# Patient Record
Sex: FEMALE | Race: OTHER | HISPANIC OR LATINO | Employment: FULL TIME | URBAN - METROPOLITAN AREA
[De-identification: names, ages, dates, MRNs, and addresses within clinical notes are randomized per-mention and may not be internally consistent; named-entity substitution may affect disease eponyms.]

---

## 2020-06-05 ENCOUNTER — OFFICE VISIT (OUTPATIENT)
Dept: OBGYN CLINIC | Facility: CLINIC | Age: 35
End: 2020-06-05

## 2020-06-05 VITALS
HEIGHT: 65 IN | SYSTOLIC BLOOD PRESSURE: 119 MMHG | TEMPERATURE: 97.2 F | DIASTOLIC BLOOD PRESSURE: 81 MMHG | BODY MASS INDEX: 31.92 KG/M2 | WEIGHT: 191.6 LBS

## 2020-06-05 DIAGNOSIS — Z01.419 ENCOUNTER FOR ANNUAL ROUTINE GYNECOLOGICAL EXAMINATION: Primary | ICD-10-CM

## 2020-06-05 PROCEDURE — 87624 HPV HI-RISK TYP POOLED RSLT: CPT | Performed by: NURSE PRACTITIONER

## 2020-06-05 PROCEDURE — 88141 CYTOPATH C/V INTERPRET: CPT | Performed by: PATHOLOGY

## 2020-06-05 PROCEDURE — 99385 PREV VISIT NEW AGE 18-39: CPT | Performed by: NURSE PRACTITIONER

## 2020-06-05 PROCEDURE — G0145 SCR C/V CYTO,THINLAYER,RESCR: HCPCS | Performed by: PATHOLOGY

## 2020-06-05 RX ORDER — FERROUS SULFATE 325(65) MG
325 TABLET ORAL
COMMUNITY

## 2020-06-10 LAB
HPV HR 12 DNA CVX QL NAA+PROBE: POSITIVE
HPV16 DNA CVX QL NAA+PROBE: NEGATIVE
HPV18 DNA CVX QL NAA+PROBE: NEGATIVE

## 2020-06-22 ENCOUNTER — TELEPHONE (OUTPATIENT)
Dept: OBGYN CLINIC | Facility: CLINIC | Age: 35
End: 2020-06-22

## 2020-06-22 PROBLEM — R87.620 ATYPICAL SQUAMOUS CELL CHANGES OF UNDETERMINED SIGNIFICANCE (ASCUS) ON VAGINAL CYTOLOGY WITH POSITIVE HIGH RISK HUMAN PAPILLOMA VIRUS (HPV): Status: ACTIVE | Noted: 2020-06-22

## 2020-06-22 PROBLEM — R87.811 ATYPICAL SQUAMOUS CELL CHANGES OF UNDETERMINED SIGNIFICANCE (ASCUS) ON VAGINAL CYTOLOGY WITH POSITIVE HIGH RISK HUMAN PAPILLOMA VIRUS (HPV): Status: ACTIVE | Noted: 2020-06-22

## 2020-06-22 LAB
LAB AP GYN PRIMARY INTERPRETATION: ABNORMAL
Lab: ABNORMAL
PATH INTERP SPEC-IMP: ABNORMAL

## 2020-06-26 ENCOUNTER — TELEPHONE (OUTPATIENT)
Dept: OBGYN CLINIC | Facility: CLINIC | Age: 35
End: 2020-06-26

## 2020-07-21 ENCOUNTER — PROCEDURE VISIT (OUTPATIENT)
Dept: OBGYN CLINIC | Facility: CLINIC | Age: 35
End: 2020-07-21

## 2020-07-21 VITALS
SYSTOLIC BLOOD PRESSURE: 114 MMHG | BODY MASS INDEX: 32.22 KG/M2 | DIASTOLIC BLOOD PRESSURE: 75 MMHG | WEIGHT: 193.4 LBS | HEIGHT: 65 IN | HEART RATE: 69 BPM | TEMPERATURE: 98.2 F

## 2020-07-21 DIAGNOSIS — R87.611 PAP SMEAR OF CERVIX WITH ASCUS, CANNOT EXCLUDE HGSIL: Primary | ICD-10-CM

## 2020-07-21 PROCEDURE — 88305 TISSUE EXAM BY PATHOLOGIST: CPT | Performed by: PATHOLOGY

## 2020-07-21 PROCEDURE — 57454 BX/CURETT OF CERVIX W/SCOPE: CPT | Performed by: OBSTETRICS & GYNECOLOGY

## 2020-07-21 NOTE — PATIENT INSTRUCTIONS
Tubal Ligation   AMBULATORY CARE:   What you need to know about a tubal ligation:  A tubal ligation is surgery to close your fallopian tubes to prevent pregnancy  How to prepare for a tubal ligation:  Your healthcare provider will talk to you about how to prepare for surgery  He may tell you not to eat or drink anything within 8 hours before your surgery  He will tell you what medicines to take or not take on the day of your surgery  Arrange for someone to drive you home and stay with you after surgery  What will happen during a tubal ligation:   · You may be given general anesthesia to keep you asleep and free from pain during surgery  You may instead be given regional anesthesia or local anesthesia  Regional anesthesia numbs the lower part of your body  Local anesthesia numbs the surgery area  With local anesthesia, you may still feel pressure or pushing during surgery, but you should not feel any pain  You may have a minilaparotomy or laparoscopic tubal ligation  During a minilaparotomy, your surgeon will make a small incision in your lower abdomen  · During laparoscopic tubal ligation, your surgeon will make one or more small incisions in your abdomen  A laparoscope and other instruments will be put into your abdomen through the small incisions  The laparoscope is a long metal tube with a light and camera on the end  Your abdomen will be filled with a gas  This allows your surgeon to see inside your abdomen more clearly  Your fallopian tubes will be cut and closed with thread  Your healthcare provider may instead seal your tubes with heat, or with a clip or ring  After the surgery is done, your incisions are closed with stitches or staples  The incisions may be covered with a bandage  What will happen after a tubal ligation:  You will have abdominal pain and cramps for the first few days after surgery  You may feel dizzy, nauseated, bloated, or have gas   You may also have pain in your shoulders or near your ribs if gas was put in your abdomen  Risks of a tubal ligation:  You may bleed more than expected or get an infection  Blood vessels or organs such as your bowel or bladder could be injured during surgery  Although pregnancy is unlikely after a tubal ligation, there is still a small chance that you may get pregnant  If pregnancy does occur, there is an increased risk for an ectopic pregnancy (tubal pregnancy)  You may get a blood clot in your leg or arm  This may become life-threatening  Call 911 for any of the following:   · You cough up blood  · You feel lightheaded, short of breath, and have chest pain  Seek care immediately if:   · Your arm or leg feels warm, tender, and painful  It may look swollen and red  · You have severe abdominal pain  Contact your surgeon or gynecologist if:   · You have a fever  · You have heavy bleeding from your incisions  · Your stitches or staples come apart  · You have trouble urinating, burning when you urinate, or bloody urine  · Your incision is swollen, red, or has pus coming from it  · You have questions or concerns about your condition or care  Medicines: You may need any of the following:  · Prescription pain medicine  may be given  Ask how to take this medicine safely  · Acetaminophen  decreases pain  It is available without a doctor's order  Ask how much to take and how often to take it  Follow directions  Acetaminophen can cause liver damage if not taken correctly  · NSAIDs , such as ibuprofen, help decrease swelling, pain, and fever  NSAIDs can cause stomach bleeding or kidney problems in certain people  If you take blood thinner medicine, always ask your healthcare provider if NSAIDs are safe for you  Always read the medicine label and follow directions  · Take your medicine as directed  Contact your healthcare provider if you think your medicine is not helping or if you have side effects   Tell him or her if you are allergic to any medicine  Keep a list of the medicines, vitamins, and herbs you take  Include the amounts, and when and why you take them  Bring the list or the pill bottles to follow-up visits  Carry your medicine list with you in case of an emergency  Activity:   · Avoid strenuous activities  such as lifting heavy objects and intense exercise for 7 days after your surgery  Ask when it is safe for you to drive, return to work, and return to other regular activities  · Do not strain during bowel movements  High-fiber foods and extra liquids can help you prevent constipation  Examples of high-fiber foods are fruit and bran  Prune juice and water are good liquids to drink  · Do not have sex  for at least 1 week  If your tubal ligation surgery was done after you had a baby, you will need to wait longer  Ask your healthcare provider when you can have sex  · Do not go into a bath or hot tub  for 10 days or as directed  Take a shower instead of taking a bath  Wound care:  Care for your wound as directed  Carefully wash the wound with soap and water  Dry the area and put on new, clean bandages as directed  Change your bandages when they get wet or dirty  Follow up with your healthcare provider within 7 to 10 days or as directed:  Write down your questions so you remember to ask them during your visits  © 2017 [employer] Information is for End User's use only and may not be sold, redistributed or otherwise used for commercial purposes  All illustrations and images included in CareNotes® are the copyrighted property of A D A M [employer] Inc  or Naresh Patrick  The above information is an  only  It is not intended as medical advice for individual conditions or treatments  Talk to your doctor, nurse or pharmacist before following any medical regimen to see if it is safe and effective for you    Colposcopy   WHAT YOU NEED TO KNOW:   A colposcopy is a procedure to look for abnormal cells in your cervix and vagina  Your healthcare provider will use a colposcope, which is a small scope with a light on it  DISCHARGE INSTRUCTIONS:   Medicines:   · Pain medicine: You may be given medicine to take away or decrease pain  Do not wait until the pain is severe before you take your medicine  · Take your medicine as directed  Call your healthcare provider if you think your medicine is not helping or if you have side effects  Tell him if you are allergic to any medicine  Keep a list of the medicines, vitamins, and herbs you take  Include the amounts, and when and why you take them  Bring the list or the pill bottles to follow-up visits  Carry your medicine list with you in case of an emergency  Follow up with your healthcare provider or gynecologist as directed: You may need to return for more tests or to have abnormal cells removed  Write down your questions so you remember to ask them during your visits  Self-care:  Use a sanitary pad for any light bleeding  Ask when it is okay to use tampons, douche, or have sex  If you are pregnant, do not put anything in your vagina until your healthcare provider says it is okay  Avoid heavy lifting for 24 hours after your procedure, this will decrease your risk of bleeding  Contact your healthcare provider or gynecologist if:   · You have pain that does not go away, even after you take pain medicine  · You have a fever  · You have questions or concerns about your condition or care  Seek care immediately or call 911 if:   · You have bleeding from your vagina that is heavier than your monthly period  © 2016 [employer] is for End User's use only and may not be sold, redistributed or otherwise used for commercial purposes  All illustrations and images included in CareNotes® are the copyrighted property of [employer]  or Naresh Patrick  The above information is an  only   It is not intended as medical advice for individual conditions or treatments  Talk to your doctor, nurse or pharmacist before following any medical regimen to see if it is safe and effective for you

## 2020-07-21 NOTE — PROGRESS NOTES
Colposcopy  Date/Time: 7/21/2020 3:43 PM  Performed by: Cam Aguilar MD  Authorized by: Cam Aguilar MD     Consent:     Consent obtained:  Written and verbal    Consent given by:  Patient    Procedural risks discussed:  Bleeding and infection    Patient questions answered: yes      Patient agrees, verbalizes understanding, and wants to proceed: yes      Educational handouts given: yes    Pre-procedure:     Pre-procedure timeout performed: yes      Prepped with: acetic acid and Lugol    Indication:     Indication:  ASC-H  Procedure:     Procedure: Colposcopy w/ cervical biopsy and ECC      Under satisfactory analgesia the patient was prepped and draped in the dorsal lithotomy position: yes      Madison speculum was placed in the vagina: yes      Under colposcopic examination the transition zone was seen in entirety: yes      Endocervix was curetted using a Kevorkian curette: yes      Cervical biopsy performed with a cervical biopsy punch: yes      Monsel's solution was applied: yes      Biopsy(s): yes      Location:  6 o'clock    Specimen to pathology: yes    Post-procedure:     Findings: White epithelium      Impression: Low grade cervical dysplasia      Patient tolerance of procedure:   Tolerated well, no immediate complications

## 2020-07-31 ENCOUNTER — TELEPHONE (OUTPATIENT)
Dept: OBGYN CLINIC | Facility: CLINIC | Age: 35
End: 2020-07-31

## 2020-08-13 NOTE — PROGRESS NOTES
Assessment/Plan:     No problem-specific Assessment & Plan notes found for this encounter  Diagnoses and all orders for this visit:    Encounter to discuss test results    HGSIL on cytologic smear of cervix    RTO for surgical H&P  Pt consented for LEEP, bilateral laparoscopic salpingectomy, and IUD removal            Subjective:      Patient ID: Betzy Yun is a 37 y o  female presents for colposcopy results  The results are below  Final Diagnosis    A  Uterine Cervix, Endocervical Curetting:  - Minute fragments of benign endocervical mucosa      B  Uterine Cervix, Biopsy:  - High-grade squamous intraepithelial lesion (HSIL/MARIANO II)  LEEP of the cervix was recommended, pt agrees  Procedure was described to the patient and she verbalized understanding  Pt has a Paragard IUD in place and would like permanent sterilization  Reviewed with patient surgical sterilization is PERMANENT STERILIZATION and NOT REVERSIBLE  Discussed other temporary contraceptive options (including LARC's)  Reviewed risks & benefits of surgical sterilization  Patient verbally rejects other options and desires surgical sterilization  Sterilization consent (MA 31 form) signed   Details of the procedure were described and the patient verbalized understanding  She understands her IUD can be removed at that time  HPI    The following portions of the patient's history were reviewed and updated as appropriate: allergies, current medications, past family history, past medical history, past social history, past surgical history and problem list     Review of Systems      Objective:      /76   Pulse 83   Temp 98 4 °F (36 9 °C)   Ht 5' 5" (1 651 m)   Wt 86 9 kg (191 lb 9 6 oz)   LMP 07/21/2020 (Approximate)   BMI 31 88 kg/m²          Physical Exam  Vitals signs and nursing note reviewed  Constitutional:       Appearance: Normal appearance     Pulmonary:      Effort: Pulmonary effort is normal  Neurological:      General: No focal deficit present  Mental Status: She is oriented to person, place, and time     Psychiatric:         Mood and Affect: Mood normal          Behavior: Behavior normal

## 2020-08-14 ENCOUNTER — OFFICE VISIT (OUTPATIENT)
Dept: OBGYN CLINIC | Facility: CLINIC | Age: 35
End: 2020-08-14

## 2020-08-14 VITALS
HEART RATE: 83 BPM | HEIGHT: 65 IN | TEMPERATURE: 98.4 F | DIASTOLIC BLOOD PRESSURE: 76 MMHG | BODY MASS INDEX: 31.92 KG/M2 | SYSTOLIC BLOOD PRESSURE: 118 MMHG | WEIGHT: 191.6 LBS

## 2020-08-14 DIAGNOSIS — R87.613 HGSIL ON CYTOLOGIC SMEAR OF CERVIX: ICD-10-CM

## 2020-08-14 DIAGNOSIS — Z71.2 ENCOUNTER TO DISCUSS TEST RESULTS: Primary | ICD-10-CM

## 2020-08-14 PROCEDURE — 99214 OFFICE O/P EST MOD 30 MIN: CPT | Performed by: OBSTETRICS & GYNECOLOGY

## 2020-08-14 NOTE — PATIENT INSTRUCTIONS
Loop Electrosurgical Excision Procedure   AMBULATORY CARE:   A loop electrosurgical excision procedure (LEEP)  A LEEP is a procedure to remove abnormal tissue from your cervix or vagina  The tissue can be tested for cancer or infection  You may need a LEEP if other tests have found abnormal cells on your cervix or in your vagina  How to prepare for a LEEP:  Your healthcare provider will talk to you about how to prepare for your procedure  Do not douche, use tampons, or have sex for 24 hours before the procedure  Do not put medicines in your vagina for 24 hours before the procedure  Call your healthcare provider if you have your menstrual period on the day of the procedure  You may need to wait until your period ends to have the procedure  Arrange for someone to drive you home and stay with you after your procedure  What will happen during a LEEP:   · Your healthcare provider will insert a speculum in your vagina  A speculum is an instrument that holds the vagina open so your provider can see your cervix  You will be given local anesthesia to numb your cervix  With local anesthesia, you may feel pressure or pushing during your procedure, but you should not feel pain  · Your healthcare provider will insert a tube with a looped wire at the end into your vagina  He or she will use this instrument to remove a sample of tissue from your cervix  You may feel pain or cramping when the sample is taken  You may also feel dizzy  Tell your healthcare provider if the dizziness gets worse  Your healthcare provider may use a paste or tools to control bleeding  What will happen after a LEEP:  Your healthcare provider will monitor you for heavy bleeding  You may have cramping, bleeding, or dark brown discharge after your procedure  These symptoms may last up to 4 weeks  Risks of a LEEP:  You may bleed more than expected or get an infection  Your menstrual periods may feel more painful after the procedure   You may have problems getting pregnant or be at risk for a miscarriage or  birth  If you do get pregnant, your baby may be underweight  Seek care immediately if:   · You have severe pain in your lower abdomen  · You soak through 1 sanitary pad in 1 hour or less  · You feel weak, dizzy, or faint  Contact your healthcare provider if:   · You have a fever, chills, or foul-smelling discharge  · You have bleeding with clots  · Your bleeding is heavier than your menstrual period  · Your pain gets worse or does not get better after you take pain medicine  · You have questions or concerns about your condition or care  Medicines:   · NSAIDs , such as ibuprofen, help decrease swelling, pain, and fever  NSAIDs can cause stomach bleeding or kidney problems in certain people  If you take blood thinner medicine, always ask your healthcare provider if NSAIDs are safe for you  Always read the medicine label and follow directions  · Take your medicine as directed  Contact your healthcare provider if you think your medicine is not helping or if you have side effects  Tell him or her if you are allergic to any medicine  Keep a list of the medicines, vitamins, and herbs you take  Include the amounts, and when and why you take them  Bring the list or the pill bottles to follow-up visits  Carry your medicine list with you in case of an emergency  Activity:  Rest for 48 hours or as directed  Do not exercise, play sports, or lift anything heavier than 5 pounds  Do not go swimming or get in a hot tub  Ask your healthcare provider when you can return to your usual activities  Bathing:  Shower only after your procedure  Do not take a bath  Baths may increase your risk for an infection  Ask your healthcare provider how long to follow these instructions  Do not put anything in your vagina for 2 weeks:  Do not douche, use medicines in your vagina, or have sex  Do not use tampons   Instead, wear a sanitary pad for bleeding  Get pap smears as directed:  A pap smear can help diagnose cervical cancer early  Cervical cancer that is diagnosed early is easier to treat  Do not smoke:  Nicotine and other chemicals in cigarettes and cigars can increase your risk for cervical cancer  Ask your healthcare provider for more information if you currently smoke and need help to quit  E-cigarettes or smokeless tobacco still contain nicotine  Talk to your healthcare provider before you use these products  Practice safe sex:  Safe sex can help decrease your risk for sexually transmitted infections (STIs)  STIs can cause cervical cancer  Limit your number of sex partners  Use condoms and barrier methods for all types of sexual contact  Use a new condom or latex barrier each time you have sex  This includes oral, vaginal, and anal sex  Make sure that the condom fits and is put on correctly  Follow up with your healthcare provider as directed:  Write down your questions so you remember to ask them during your visits  © 2017 [address] [employer] Information is for End User's use only and may not be sold, redistributed or otherwise used for commercial purposes  All illustrations and images included in CareNotes® are the copyrighted property of [employer] , [employer]  or Naresh Patrick  The above information is an  only  It is not intended as medical advice for individual conditions or treatments  Talk to your doctor, nurse or pharmacist before following any medical regimen to see if it is safe and effective for you

## 2020-08-20 ENCOUNTER — OFFICE VISIT (OUTPATIENT)
Dept: OBGYN CLINIC | Facility: CLINIC | Age: 35
End: 2020-08-20

## 2020-08-20 VITALS
HEART RATE: 98 BPM | WEIGHT: 193 LBS | BODY MASS INDEX: 32.12 KG/M2 | DIASTOLIC BLOOD PRESSURE: 80 MMHG | TEMPERATURE: 97 F | SYSTOLIC BLOOD PRESSURE: 137 MMHG

## 2020-08-20 DIAGNOSIS — Z30.432 ENCOUNTER FOR IUD REMOVAL: Chronic | ICD-10-CM

## 2020-08-20 DIAGNOSIS — Z30.2 ENCOUNTER FOR STERILIZATION: Chronic | ICD-10-CM

## 2020-08-20 DIAGNOSIS — R87.613 HIGH GRADE SQUAMOUS INTRAEPITHELIAL LESION OF CERVIX: Primary | Chronic | ICD-10-CM

## 2020-08-20 PROCEDURE — 99213 OFFICE O/P EST LOW 20 MIN: CPT | Performed by: OBSTETRICS & GYNECOLOGY

## 2020-08-20 NOTE — PROGRESS NOTES
H&P Exam - Gynecology   Vicente Nelson 37 y o  female MRN: [id number]  Unit/Bed#:  Encounter: [id number]      History of Present Illness     HPI:  Vicente Nelson is a 37 y o  female who presents with high-grade squamous intraepithelial lesion plan for LEEP procedure and patient desire permanent sterilization  Patient Pap smear in resulted as ASCUS with HPV high risk negative and colposcopy exam compatible with high grade squamous intraepithelial lesion     We have been discussed other form of birth control and partner vasectomy which is at office procedure and she endorse she is firm her decision about permanent sterilization  Review of Systems   Constitutional: Negative  HENT: Negative  Respiratory: Negative  Cardiovascular: Negative  Gastrointestinal: Negative  Genitourinary: Negative  Neurological: Negative  Psychiatric/Behavioral: Negative  Historical Information   Past Medical History:   Diagnosis Date    Abnormal Pap smear of cervix     Anemia      Past Surgical History:   Procedure Laterality Date    APPENDECTOMY      BARIATRIC SURGERY       OB/GYN History:   Family History   Problem Relation Age of Onset    Hypertension Mother     Hypertension Father      Social History   Social History     Substance and Sexual Activity   Alcohol Use Yes    Comment: socially      Social History     Substance and Sexual Activity   Drug Use Never     Social History     Tobacco Use   Smoking Status Never Smoker   Smokeless Tobacco Never Used       Meds/Allergies   (Not in a hospital admission)    No Known Allergies    Objective   /80   Pulse 98   Temp (!) 97 °F (36 1 °C)   Wt 87 5 kg (193 lb)   LMP 2020 (Approximate)   BMI 32 12 kg/m²     [unfilled]    Physical Exam  Constitutional:       Appearance: Normal appearance  Cardiovascular:      Rate and Rhythm: Normal rate and regular rhythm  Pulses: Normal pulses  Heart sounds: Normal heart sounds  Pulmonary:      Effort: Pulmonary effort is normal  No respiratory distress  Breath sounds: Normal breath sounds  No wheezing  Abdominal:      Palpations: Abdomen is soft  Genitourinary:     General: Normal vulva  Vagina: No vaginal discharge  Rectum: Normal       Comments: Speculum exam cervix multipara in appearance, no mass and lesion appreciated  Uterus midposition normal in size  Bilateral adnexa nonpalpable  IUD strings observed  Musculoskeletal: Normal range of motion  Skin:     General: Skin is warm  Neurological:      General: No focal deficit present  Mental Status: She is alert and oriented to person, place, and time  Psychiatric:         Mood and Affect: Mood normal          Lab Results:   No visits with results within 1 Day(s) from this visit  Latest known visit with results is:   Procedure visit on 07/21/2020   Component Date Value    Case Report 07/21/2020                      Value:Surgical Pathology Report                         Case: [id number]                                   Authorizing Provider:  Marisa Carrillo MD     Collected:           07/21/2020 1543              Ordering Location:     [facility]       Received:            07/21/2020 [facility]                                                                 Pathologist:           Katarina Mcgrath DO                                                     Specimens:   A) - Endocervical                                                                                   B) - Cervix                                                                                Final Diagnosis 07/21/2020                      Value: This result contains rich text formatting which cannot be displayed here   Additional Information 07/21/2020                      Value: This result contains rich text formatting which cannot be displayed here     Alyssa Rueda Description 07/21/2020                      Value: This result contains rich text formatting which cannot be displayed here  Imaging: I have personally reviewed pertinent reports  EKG, Pathology, and Other Studies: I have personally reviewed pertinent reports  Assessment/Plan     A/P: Laparoscopic salpingectomy, LEEP and IUD removal    Encounter for sterilization  - Patient understands that BTL is intended for permanent sterilization and is not intended to be a reversible form of birth control  Patient understands that though this procedure is intended to provide permanent sterilization, there is still a chance for failure of the procedure and that she may become pregnant in the future despite BTL  She understands that with BTL there exists an increased risk of ectopic pregnancy were she to become pregnant postprocedure, and that this is considered an abnormal pregnancy and would likely result in miscarriage or termination, and that ectopic pregnancy poses a risk to her maternal health and well-being including a risk of death  Patient understands that there are alternative forms of birth control including abstinence, condoms or other barrier methods, OCPs, depo provera injection, ring, patch, Nexplanon, IUD - all of which are reversible and would allow for attempt of future pregnancy  She has declined these methods of birth control  Patient also declines vasectomy of partner as a method of birth control       - Discussed risks of surgical procedures, including risk of bleeding, blood clots, infection, injury to soft tissue or surrounding organs, permanent disability, or even death  Discussed procedure in detail  Discussed the various methods of surgical sterilization including tubal ligation and salpingectomy, and that route of procedure will be dependent on the physician covering the procedure  Pt demonstrated understanding and expressed a desire for salpingectomy as the preferred method   Pt had opportunity to ask questions and all questions were answered to patient's satisfaction  Hibiclens preop soap given to patient  Consent form signed  - 30 minutes spent with patient, more than 50% visit spent counseling on contraception methods  Also counseled on abstinence, safe sex practices, STI screening needs, condom use and high risk behaviours       High grade squamous intraepithelial lesion of cervix  - ASCUS PAP smear with HPV other positive  - Colposcopy high grade intraepithelial neoplasia  - Plan LEEP procedure     Encounter for IUD removal  - IUD will remove during surgery      Code Status: Full Code    Roselyn Farah MD  6/39/5562  26:62 PM

## 2020-08-20 NOTE — ASSESSMENT & PLAN NOTE
- Patient understands that BTL is intended for permanent sterilization and is not intended to be a reversible form of birth control  Patient understands that though this procedure is intended to provide permanent sterilization, there is still a chance for failure of the procedure and that she may become pregnant in the future despite BTL  She understands that with BTL there exists an increased risk of ectopic pregnancy were she to become pregnant postprocedure, and that this is considered an abnormal pregnancy and would likely result in miscarriage or termination, and that ectopic pregnancy poses a risk to her maternal health and well-being including a risk of death  Patient understands that there are alternative forms of birth control including abstinence, condoms or other barrier methods, OCPs, depo provera injection, ring, patch, Nexplanon, IUD - all of which are reversible and would allow for attempt of future pregnancy  She has declined these methods of birth control  Patient also declines vasectomy of partner as a method of birth control       - Discussed risks of surgical procedures, including risk of bleeding, blood clots, infection, injury to soft tissue or surrounding organs, permanent disability, or even death  Discussed procedure in detail  Discussed the various methods of surgical sterilization including tubal ligation and salpingectomy, and that route of procedure will be dependent on the physician covering the procedure  Pt demonstrated understanding and expressed a desire for salpingectomy as the preferred method  Pt had opportunity to ask questions and all questions were answered to patient's satisfaction  Hibiclens preop soap given to patient  Consent form signed  - 30 minutes spent with patient, more than 50% visit spent counseling on contraception methods  Also counseled on abstinence, safe sex practices, STI screening needs, condom use and high risk behaviours

## 2020-08-21 NOTE — ASSESSMENT & PLAN NOTE
- ASCUS PAP smear with HPV other positive  - Colposcopy high grade intraepithelial neoplasia  - Plan LEEP procedure

## 2020-08-21 NOTE — ASSESSMENT & PLAN NOTE
- IUD will remove during surgery Patient called and given th result of renal ultrasound and labs. Marina Vitale LPN Staff Nurse

## 2020-08-25 ENCOUNTER — TELEPHONE (OUTPATIENT)
Dept: OBGYN CLINIC | Facility: CLINIC | Age: 35
End: 2020-08-25

## 2020-08-27 ENCOUNTER — TELEPHONE (OUTPATIENT)
Dept: OBGYN CLINIC | Facility: CLINIC | Age: 35
End: 2020-08-27

## 2020-08-27 ENCOUNTER — PREP FOR PROCEDURE (OUTPATIENT)
Dept: OBGYN CLINIC | Facility: CLINIC | Age: 35
End: 2020-08-27

## 2020-08-27 DIAGNOSIS — Z01.818 PRE-OP TESTING: ICD-10-CM

## 2020-08-27 DIAGNOSIS — Z30.432 ENCOUNTER FOR REMOVAL OF INTRAUTERINE CONTRACEPTIVE DEVICE: ICD-10-CM

## 2020-08-27 DIAGNOSIS — Z64.1 MULTIPARITY: ICD-10-CM

## 2020-08-27 DIAGNOSIS — Z01.411 ENCOUNTER FOR WELL WOMAN EXAM WITH ABNORMAL FINDINGS: Primary | ICD-10-CM

## 2020-08-27 NOTE — TELEPHONE ENCOUNTER
Patient was informed of her surgery date on 9/11/20 at [facility]  H&P completed  Will mail lab slip  Patient will  Surgery booklet and soap at the [facility]

## 2020-08-28 ENCOUNTER — TELEPHONE (OUTPATIENT)
Dept: OBGYN CLINIC | Facility: CLINIC | Age: 35
End: 2020-08-28

## 2020-09-02 ENCOUNTER — APPOINTMENT (OUTPATIENT)
Dept: LAB | Facility: HOSPITAL | Age: 35
End: 2020-09-02
Payer: COMMERCIAL

## 2020-09-02 DIAGNOSIS — Z30.432 ENCOUNTER FOR REMOVAL OF INTRAUTERINE CONTRACEPTIVE DEVICE: ICD-10-CM

## 2020-09-02 DIAGNOSIS — Z01.818 PRE-OP TESTING: ICD-10-CM

## 2020-09-02 DIAGNOSIS — Z01.411 ENCOUNTER FOR WELL WOMAN EXAM WITH ABNORMAL FINDINGS: ICD-10-CM

## 2020-09-02 DIAGNOSIS — Z64.1 MULTIPARITY: ICD-10-CM

## 2020-09-02 LAB
ANISOCYTOSIS BLD QL SMEAR: PRESENT
BASOPHILS # BLD AUTO: 0 THOUSANDS/ΜL (ref 0–0.1)
BASOPHILS NFR BLD AUTO: 1 % (ref 0–1)
EOSINOPHIL # BLD AUTO: 0 THOUSAND/ΜL (ref 0–0.4)
EOSINOPHIL NFR BLD AUTO: 1 % (ref 0–6)
ERYTHROCYTE [DISTWIDTH] IN BLOOD BY AUTOMATED COUNT: 17.2 %
HCT VFR BLD AUTO: 31.2 % (ref 36–46)
HGB BLD-MCNC: 10 G/DL (ref 12–16)
HYPERCHROMIA BLD QL SMEAR: PRESENT
LYMPHOCYTES # BLD AUTO: 1.4 THOUSANDS/ΜL (ref 0.5–4)
LYMPHOCYTES NFR BLD AUTO: 17 % (ref 25–45)
MCH RBC QN AUTO: 23.7 PG (ref 26–34)
MCHC RBC AUTO-ENTMCNC: 32 G/DL (ref 31–36)
MCV RBC AUTO: 74 FL (ref 80–100)
MICROCYTES BLD QL AUTO: PRESENT
MONOCYTES # BLD AUTO: 0.4 THOUSAND/ΜL (ref 0.2–0.9)
MONOCYTES NFR BLD AUTO: 5 % (ref 1–10)
NEUTROPHILS # BLD AUTO: 6.6 THOUSANDS/ΜL (ref 1.8–7.8)
NEUTS SEG NFR BLD AUTO: 77 % (ref 45–65)
PLATELET # BLD AUTO: 249 THOUSANDS/UL (ref 150–450)
PLATELET BLD QL SMEAR: ADEQUATE
PMV BLD AUTO: 9.6 FL (ref 8.9–12.7)
RBC # BLD AUTO: 4.2 MILLION/UL (ref 4–5.2)
RBC MORPH BLD: NORMAL
WBC # BLD AUTO: 8.5 THOUSAND/UL (ref 4.5–11)

## 2020-09-02 PROCEDURE — 36415 COLL VENOUS BLD VENIPUNCTURE: CPT

## 2020-09-02 PROCEDURE — 85025 COMPLETE CBC W/AUTO DIFF WBC: CPT

## 2020-09-02 RX ORDER — MULTIVITAMIN
1 CAPSULE ORAL DAILY
COMMUNITY

## 2020-09-14 ENCOUNTER — ANESTHESIA EVENT (OUTPATIENT)
Dept: PERIOP | Facility: HOSPITAL | Age: 35
End: 2020-09-14
Payer: COMMERCIAL

## 2020-09-14 NOTE — ANESTHESIA PREPROCEDURE EVALUATION
Procedure:  SALPINGECTOMY, LAPAROSCOPIC (Bilateral Abdomen)  BIOPSY LEEP CERVIX (N/A Cervix)  REMOVAL OF INTRAUTERINE DEVICE (IUD) (N/A Uterus)    Denies the following: CP/SOB with exertion, COPD, asthma, BONILLA, GERD, stroke/TIA, seizure    Relevant Problems   ANESTHESIA   (+) PONV (postoperative nausea and vomiting)      Other   (+) Atypical squamous cell changes of undetermined significance (ASCUS) on vaginal cytology with positive high risk human papilloma virus (HPV)   (+) Encounter for IUD removal   (+) Encounter for sterilization        Physical Exam    Airway    Mallampati score: III  TM Distance: >3 FB  Neck ROM: full     Dental   No notable dental hx     Cardiovascular  Rhythm: regular, Rate: normal,     Pulmonary  Breath sounds clear to auscultation,     Other Findings        Anesthesia Plan  ASA Score- 2     Anesthesia Type- general with ASA Monitors  Additional Monitors:   Airway Plan: ETT  Plan Factors-Exercise tolerance (METS): >4 METS  Chart reviewed  EKG reviewed  Existing labs reviewed  Patient summary reviewed  Patient is not a current smoker  Obstructive sleep apnea risk education given perioperatively  Induction- intravenous  Postoperative Plan- Plan for postoperative opioid use  Informed Consent- Anesthetic plan and risks discussed with patient  I personally reviewed this patient with the CRNA  Discussed and agreed on the Anesthesia Plan with the CRNA  Ines Arcos

## 2020-09-15 ENCOUNTER — ANESTHESIA (OUTPATIENT)
Dept: PERIOP | Facility: HOSPITAL | Age: 35
End: 2020-09-15
Payer: COMMERCIAL

## 2020-09-15 ENCOUNTER — HOSPITAL ENCOUNTER (OUTPATIENT)
Facility: HOSPITAL | Age: 35
Setting detail: OUTPATIENT SURGERY
Discharge: HOME | End: 2020-09-15
Attending: OBSTETRICS & GYNECOLOGY | Admitting: OBSTETRICS & GYNECOLOGY
Payer: COMMERCIAL

## 2020-09-15 VITALS
HEART RATE: 54 BPM | TEMPERATURE: 98.8 F | SYSTOLIC BLOOD PRESSURE: 106 MMHG | WEIGHT: 193 LBS | HEIGHT: 65 IN | OXYGEN SATURATION: 99 % | RESPIRATION RATE: 16 BRPM | BODY MASS INDEX: 32.15 KG/M2 | DIASTOLIC BLOOD PRESSURE: 70 MMHG

## 2020-09-15 VITALS — HEART RATE: 93 BPM

## 2020-09-15 DIAGNOSIS — Z64.1 MULTIPARITY: ICD-10-CM

## 2020-09-15 DIAGNOSIS — Z30.432 ENCOUNTER FOR REMOVAL OF INTRAUTERINE CONTRACEPTIVE DEVICE: ICD-10-CM

## 2020-09-15 DIAGNOSIS — Z98.890 S/P LAPAROSCOPY: Primary | ICD-10-CM

## 2020-09-15 DIAGNOSIS — Z01.411 ENCOUNTER FOR WELL WOMAN EXAM WITH ABNORMAL FINDINGS: ICD-10-CM

## 2020-09-15 PROBLEM — R11.2 PONV (POSTOPERATIVE NAUSEA AND VOMITING): Status: ACTIVE | Noted: 2020-09-15

## 2020-09-15 LAB
EXT PREGNANCY TEST URINE: NEGATIVE
EXT. CONTROL: NORMAL

## 2020-09-15 PROCEDURE — 88302 TISSUE EXAM BY PATHOLOGIST: CPT | Performed by: PATHOLOGY

## 2020-09-15 PROCEDURE — 58661 LAPAROSCOPY REMOVE ADNEXA: CPT | Performed by: OBSTETRICS & GYNECOLOGY

## 2020-09-15 PROCEDURE — 88344 IMHCHEM/IMCYTCHM EA MLT ANTB: CPT | Performed by: PATHOLOGY

## 2020-09-15 PROCEDURE — 57522 CONIZATION OF CERVIX: CPT | Performed by: OBSTETRICS & GYNECOLOGY

## 2020-09-15 PROCEDURE — 81025 URINE PREGNANCY TEST: CPT | Performed by: OBSTETRICS & GYNECOLOGY

## 2020-09-15 PROCEDURE — 88307 TISSUE EXAM BY PATHOLOGIST: CPT | Performed by: PATHOLOGY

## 2020-09-15 PROCEDURE — 58301 REMOVE INTRAUTERINE DEVICE: CPT | Performed by: OBSTETRICS & GYNECOLOGY

## 2020-09-15 RX ORDER — PROPOFOL 10 MG/ML
INJECTION, EMULSION INTRAVENOUS CONTINUOUS PRN
Status: DISCONTINUED | OUTPATIENT
Start: 2020-09-15 | End: 2020-09-15

## 2020-09-15 RX ORDER — IBUPROFEN 600 MG/1
600 TABLET ORAL EVERY 6 HOURS PRN
Status: DISCONTINUED | OUTPATIENT
Start: 2020-09-15 | End: 2020-09-15 | Stop reason: HOSPADM

## 2020-09-15 RX ORDER — HYDROMORPHONE HCL/PF 1 MG/ML
0.5 SYRINGE (ML) INJECTION
Status: DISCONTINUED | OUTPATIENT
Start: 2020-09-15 | End: 2020-09-15 | Stop reason: HOSPADM

## 2020-09-15 RX ORDER — NEOSTIGMINE METHYLSULFATE 1 MG/ML
INJECTION INTRAVENOUS AS NEEDED
Status: DISCONTINUED | OUTPATIENT
Start: 2020-09-15 | End: 2020-09-15

## 2020-09-15 RX ORDER — EPHEDRINE SULFATE 50 MG/ML
INJECTION INTRAVENOUS AS NEEDED
Status: DISCONTINUED | OUTPATIENT
Start: 2020-09-15 | End: 2020-09-15

## 2020-09-15 RX ORDER — DEXAMETHASONE SODIUM PHOSPHATE 10 MG/ML
INJECTION, SOLUTION INTRAMUSCULAR; INTRAVENOUS AS NEEDED
Status: DISCONTINUED | OUTPATIENT
Start: 2020-09-15 | End: 2020-09-15

## 2020-09-15 RX ORDER — PROPOFOL 10 MG/ML
INJECTION, EMULSION INTRAVENOUS AS NEEDED
Status: DISCONTINUED | OUTPATIENT
Start: 2020-09-15 | End: 2020-09-15

## 2020-09-15 RX ORDER — FENTANYL CITRATE 50 UG/ML
INJECTION, SOLUTION INTRAMUSCULAR; INTRAVENOUS AS NEEDED
Status: DISCONTINUED | OUTPATIENT
Start: 2020-09-15 | End: 2020-09-15

## 2020-09-15 RX ORDER — ACETIC ACID 5 %
LIQUID (ML) MISCELLANEOUS AS NEEDED
Status: DISCONTINUED | OUTPATIENT
Start: 2020-09-15 | End: 2020-09-15 | Stop reason: HOSPADM

## 2020-09-15 RX ORDER — MIDAZOLAM HYDROCHLORIDE 2 MG/2ML
INJECTION, SOLUTION INTRAMUSCULAR; INTRAVENOUS AS NEEDED
Status: DISCONTINUED | OUTPATIENT
Start: 2020-09-15 | End: 2020-09-15

## 2020-09-15 RX ORDER — KETOROLAC TROMETHAMINE 30 MG/ML
INJECTION, SOLUTION INTRAMUSCULAR; INTRAVENOUS AS NEEDED
Status: DISCONTINUED | OUTPATIENT
Start: 2020-09-15 | End: 2020-09-15

## 2020-09-15 RX ORDER — ONDANSETRON 2 MG/ML
INJECTION INTRAMUSCULAR; INTRAVENOUS AS NEEDED
Status: DISCONTINUED | OUTPATIENT
Start: 2020-09-15 | End: 2020-09-15

## 2020-09-15 RX ORDER — SODIUM CHLORIDE, SODIUM LACTATE, POTASSIUM CHLORIDE, CALCIUM CHLORIDE 600; 310; 30; 20 MG/100ML; MG/100ML; MG/100ML; MG/100ML
125 INJECTION, SOLUTION INTRAVENOUS CONTINUOUS
Status: DISCONTINUED | OUTPATIENT
Start: 2020-09-15 | End: 2020-09-15

## 2020-09-15 RX ORDER — SCOLOPAMINE TRANSDERMAL SYSTEM 1 MG/1
1 PATCH, EXTENDED RELEASE TRANSDERMAL
Status: DISCONTINUED | OUTPATIENT
Start: 2020-09-15 | End: 2020-09-15

## 2020-09-15 RX ORDER — ROCURONIUM BROMIDE 10 MG/ML
INJECTION, SOLUTION INTRAVENOUS AS NEEDED
Status: DISCONTINUED | OUTPATIENT
Start: 2020-09-15 | End: 2020-09-15

## 2020-09-15 RX ORDER — BUPIVACAINE HYDROCHLORIDE 2.5 MG/ML
INJECTION, SOLUTION EPIDURAL; INFILTRATION; INTRACAUDAL AS NEEDED
Status: DISCONTINUED | OUTPATIENT
Start: 2020-09-15 | End: 2020-09-15 | Stop reason: HOSPADM

## 2020-09-15 RX ORDER — LIDOCAINE HYDROCHLORIDE 10 MG/ML
0.5 INJECTION, SOLUTION EPIDURAL; INFILTRATION; INTRACAUDAL; PERINEURAL ONCE AS NEEDED
Status: COMPLETED | OUTPATIENT
Start: 2020-09-15 | End: 2020-09-15

## 2020-09-15 RX ORDER — ACETAMINOPHEN 325 MG/1
650 TABLET ORAL EVERY 6 HOURS PRN
Status: DISCONTINUED | OUTPATIENT
Start: 2020-09-15 | End: 2020-09-15 | Stop reason: HOSPADM

## 2020-09-15 RX ORDER — LIDOCAINE HYDROCHLORIDE 10 MG/ML
INJECTION, SOLUTION EPIDURAL; INFILTRATION; INTRACAUDAL; PERINEURAL AS NEEDED
Status: DISCONTINUED | OUTPATIENT
Start: 2020-09-15 | End: 2020-09-15

## 2020-09-15 RX ORDER — GLYCOPYRROLATE 0.2 MG/ML
INJECTION INTRAMUSCULAR; INTRAVENOUS AS NEEDED
Status: DISCONTINUED | OUTPATIENT
Start: 2020-09-15 | End: 2020-09-15

## 2020-09-15 RX ORDER — OXYCODONE HYDROCHLORIDE AND ACETAMINOPHEN 5; 325 MG/1; MG/1
1 TABLET ORAL EVERY 4 HOURS PRN
Qty: 6 TABLET | Refills: 0 | Status: SHIPPED | OUTPATIENT
Start: 2020-09-15 | End: 2020-09-25

## 2020-09-15 RX ORDER — ONDANSETRON 2 MG/ML
4 INJECTION INTRAMUSCULAR; INTRAVENOUS ONCE AS NEEDED
Status: DISCONTINUED | OUTPATIENT
Start: 2020-09-15 | End: 2020-09-15 | Stop reason: HOSPADM

## 2020-09-15 RX ORDER — ONDANSETRON 2 MG/ML
4 INJECTION INTRAMUSCULAR; INTRAVENOUS EVERY 6 HOURS PRN
Status: DISCONTINUED | OUTPATIENT
Start: 2020-09-15 | End: 2020-09-15 | Stop reason: HOSPADM

## 2020-09-15 RX ORDER — OXYCODONE HYDROCHLORIDE 5 MG/1
5 TABLET ORAL EVERY 4 HOURS PRN
Status: DISCONTINUED | OUTPATIENT
Start: 2020-09-15 | End: 2020-09-15 | Stop reason: HOSPADM

## 2020-09-15 RX ADMIN — NEOSTIGMINE METHYLSULFATE 3 MG: 1 INJECTION, SOLUTION INTRAVENOUS at 15:58

## 2020-09-15 RX ADMIN — ONDANSETRON 4 MG: 2 INJECTION INTRAMUSCULAR; INTRAVENOUS at 15:40

## 2020-09-15 RX ADMIN — LIDOCAINE HYDROCHLORIDE 0.2 ML: 10 INJECTION, SOLUTION EPIDURAL; INFILTRATION; INTRACAUDAL; PERINEURAL at 10:17

## 2020-09-15 RX ADMIN — SODIUM CHLORIDE, SODIUM LACTATE, POTASSIUM CHLORIDE, AND CALCIUM CHLORIDE 125 ML/HR: .6; .31; .03; .02 INJECTION, SOLUTION INTRAVENOUS at 10:17

## 2020-09-15 RX ADMIN — ROCURONIUM BROMIDE 50 MG: 10 INJECTION, SOLUTION INTRAVENOUS at 14:58

## 2020-09-15 RX ADMIN — SODIUM CHLORIDE, SODIUM LACTATE, POTASSIUM CHLORIDE, AND CALCIUM CHLORIDE 125 ML/HR: .6; .31; .03; .02 INJECTION, SOLUTION INTRAVENOUS at 16:30

## 2020-09-15 RX ADMIN — PROPOFOL 40 MG: 10 INJECTION, EMULSION INTRAVENOUS at 16:04

## 2020-09-15 RX ADMIN — LIDOCAINE HYDROCHLORIDE 50 MG: 10 INJECTION, SOLUTION EPIDURAL; INFILTRATION; INTRACAUDAL; PERINEURAL at 14:58

## 2020-09-15 RX ADMIN — FENTANYL CITRATE 100 MCG: 50 INJECTION, SOLUTION INTRAMUSCULAR; INTRAVENOUS at 14:58

## 2020-09-15 RX ADMIN — PROPOFOL 20 MCG/KG/MIN: 10 INJECTION, EMULSION INTRAVENOUS at 14:58

## 2020-09-15 RX ADMIN — SCOPALAMINE 1 PATCH: 1 PATCH, EXTENDED RELEASE TRANSDERMAL at 09:57

## 2020-09-15 RX ADMIN — EPHEDRINE SULFATE 10 MG: 50 INJECTION, SOLUTION INTRAVENOUS at 15:23

## 2020-09-15 RX ADMIN — DEXAMETHASONE SODIUM PHOSPHATE 8 MG: 10 INJECTION, SOLUTION INTRAMUSCULAR; INTRAVENOUS at 14:58

## 2020-09-15 RX ADMIN — ACETAMINOPHEN 650 MG: 325 TABLET, FILM COATED ORAL at 18:02

## 2020-09-15 RX ADMIN — MIDAZOLAM 2 MG: 1 INJECTION INTRAMUSCULAR; INTRAVENOUS at 14:46

## 2020-09-15 RX ADMIN — KETOROLAC TROMETHAMINE 15 MG: 30 INJECTION, SOLUTION INTRAMUSCULAR at 15:40

## 2020-09-15 RX ADMIN — GLYCOPYRROLATE 0.4 MG: 0.2 INJECTION, SOLUTION INTRAMUSCULAR; INTRAVENOUS at 15:58

## 2020-09-15 RX ADMIN — PROPOFOL 200 MG: 10 INJECTION, EMULSION INTRAVENOUS at 14:58

## 2020-09-15 NOTE — ANESTHESIA POSTPROCEDURE EVALUATION
Post-Op Assessment Note    CV Status:  Stable  Pain Score: 0    Pain management: adequate     Mental Status:  Alert and awake   Hydration Status:  Euvolemic   PONV Controlled:  Controlled   Airway Patency:  Patent  Airway: intubated   Two or more mitigation strategies used for obstructive sleep apnea   Post Op Vitals Reviewed: Yes            No complications documented      /70 (09/15/20 1613)    Temp 98 1 °F (36 7 °C) (09/15/20 1613)    Pulse 96 (09/15/20 1613)   Resp 15 (09/15/20 1613)    SpO2 100 % (09/15/20 1613)

## 2020-09-29 ENCOUNTER — OFFICE VISIT (OUTPATIENT)
Dept: OBGYN CLINIC | Facility: CLINIC | Age: 35
End: 2020-09-29

## 2020-09-29 VITALS
WEIGHT: 191.4 LBS | HEART RATE: 77 BPM | HEIGHT: 65 IN | DIASTOLIC BLOOD PRESSURE: 84 MMHG | TEMPERATURE: 98 F | BODY MASS INDEX: 31.89 KG/M2 | SYSTOLIC BLOOD PRESSURE: 124 MMHG

## 2020-09-29 DIAGNOSIS — Z98.890 POSTOPERATIVE STATE: Primary | ICD-10-CM

## 2020-09-29 DIAGNOSIS — R87.613 HIGH GRADE SQUAMOUS INTRAEPITHELIAL LESION OF CERVIX: Chronic | ICD-10-CM

## 2020-09-29 PROCEDURE — 99213 OFFICE O/P EST LOW 20 MIN: CPT | Performed by: OBSTETRICS & GYNECOLOGY

## 2020-09-29 NOTE — PROGRESS NOTES
Assessment/Plan:     No problem-specific Assessment & Plan notes found for this encounter  Diagnoses and all orders for this visit:    Postoperative state    High grade squamous intraepithelial lesion of cervix    RTO in 6 months for repap and ECC  Subjective:      Patient ID: Kaila Farooq is a 37 y o  female who presents s/p :  Procedure(s) (LRB):  SALPINGECTOMY, LAPAROSCOPIC (Bilateral)  BIOPSY LEEP CERVIX (N/A)  REMOVAL OF INTRAUTERINE DEVICE (IUD) (N/A)  On 09-15-20  Her pathology is as follow:    Final Diagnosis    A  Bilateral fallopian tubes (salpingectomy):     - Benign fallopian tubes with patchy mild salpingitis      B  Endocervix (LEEP):     - Cervical transformation zone tissue with marked thermal artifact; negative for high-grade dysplasia  - Cervicitis and mucosal erosion noted      C  6:00, cervix (LEEP):     - High-grade squamous intraepithelial lesion (MARIANO 3) with endocervical gland extension      - Cauterized HSIL present at mucosal resection surface       - Cervicitis and mucosal erosion noted      D  12:00, cervix (LEEP):      - Cervical transformation zone tissue with reactive-type change; negative for high-grade dysplasia  - Cervicitis and mucosal erosion noted  HPI    The following portions of the patient's history were reviewed and updated as appropriate: allergies, current medications, past family history, past medical history, past social history, past surgical history and problem list     Review of Systems      Objective:      /84   Pulse 77   Temp 98 °F (36 7 °C)   Ht 5' 5" (1 651 m)   Wt 86 8 kg (191 lb 6 4 oz)   LMP 09/04/2020 (Exact Date)   BMI 31 85 kg/m²          Physical Exam  Vitals signs and nursing note reviewed  Constitutional:       Appearance: Normal appearance  Pulmonary:      Effort: Pulmonary effort is normal    Abdominal:      General: Bowel sounds are normal       Palpations: Abdomen is soft        Comments: Incisions c/d/i   Neurological:      General: No focal deficit present  Mental Status: She is alert and oriented to person, place, and time     Psychiatric:         Mood and Affect: Mood normal          Behavior: Behavior normal

## 2021-03-26 ENCOUNTER — TELEPHONE (OUTPATIENT)
Dept: OBGYN CLINIC | Facility: CLINIC | Age: 36
End: 2021-03-26

## 2021-05-24 ENCOUNTER — IMMUNIZATIONS (OUTPATIENT)
Dept: FAMILY MEDICINE CLINIC | Facility: HOSPITAL | Age: 36
End: 2021-05-24

## 2021-05-24 DIAGNOSIS — Z23 ENCOUNTER FOR IMMUNIZATION: Primary | ICD-10-CM

## 2021-05-24 PROCEDURE — 91300 SARS-COV-2 / COVID-19 MRNA VACCINE (PFIZER-BIONTECH) 30 MCG: CPT

## 2021-05-24 PROCEDURE — 0001A SARS-COV-2 / COVID-19 MRNA VACCINE (PFIZER-BIONTECH) 30 MCG: CPT

## 2021-05-25 ENCOUNTER — TELEPHONE (OUTPATIENT)
Dept: BARIATRICS | Facility: CLINIC | Age: 36
End: 2021-05-25

## 2021-05-25 NOTE — TELEPHONE ENCOUNTER
TRANSFER PT  SLEEVE/Marcel/DR FERRELL/ [facility]/ NEED OP NOTE AND PCP RECORDS TO BE FAXED OVER TO OUR OFFICE  GAVE PT FAX NUMBER [phone]

## 2021-06-11 ENCOUNTER — ANNUAL EXAM (OUTPATIENT)
Dept: OBGYN CLINIC | Facility: CLINIC | Age: 36
End: 2021-06-11

## 2021-06-11 VITALS
DIASTOLIC BLOOD PRESSURE: 81 MMHG | SYSTOLIC BLOOD PRESSURE: 113 MMHG | HEART RATE: 83 BPM | WEIGHT: 208.2 LBS | BODY MASS INDEX: 34.65 KG/M2

## 2021-06-11 DIAGNOSIS — Z01.419 ENCOUNTER FOR ANNUAL ROUTINE GYNECOLOGICAL EXAMINATION: Primary | ICD-10-CM

## 2021-06-11 PROCEDURE — G0145 SCR C/V CYTO,THINLAYER,RESCR: HCPCS | Performed by: NURSE PRACTITIONER

## 2021-06-11 PROCEDURE — S0612 ANNUAL GYNECOLOGICAL EXAMINA: HCPCS | Performed by: NURSE PRACTITIONER

## 2021-06-11 PROCEDURE — 87624 HPV HI-RISK TYP POOLED RSLT: CPT | Performed by: NURSE PRACTITIONER

## 2021-06-11 NOTE — PROGRESS NOTES
Annual Exam    Assessment   1  Encounter for annual routine gynecological examination  Liquid-based pap, screening     well woman       Plan       All questions answered  Breast self exam technique reviewed and patient encouraged to perform self-exam monthly  Contraception: tubal ligation  Discussed healthy lifestyle modifications  Follow up in 1 year  Thin prep Pap smear  Patient Instructions   PAP results can take up to 2 weeks  Call with needs or concerns  Return in 1 year  Pt verbalized understanding of all discussed  Sander Boucher is a 31 y o   female who presents for annual well woman exam  Periods are regular every 28-30 days, lasting 7 days  No intermenstrual bleeding, spotting, or discharge  1 partner x 12 years, denies domestic violence    Current contraception: tubal ligation  History of abnormal Pap smear: yes - ASCUS HGSIL, MARIANO II on colpo, LEEP 2020  Family history of uterine or ovarian cancer: no  Regular self breast exam: yes  History of abnormal mammogram: N/A  Family history of breast cancer: no  History of abnormal lipids: unknown  Menstrual History:  OB History        3    Para   3    Term   3            AB        Living   3       SAB        TAB        Ectopic        Multiple        Live Births   1                Menarche age: 8  Patient's last menstrual period was 2021 (exact date)  The following portions of the patient's history were reviewed and updated as appropriate: allergies, current medications, past family history, past medical history, past social history, past surgical history and problem list     Review of Systems  Pertinent items are noted in HPI        Objective      /81   Pulse 83   Wt 94 4 kg (208 lb 3 2 oz)   LMP 2021 (Exact Date)   BMI 34 65 kg/m²     General: alert and oriented, in no acute distress, alert, appears stated age and cooperative   Heart: regular rate and rhythm, S1, S2 normal, no murmur, click, rub or gallop   Lungs: clear to auscultation bilaterally, WNL respiratory effort, negative cough or SOB   Thyroid: Negative masses   Abdomen: soft, non-tender, without masses or organomegaly   Vulva: normal   Vagina: normal mucosa   Cervix: no bleeding following Pap, no cervical motion tenderness and no lesions   Uterus: normal size, non-tender, normal shape and consistency   Adnexa: normal adnexa   Urethra: normal   Breasts: NT,negative masses, discharge, or dimpling

## 2021-06-11 NOTE — LETTER
Jaelyn 15, 2021    [address] [place] [id number]      6/15/2021    To Krysten Hinds  : 1985      This letter is to advise you that your recent PAP SMEAR results were reviewed by me and are NORMAL    We will see you in 1 year for your annual exam     Wilmer Winkler

## 2021-06-11 NOTE — PATIENT INSTRUCTIONS
PAP results can take up to 2 weeks  Call with needs or concerns  Return in 1 year]    COVID-19 Instructions    If you are having any of the following:  Cough   Shortness of breath   Fever  If traveled within past 2 weeks internationally or to high risk [place] states  Or been in contact with someone that has     Please call either:   Your PCP office  [phone], option [phone]    They will screen you over the phone and direct you to the nearest appropriate testing location    DO NOT go to your PCP or OB office without calling first

## 2021-06-15 ENCOUNTER — IMMUNIZATIONS (OUTPATIENT)
Dept: FAMILY MEDICINE CLINIC | Facility: HOSPITAL | Age: 36
End: 2021-06-15

## 2021-06-15 DIAGNOSIS — Z23 ENCOUNTER FOR IMMUNIZATION: Primary | ICD-10-CM

## 2021-06-15 LAB
HPV HR 12 DNA CVX QL NAA+PROBE: NEGATIVE
HPV16 DNA CVX QL NAA+PROBE: NEGATIVE
HPV18 DNA CVX QL NAA+PROBE: NEGATIVE
LAB AP GYN PRIMARY INTERPRETATION: NORMAL
Lab: NORMAL

## 2021-06-15 PROCEDURE — 91300 SARS-COV-2 / COVID-19 MRNA VACCINE (PFIZER-BIONTECH) 30 MCG: CPT

## 2021-06-15 PROCEDURE — 0002A SARS-COV-2 / COVID-19 MRNA VACCINE (PFIZER-BIONTECH) 30 MCG: CPT

## 2022-01-07 ENCOUNTER — OFFICE VISIT (OUTPATIENT)
Dept: URGENT CARE | Age: 37
End: 2022-01-07
Payer: COMMERCIAL

## 2022-01-07 VITALS
RESPIRATION RATE: 20 BRPM | HEART RATE: 74 BPM | BODY MASS INDEX: 35.51 KG/M2 | OXYGEN SATURATION: 100 % | HEIGHT: 64 IN | WEIGHT: 208 LBS | TEMPERATURE: 98.6 F

## 2022-01-07 DIAGNOSIS — B34.9 VIRAL INFECTION: Primary | ICD-10-CM

## 2022-01-07 PROCEDURE — U0003 INFECTIOUS AGENT DETECTION BY NUCLEIC ACID (DNA OR RNA); SEVERE ACUTE RESPIRATORY SYNDROME CORONAVIRUS 2 (SARS-COV-2) (CORONAVIRUS DISEASE [COVID-19]), AMPLIFIED PROBE TECHNIQUE, MAKING USE OF HIGH THROUGHPUT TECHNOLOGIES AS DESCRIBED BY CMS-2020-01-R: HCPCS | Performed by: PHYSICIAN ASSISTANT

## 2022-01-07 PROCEDURE — 99213 OFFICE O/P EST LOW 20 MIN: CPT | Performed by: PHYSICIAN ASSISTANT

## 2022-01-07 RX ORDER — FLUTICASONE PROPIONATE 50 MCG
2 SPRAY, SUSPENSION (ML) NASAL DAILY
Qty: 16 G | Refills: 0 | Status: SHIPPED | OUTPATIENT
Start: 2022-01-07

## 2022-01-07 RX ORDER — CETIRIZINE HYDROCHLORIDE 10 MG/1
10 TABLET ORAL DAILY
Qty: 30 TABLET | Refills: 0 | Status: SHIPPED | OUTPATIENT
Start: 2022-01-07

## 2022-01-07 RX ORDER — BROMPHENIRAMINE MALEATE, PSEUDOEPHEDRINE HYDROCHLORIDE, AND DEXTROMETHORPHAN HYDROBROMIDE 2; 30; 10 MG/5ML; MG/5ML; MG/5ML
10 SYRUP ORAL 4 TIMES DAILY PRN
Qty: 120 ML | Refills: 0 | Status: SHIPPED | OUTPATIENT
Start: 2022-01-07

## 2022-01-07 NOTE — PROGRESS NOTES
[facility]        NAME: Jessica Spring is a 31 y o  female  : 1985    MRN: [id number]  DATE: 2022  TIME: 11:08 AM    Assessment and Plan   Viral infection [B34 9]  1  Viral infection  COVID Only -Office Collect    brompheniramine-pseudoephedrine-DM 30-2-10 MG/5ML syrup    fluticasone (FLONASE) 50 mcg/act nasal spray    cetirizine (ZyrTEC) 10 mg tablet         Patient Instructions     Use medications as directed for symptomatic relief  Motrin and/or Tylenol as needed for fevers body aches and pain  Follow up with PCP in 3-5 days  Proceed to  ER if symptoms worsen  Chief Complaint     Chief Complaint   Patient presents with    COVID-19     symptoms started on wednesday night  fever, congestion  History of Present Illness       62-year-old female presents with 2 day history of runny nose congestion cough fatigue body aches and pains headaches  Symptoms started yesterday  Reports that she does not having nausea vomiting or diarrhea  No chest pain or shortness of breath  URI   This is a new problem  The current episode started yesterday  The problem has been waxing and waning  Maximum temperature: Subjective  The fever has been present for less than 1 day  Associated symptoms include congestion, coughing, headaches, rhinorrhea and a sore throat  Pertinent negatives include no abdominal pain, chest pain, diarrhea, nausea, neck pain or vomiting  She has tried nothing for the symptoms  The treatment provided no relief  Review of Systems   Review of Systems   Constitutional: Positive for fever  HENT: Positive for congestion, rhinorrhea and sore throat  Respiratory: Positive for cough  Cardiovascular: Negative for chest pain  Gastrointestinal: Negative for abdominal pain, diarrhea, nausea and vomiting  Musculoskeletal: Negative for neck pain  Neurological: Positive for headaches           Current Medications       Current Outpatient Medications:    ferrous sulfate 325 (65 Fe) mg tablet, Take 325 mg by mouth daily with breakfast, Disp: , Rfl:     Multiple Vitamin (multivitamin) capsule, Take 1 capsule by mouth daily, Disp: , Rfl:     VITAMIN D PO, Take by mouth, Disp: , Rfl:     brompheniramine-pseudoephedrine-DM 30-2-10 MG/5ML syrup, Take 10 mL by mouth 4 (four) times a day as needed for congestion or cough, Disp: 120 mL, Rfl: 0    cetirizine (ZyrTEC) 10 mg tablet, Take 1 tablet (10 mg total) by mouth daily, Disp: 30 tablet, Rfl: 0    fluticasone (FLONASE) 50 mcg/act nasal spray, 2 sprays into each nostril daily, Disp: 16 g, Rfl: 0    PARAGARD INTRAUTERINE COPPER IU, by Intrauterine route (Patient not taking: Reported on 1/7/2022 ), Disp: , Rfl:     Current Allergies     Allergies as of 01/07/2022    (No Known Allergies)            The following portions of the patient's history were reviewed and updated as appropriate: allergies, current medications, past family history, past medical history, past social history, past surgical history and problem list      Past Medical History:   Diagnosis Date    Abnormal Pap smear of cervix     Anemia     History of sleep apnea        Past Surgical History:   Procedure Laterality Date    APPENDECTOMY      BARIATRIC SURGERY      MD COLPOSCOPY,CERVIX W/ADJ VAG,W/LOOP BX N/A 9/15/2020    Procedure: BIOPSY LEEP CERVIX;  Surgeon: Zackary Chanel MD;  [facility]: [facility];  Service: Gynecology    MD LAP,RMV  ADNEXAL STRUCTURE Bilateral 9/15/2020    Procedure: SALPINGECTOMY, LAPAROSCOPIC;  Surgeon: Zackary Chanel MD;  [facility];  Service: Gynecology    MD REMOVE INTRAUTERINE DEVICE N/A 9/15/2020    Procedure: REMOVAL OF INTRAUTERINE DEVICE (IUD); Surgeon: Zackary Chanel MD;  [facility];  Service: Gynecology       Family History   Problem Relation Age of Onset    Hypertension Mother     Hypertension Father          Medications have been verified          Objective   Pulse 74   Temp 98 6 °F (37 °C) Resp 20   Ht 5' 4" (1 626 m)   Wt 94 3 kg (208 lb)   SpO2 100%   BMI 35 70 kg/m²   No LMP recorded  Physical Exam     Physical Exam  Vitals and nursing note reviewed  Constitutional:       General: She is not in acute distress  Appearance: Normal appearance  She is well-developed  HENT:      Head: Normocephalic and atraumatic  Right Ear: Hearing, tympanic membrane, ear canal and external ear normal  There is no impacted cerumen  Left Ear: Hearing, tympanic membrane, ear canal and external ear normal  There is no impacted cerumen  Nose: Congestion and rhinorrhea present  Mouth/Throat:      Pharynx: Uvula midline  No oropharyngeal exudate  Eyes:      General:         Right eye: No discharge  Left eye: No discharge  Conjunctiva/sclera: Conjunctivae normal    Cardiovascular:      Rate and Rhythm: Normal rate and regular rhythm  Heart sounds: Normal heart sounds  No murmur heard  Pulmonary:      Effort: Pulmonary effort is normal  No respiratory distress  Breath sounds: Normal breath sounds  No wheezing or rales  Abdominal:      General: Bowel sounds are normal       Palpations: Abdomen is soft  Tenderness: There is no abdominal tenderness  Musculoskeletal:         General: Normal range of motion  Cervical back: Normal range of motion and neck supple  Lymphadenopathy:      Cervical: No cervical adenopathy  Skin:     General: Skin is warm and dry  Neurological:      Mental Status: She is alert and oriented to person, place, and time     Psychiatric:         Mood and Affect: Mood normal

## 2022-01-07 NOTE — LETTER
[facility] [address]  [address] [place] [address]  Dept: [phone]    January 7, 2022    Patient: Grace Andrew  YOB: 1985    Grace Andrew was seen and evaluated at our [facility]  Please note if Covid and Flu tests are negative, they may return to work when fever free for 24 hours without the use of a fever reducing agent  If Covid or Flu test is positive, they may return to work on 01/11/2022, as this is 5 days from the onset of symptoms  Upon return, they must then adhere to strict masking for an additional 5 days  Sincerely,    Awais Angel PA-C

## 2022-01-07 NOTE — PATIENT INSTRUCTIONS
Use medications as directed for symptomatic relief  Motrin and/or Tylenol as needed for fevers body aches and pain  Follow up with PCP in 3-5 days  Proceed to  ER if symptoms worsen  COVID-19 (Coronavirus Disease 2019)   AMBULATORY CARE:   Coronavirus disease 2019 (COVID-19)  is the disease caused by a coronavirus first discovered in December 2019  Coronaviruses generally cause upper respiratory (nose, throat, and lung) infections, such as a cold  The new virus spreads quickly and easily  The virus can be spread starting 2 days before symptoms even begin  The virus has also changed into several new forms (called variants) since it was discovered  The variants may be more contagious (easily spread) than the original form  Some may also cause more severe illness than others  It is important to follow local, national, and worldwide measures to protect yourself and others from infection  Signs and symptoms of COVID-19  may not develop  Signs and symptoms that develop usually start about 5 days after infection but can take 2 to 14 days  You may feel like you have the flu or a bad cold  Some signs and symptoms go away in a few days  Others can last weeks, months, or possibly years  Information on COVID-19 is still being learned   Tell your healthcare provider if you think you were infected but develop signs or symptoms not listed below:  · A cough    · Shortness of breath or trouble breathing that may become severe    · A fever of at least 100 4°F, or 38°C (may be lower in adults 65 or older)    · Chills that might include shaking    · Muscle pain, body aches, or a headache    · A sore throat    · Suddenly not being able to taste or smell anything    · Feeling mentally and physically tired (fatigue)    · Congestion (stuffy head and nose), or a runny nose    · Diarrhea, nausea, or vomiting    If you think you or someone you know may be infected:  Do the following to protect others:  · If emergency care is needed,  tell the  about the possible infection, or call ahead and tell the emergency department  · Call a healthcare provider  for instructions if symptoms are mild  Anyone who may be infected should not  arrive without calling first  The provider will need to protect staff members and other patients  · The person who may be infected needs to wear a face covering  while getting medical care  This will help lower the risk of infecting others  Coverings are not used for anyone who is younger than 2 years, has breathing problems, or cannot remove it  The provider can give you instructions for anyone who cannot wear a covering  Call your local emergency number (911 in the [address] [place] [address],[address]) or an emergency department if:   · You have trouble breathing or shortness of breath at rest     · You have chest pain or pressure that lasts longer than 5 minutes  · You become confused or hard to wake  · Your lips or face are blue  · You have a fever of 104°F (40°C) or higher  Call your doctor if:   · You do not  have symptoms of COVID-19 but had close physical contact within 14 days with someone who tested positive  · You have questions or concerns about your condition or care  How COVID-19 is diagnosed: If you think you have COVID-19, call your healthcare provider  He or she will tell you what to do based on your symptoms and testing guidelines in your area  In general, the following may be used:  · A viral test  shows if you have a current infection  Samples are taken from your nose and throat, usually with swabs  You may need to wait several days to get the test results  Your healthcare provider will tell you how to get your results  You will need to quarantine (stay physically away from others) until you get your results  If results show you have COVID-19, you will need to continue until you are well  Your provider or other health official may give you more directions   You will also need to prevent another infection until it is known if you can get COVID-19 again  · An antibody test  shows if you had a past infection  Blood samples are used for this test  Antibodies are made by your immune system to attack the virus that causes COVID-19  Antibodies will form 1 to 3 weeks after you are infected  It is not known if antibodies prevent a second infection, or for how long a person might be protected  If you have antibodies, you will still need to be careful around others until more is known  · CT scans or x-rays  may be used to check for signs of pneumonia  The 2019 coronavirus causes a specific kind of pneumonia, usually in both lungs  The pictures may also be used to check for health problems in other parts of your body  Treatment  such as monoclonal antibodies and convalescent plasma have emergency use authorization (EUA)  This means they may be given only to patients who are hospitalized with severe signs and symptoms  The following may be used to manage your symptoms:  · Mild symptoms  may get better on their own  If you do not need to be treated in a hospital, you will be given instructions to use at home  Your condition will be closely monitored  You will need to watch for worsening symptoms and seek immediate care if needed  Talk to your healthcare provider about the following:    ? Decongestants  help reduce nasal congestion and help you breathe more easily  If you take decongestant pills, they may make you feel restless or cause problems with your sleep  Do not use decongestant sprays for more than a few days  ? Cough suppressants  help reduce coughing  Ask your healthcare provider which type of cough medicine is best for you  ? To soothe a sore throat,  gargle with warm salt water, or use throat lozenges or a throat spray  Drink more liquids to thin and loosen mucus and to prevent dehydration  ? NSAIDs or acetaminophen  can help lower a fever and relieve body aches or a headache  Follow directions   If not taken correctly, NSAIDs can cause kidney damage and acetaminophen can cause liver damage  · Severe or life-threatening symptoms  are treated in the hospital  You may need a combination of the following:    ? Medicines  may be given to lower or prevent inflammation or to fight the virus  You may also need blood thinners to prevent or treat blood clots  If you have a deep vein thrombosis (DVT) or pulmonary embolism (PE), you may need to keep using blood thinners for 3 months  ? Extra oxygen  may be given if you have respiratory failure  This means your lungs cannot get enough oxygen into your blood and out to your organs  Extra oxygen can help prevent organ failure  ? A ventilator  may be used to help you breathe  What you need to know about health problems the virus may cause:  Serious health problems may improve or continue for weeks, months, and possibly years  Health problems that continue may be called long COVID  Anyone can develop serious problems from this virus, but your risk is higher if you are 72 or older  A weak immune system, diabetes, or a heart or lung condition can also increase your risk  Your risk is also higher if you are a current or former cigarette smoker  COVID-19 can lead to any of the following:  · Multisymptom inflammatory syndrome in adults (MIS-A) or in children (MIS-C), causing inflammation in the heart, digestive system, skin, or brain    · Serious lower respiratory conditions, such as pneumonia or acute respiratory distress syndrome (ARDS)    · Blood vessel damage, leading to blood clots    · Organ damage from a lack of oxygen or from blood clots    · Sleep problems    · Problems thinking clearly, remembering information, or concentrating    · Mood changes, depression, or anxiety    What you need to know about COVID-19 vaccines:  Get a vaccine even if you already had COVID-19  · COVID-19 vaccines are given as a shot in 1 or 2 doses    Some vaccines have emergency use authorization (EUA)  An EUA means the vaccine is not approved but is given because the benefits outweigh the risks  A 2-dose vaccine is fully approved for use in those 16 years or older  This vaccine also has an EUA for adolescents 12 to 15 years  Your healthcare provider can help you understand the benefits and risks  · A third dose is recommended for adults with a weakened immune system who get a 2-dose vaccine  The third dose is given at least 28 days after the second  · Even after you get the vaccine, continue social distancing and other measures  Experts are still learning how well the vaccines work to prevent infection, transmission, and severe illness  Although rare, you can become infected after you get the vaccine  You may also be able to pass the virus to others without knowing you are infected  · After you get the vaccine, check local, national, and international travel rules  Check to see if you need to be tested before you travel  You may also need to quarantine after you return  Some countries require proof of a negative test before you leave  You should also be tested 3 to 5 days after you return from another country  How the 2019 coronavirus spreads: The following are ways the virus is thought to spread, but more information may be coming:  · Droplets are the main way all coronaviruses spread  The virus travels in droplets that form when a person talks, coughs, or sneezes  The droplets can also float in the air for minutes or hours  Infection happens when you breathe in the droplets or get them in your eyes or nose  Close personal contact with an infected person increases your risk for infection  This means being within 6 feet (2 meters) of the person for at least 15 minutes over 24 hours  · Person-to-person contact can spread the virus  For example, a person with the virus on his or her hands can spread it by shaking hands with someone      · The virus can stay on objects and surfaces for a short time  You may become infected by touching the object or surface and then touching your eyes or mouth  · An infected animal may be able to infect a person who touches it  This may happen at live markets or on a farm  Help lower the risk for COVID-19:  The best way to prevent infection is to avoid anyone who is infected, but this can be hard to do  An infected person can spread the virus before signs or symptoms begin, or even if signs or symptoms never develop  The following can help lower the risk for infection:      · Wash your hands often throughout the day  Use soap and water  Rub your soapy hands together, lacing your fingers, for at least 20 seconds  Rinse with warm, running water  Dry your hands with a clean towel or paper towel  Use hand  that contains alcohol if soap and water are not available  Teach children how to wash their hands and use hand   · Cover sneezes and coughs  Turn your face away and cover your mouth and nose with a tissue  Throw the tissue away  Use the bend of your arm if a tissue is not available  Then wash your hands well with soap and water or use hand   Teach children how to cover a cough or sneeze  · Wear a face covering (mask) around anyone who does not live in your home  Use a cloth covering with at least 2 layers  You can also create layers by putting a cloth covering over a disposable non-medical mask  Cover your mouth and your nose  The covering should fit snugly against the bridge of your nose  Securely fasten it under your chin and on the sides of your face  Do not  wear a plastic face shield instead of a covering  Continue social distancing and washing your hands often  A face covering is not a substitute for social distancing safety measures  · Follow worldwide, national, and local social distancing guidelines  Keep at least 6 feet (2 meters) between you and others   Also keep this distance from anyone who comes to your home, such as someone making a delivery  Wear a face covering while you are around others  You will need to wear a covering in restaurants, stores, and other public buildings  You will also need a covering on mass transit, such as a bus, subway, or airplane  Remember to use a covering made from thick material or wear 2 coverings together  · Make a habit of not touching your face  If you get the virus on your hands, you can transfer it to your eyes, nose, or mouth and become infected  You can also transfer it to objects, surfaces, or people  Do not touch your eyes, nose, or mouth without washing your hands first     · Clean and disinfect high-touch surfaces and objects often  Use disinfecting wipes, or make a solution of 4 teaspoons of bleach in 1 quart (4 cups) of water  Clean and disinfect even if you think no one living in or coming to your home is infected with the virus  · Ask about other vaccines you may need  Get the influenza (flu) vaccine as soon as recommended each year, usually starting in September or October  Get the pneumonia vaccine if recommended  Your healthcare provider can tell you if you should also get other vaccines, and when to get them  Follow social distancing guidelines:  National and local social distancing rules vary  Rules may change over time as restrictions are lifted  Restrictions may return if an outbreak happens where you live  It is important to know and follow all current social distancing rules in your area  The following are general guidelines:  · Stay home if you are sick or think you may have COVID-19  It is important to stay home if you are waiting for a testing appointment or for test results  Even if you do not have symptoms, you can pass the virus to others  · Limit trips out of your home  Have food, medicines, and other supplies delivered and left at your door or other area, if possible   Plan trips out of your home so you make the fewest stops possible to limit close personal contact  Keep track of places you go  This will help contact tracers notify others if you become infected  · Avoid close physical contact with anyone who does not live in your home  Do not shake hands with, hug, or kiss a person as a greeting  If you must use public transportation (such as a bus or subway), try to sit or stand away from others  Only allow necessary people into your home  Wear your face covering, and remind others to wear a face covering  Remind them to wash their hands when they arrive and before they leave  Do not  let someone into your home or go to someone's home just to visit  Even if you both do not feel sick, the virus can pass from one of you to the other  · Avoid in-person gatherings and crowds  Gatherings or crowds of 10 or more individuals can cause the virus to spread  Avoid places such as taveras, beaches, sporting events, and tourist attractions  For events such as parties, holiday meals, Gnosticist services, and conferences, attend virtually (on a computer), if possible  · Ask your healthcare provider for other ways to have appointments  Some providers offer phone, video, or other types of appointments  You may also be able to get prescriptions for a few months of your medicines at a time  · Stay safe if you must go out to work  Keep physical distance between you and other workers as much as possible  Follow your employer's rules so everyone stays safe  If you have COVID-19 and are recovering at home,  healthcare providers will give you specific instructions to follow  The following are general guidelines to remind you how to keep others safe until you are well:  · Wash your hands often  Use soap and water as much as possible  Use hand  that contains alcohol if soap and water are not available  Dry your hands with a clean towel or paper towel  Do not share towels with anyone   If you use paper towels, throw them away in a lined trash can kept in your room or area  Use a covered trash can, if possible  · Do not go out of your home unless it is necessary  Ask someone who is not infected to go out for groceries or supplies, or have them delivered  Do not go to your healthcare provider's office without an appointment  · Only have close physical contact with a person giving direct care, or a baby or child you must care for  Family members and friends should not visit you  If possible, stay in a separate area or room of your home if you live with others  No one should go into the area or room except to give you care  You can visit with others by phone, video chat, e-mail, or similar systems  · Wear a face covering while others are near you  This can help prevent droplets from spreading the virus when you talk, sneeze, or cough  Put the covering on before anyone comes into your room or area  Remind the person to cover his or her nose and mouth before coming in to provide care for you  · Do not share items  Do not share dishes, towels, or other items with anyone  Items need to be washed after you use them  · Protect your baby  Some newborns have tested positive for the virus  It is not known if they became infected before or after birth  The highest risk is when a  has close contact with an infected person  If you are pregnant or breastfeeding, talk to your healthcare provider or obstetrician about any concerns you have  He or she will tell you when to bring your baby in for check-ups and vaccines  He or she will also tell you what to do if you think your baby was infected with the coronavirus  Wash your hands and put on a clean face covering before you breastfeed or care for your baby  · Do not handle live animals unless it is necessary  Some animals, including pets, have been infected with the new coronavirus  Ask someone who is not infected to take care of your pet until you are well   If you must care for a pet, wear a face covering  Wash your hands before and after you give care  Talk to your healthcare provider about how to keep a service animal safe, if needed  · Follow directions from your healthcare provider for being around others after you recover  It is not known if or for how long a recovered person can pass the virus to others  Your provider may give you instructions, such as continuing social distancing and wearing a face covering  He or she will tell you when it is okay to be around others again  This may be 10 to 20 days after symptoms started or you had a positive test  Most symptoms will also need to be gone  Your provider will give you more information  Follow up with your doctor as directed:  Write down your questions so you remember to ask them during your visits  For more information:   · Centers for Disease Control and Prevention  [address] , [address]  Phone: 2- 307 - [phone]  Web Address: [employer]    © [address] [facility] 2021 Information is for End User's use only and may not be sold, redistributed or otherwise used for commercial purposes  All illustrations and images included in CareNotes® are the copyrighted property of [employer]  or [address] [place], [place] [address]carolann   The above information is an  only  It is not intended as medical advice for individual conditions or treatments  Talk to your doctor, nurse or pharmacist before following any medical regimen to see if it is safe and effective for you

## 2022-01-10 LAB — SARS-COV-2 RNA RESP QL NAA+PROBE: POSITIVE

## 2022-06-22 ENCOUNTER — HOSPITAL ENCOUNTER (OUTPATIENT)
Dept: RADIOLOGY | Facility: IMAGING CENTER | Age: 37
Discharge: HOME | End: 2022-06-22
Payer: COMMERCIAL

## 2022-06-22 DIAGNOSIS — M54.16 LUMBAR RADICULOPATHY: ICD-10-CM

## 2022-06-22 PROCEDURE — 72148 MRI LUMBAR SPINE W/O DYE: CPT

## 2023-01-25 ENCOUNTER — OFFICE VISIT (OUTPATIENT)
Dept: OBGYN CLINIC | Facility: CLINIC | Age: 38
End: 2023-01-25

## 2023-01-25 VITALS
HEART RATE: 85 BPM | SYSTOLIC BLOOD PRESSURE: 119 MMHG | WEIGHT: 219.4 LBS | BODY MASS INDEX: 36.55 KG/M2 | DIASTOLIC BLOOD PRESSURE: 84 MMHG | HEIGHT: 65 IN

## 2023-01-25 DIAGNOSIS — Z01.419 ENCOUNTER FOR GYNECOLOGICAL EXAMINATION (GENERAL) (ROUTINE) WITHOUT ABNORMAL FINDINGS: Primary | ICD-10-CM

## 2023-01-25 DIAGNOSIS — Z20.2 POSSIBLE EXPOSURE TO STD: ICD-10-CM

## 2023-01-25 DIAGNOSIS — Z12.4 CERVICAL CANCER SCREENING: ICD-10-CM

## 2023-01-25 PROBLEM — Z30.432 ENCOUNTER FOR IUD REMOVAL: Chronic | Status: RESOLVED | Noted: 2020-08-20 | Resolved: 2023-01-25

## 2023-01-25 NOTE — PROGRESS NOTES
Assessment/Plan:     No problem-specific Assessment & Plan notes found for this encounter  Diagnoses and all orders for this visit:    Encounter for gynecological examination (general) (routine) without abnormal findings    Cervical cancer screening  -     Liquid-based pap, screening    Possible exposure to STD  -     Chlamydia/GC amplified DNA by PCR      Depression Screening Follow-up Plan: Patient's depression screening was positive with a PHQ-2 score of 0  Their PHQ-9 score was 0  Clinically patient does not have depression  No treatment is required  BMI Counseling: Body mass index is 36 51 kg/m²  The BMI is above normal  Patient referred to PCP due to patient being obese  Subjective:      Patient ID: Rupert Segovia is a 30 y o  female who presents for annual exam   Her last pap was 06/11/21 and was negative with negative HPV  She had a LEEP and salpingectomy done in 2020  She agrees to STD testing  HPI    The following portions of the patient's history were reviewed and updated as appropriate: allergies, current medications, past family history, past medical history, past social history, past surgical history and problem list     Review of Systems      Objective:      /84   Pulse 85   Ht 5' 5" (1 651 m)   Wt 99 5 kg (219 lb 6 4 oz)   LMP 01/07/2023 (Exact Date)   BMI 36 51 kg/m²          Physical Exam  Vitals and nursing note reviewed  Exam conducted with a chaperone present  Constitutional:       General: She is not in acute distress  Appearance: She is well-developed  HENT:      Head: Normocephalic  Neck:      Thyroid: No thyromegaly  Cardiovascular:      Rate and Rhythm: Normal rate and regular rhythm  Heart sounds: Normal heart sounds  No murmur heard  Pulmonary:      Effort: Pulmonary effort is normal  No respiratory distress  Breath sounds: Normal breath sounds  No wheezing or rales  Chest:      Chest wall: No tenderness     Breasts: Right: No swelling, bleeding, inverted nipple, mass, nipple discharge, skin change or tenderness  Left: No swelling, bleeding, inverted nipple, mass, nipple discharge, skin change or tenderness  Abdominal:      General: Bowel sounds are normal  There is no distension  Palpations: Abdomen is soft  There is no mass  Tenderness: There is no abdominal tenderness  There is no guarding or rebound  Genitourinary:     Labia:         Right: No rash, tenderness, lesion or injury  Left: No rash, tenderness, lesion or injury  Vagina: Normal       Cervix: Normal       Uterus: Normal        Adnexa:         Right: No mass, tenderness or fullness  Left: No mass, tenderness or fullness  Rectum: No external hemorrhoid  Skin:     General: Skin is warm and dry  Neurological:      Mental Status: She is alert and oriented to person, place, and time  Psychiatric:         Behavior: Behavior normal          Thought Content:  Thought content normal          Judgment: Judgment normal

## 2023-01-25 NOTE — PROGRESS NOTES
Mayur Bailey 1985 female MRN: [id number]    Annual GYN/PAP Visit      ASSESSMENT & PLAN: Mayur Bailey is a 30 y o   with {NORMAL/ABNORMAL FNPF:45968} gynecologic exam     1   Routine well woman exam done today  2  Pap and HPV:  The patient's last pap was ***  Pap and cotesting {DONE/NOT DONE:2765142845::"was not"} done today  STI testing {DONE/NOT DONE:3865628488::"was not"} done today  Current ASCCP Guidelines reviewed  ***  3   The following were reviewed in today's visit: {Gyn counselin}  4  ***    CC:  Annual Gynecologic Examination    HPI: Mayur Bailey is a 30 y o  [id number] who presents for annual gynecologic examination  She has the following concerns:  ***    Past Medical/Surgical History:     Past Medical History:   Diagnosis Date   • Abnormal Pap smear of cervix    • Anemia    • History of sleep apnea        Past Surgical History:   Procedure Laterality Date   • APPENDECTOMY     • BARIATRIC SURGERY     • NE COLPOSCOPY CERVIX VAG LOOP ELTRD BX CERVIX N/A 9/15/2020    Procedure: BIOPSY LEEP CERVIX;  Surgeon: Melissa Bruno MD;  [facility];  Service: Gynecology   • NE LAPAROSCOPY W/RMVL ADNEXAL STRUCTURES Bilateral 9/15/2020    Procedure: SALPINGECTOMY, LAPAROSCOPIC;  Surgeon: Melissa Bruno MD;  [facility];  Service: Gynecology   • NE REMOVAL INTRAUTERINE DEVICE IUD N/A 9/15/2020    Procedure: REMOVAL OF INTRAUTERINE DEVICE (IUD); Surgeon: Melissa Bruno MD;  [facility];  Service: Gynecology       Past OB/Gyn History:  OB History        3    Para   3    Term   3            AB        Living   3       SAB        IAB        Ectopic        Multiple        Live Births   3                  Patient's last menstrual period was 2023 (exact date)  Pt {HAS/DOES NOT VDES:97202} menstrual issues  ***   History of sexually transmitted infection: {YES/NO:58596}  History of abnormal pap smears: {YES/NO:69845} ***      Patient {IS/ IS CUN:52826} currently sexually active  {Sexual social history md:33648}  The current method of family planning is {contraception:749738}  Family History   Problem Relation Age of Onset   • Hypertension Mother    • Hypertension Father        Social History:  Social History     Socioeconomic History   • Marital status: /Civil Union     Spouse name: Not on file   • Number of children: 3   • Years of education: Not on file   • Highest education level: Some college, no degree   Occupational History   • Occupation: [employer]    Tobacco Use   • Smoking status: Never   • Smokeless tobacco: Never   Substance and Sexual Activity   • Alcohol use: Not Currently   • Drug use: Never   • Sexual activity: Yes     Partners: Male     Birth control/protection: Female Sterilization   Other Topics Concern   • Not on file   Social History Narrative   • Not on file     Social Determinants of Health     Financial Resource Strain: High Risk   • Difficulty of Paying Living Expenses: Very hard   Food Insecurity: Food Insecurity Present   • Worried About Running Out of Food in the Last Year: Sometimes true   • Ran Out of Food in the Last Year: Often true   Transportation Needs: Unmet Transportation Needs   • Lack of Transportation (Medical): Yes   • Lack of Transportation (Non-Medical): Patient refused   Physical Activity: Not on file   Stress: Not on file   Social Connections: Not on file   Intimate Partner Violence: Not on file   Housing Stability: Not on file       Presently lives with ***  Patient is {Desc; marital status:62}  Patient is currently {[facility] Employment:21625} ***  DV Screening: The patient {DOES/DOES KFF:92130} feel safe at home       No Known Allergies      Current Outpatient Medications:   •  brompheniramine-pseudoephedrine-DM 30-2-10 MG/5ML syrup, Take 10 mL by mouth 4 (four) times a day as needed for congestion or cough (Patient not taking: Reported on 1/25/2023), Disp: 120 mL, Rfl: 0  •  cetirizine (ZyrTEC) 10 mg tablet, Take 1 tablet (10 mg total) by mouth daily (Patient not taking: Reported on 1/25/2023), Disp: 30 tablet, Rfl: 0  •  ferrous sulfate 325 (65 Fe) mg tablet, Take 325 mg by mouth daily with breakfast (Patient not taking: Reported on 1/25/2023), Disp: , Rfl:   •  fluticasone (FLONASE) 50 mcg/act nasal spray, 2 sprays into each nostril daily (Patient not taking: Reported on 1/25/2023), Disp: 16 g, Rfl: 0  •  Multiple Vitamin (multivitamin) capsule, Take 1 capsule by mouth daily (Patient not taking: Reported on 1/25/2023), Disp: , Rfl:   •  PARAGARD INTRAUTERINE COPPER IU, by Intrauterine route (Patient not taking: Reported on 1/7/2022), Disp: , Rfl:   •  VITAMIN D PO, Take by mouth (Patient not taking: Reported on 1/25/2023), Disp: , Rfl:     Review of Systems  Constitutional: feels well, no tiredness, no recent unintended weight gain or loss  ENT: no nosebleeds or hoarseness  Cardiovascular: no chest pain, no palpitations, no lower extremity edema  Respiratory: no complaints of shortness of shortness of breath  Breasts:no complaints of breast pain, breast lump, or nipple discharge  Gastrointestinal: no complaints of abdominal pain, constipation, nausea, vomiting, or diarrhea or bloody stools  Genitourinary : no complaints of dysuria, incontinence, pelvic pain, no dysmenorrhea, vaginal discharge or abnormal vaginal bleeding and as noted in HPI  Musculoskeletal: no complaints of arthralgia, no myalgia, no joint swelling or stiffness, no limb pain or swelling    Integumentary: no complaints of skin rash or lesion, itching or dry skin  Neurological: no complaints of headache, no dizziness or fainting  ***    Objective          /84   Pulse 85   Ht 5' 5" (1 651 m)   Wt 99 5 kg (219 lb 6 4 oz)   LMP 01/07/2023 (Exact Date)   BMI 36 51 kg/m²     General:   appears stated age, cooperative, alert   Neck: normal, supple, no masses   Heart: regular rate and rhythm, no murmur   Lungs: clear to auscultation bilaterally Breasts: {EXAM; BREAST:39913}   Abdomen: soft, non-tender, without masses or organomegaly   Vulva: {EXAM; GYN MROHP:72814}   Vagina: {exam; vagina:89097}   Urethra: {Urethra:65207}   Cervix: {PE Cervix:99999::"Normal, no discharge "}   Uterus: {exam; uterus:68407}   Adnexa: {exam; adnexa:31949}   Lymphatic palpation of lymph nodes in neck, axilla, groin and/or other locations: no lymphadenopathy or masses noted   Skin normal skin turgor and no rashes     Psychiatric orientation to person, place, and time: normal  mood and affect: normal       Loy Dave MD  PGY-3, Family Medicine  01/25/23

## 2023-01-27 LAB
C TRACH DNA SPEC QL NAA+PROBE: NEGATIVE
HPV HR 12 DNA CVX QL NAA+PROBE: NEGATIVE
HPV16 DNA CVX QL NAA+PROBE: NEGATIVE
HPV18 DNA CVX QL NAA+PROBE: NEGATIVE
N GONORRHOEA DNA SPEC QL NAA+PROBE: NEGATIVE

## 2023-01-31 LAB
LAB AP GYN PRIMARY INTERPRETATION: NORMAL
Lab: NORMAL

## 2023-03-17 ENCOUNTER — OFFICE VISIT (OUTPATIENT)
Dept: BARIATRICS | Facility: CLINIC | Age: 38
End: 2023-03-17

## 2023-03-17 VITALS
TEMPERATURE: 98.2 F | WEIGHT: 228 LBS | BODY MASS INDEX: 36.64 KG/M2 | SYSTOLIC BLOOD PRESSURE: 120 MMHG | DIASTOLIC BLOOD PRESSURE: 80 MMHG | HEART RATE: 91 BPM | HEIGHT: 66 IN

## 2023-03-17 DIAGNOSIS — K91.2 POSTSURGICAL MALABSORPTION: ICD-10-CM

## 2023-03-17 DIAGNOSIS — Z48.815 ENCOUNTER FOR SURGICAL AFTERCARE FOLLOWING SURGERY OF DIGESTIVE SYSTEM: Primary | ICD-10-CM

## 2023-03-17 DIAGNOSIS — E66.9 OBESITY, CLASS II, BMI 35-39.9: ICD-10-CM

## 2023-03-17 DIAGNOSIS — Z98.84 BARIATRIC SURGERY STATUS: ICD-10-CM

## 2023-03-17 NOTE — PROGRESS NOTES
Assessment/Plan:     Patient ID: Mary Mcduffie is a 30 y o  female  Bariatric Surgery Status    -s/p Vertical Sleeve Gastrectomy with Dr Yuly Pedroza I 2017 at [facility]) in [place]  Presents to the office today for annual as transfer patient    Her highest weight prior to surgery was around 235 lbs and lowest was 187 lbs about 1 5 years postop  Denies any postop complications  She reports started regaining weight gradually around 2018 despite exercise  In 2020 she started gaining more significant amount of weight  Overall states she eats a healthy diet  She would be interested in revision is appropriate  She states initially the surgeon had recommended bypass but patient ultimately chose the sleeve  Will get UGI and patient to follow up with surgeon to discuss possible revision vs MWM    · Continued/Maintain healthy weight loss with good nutrition intakes  · Adequate hydration with at least 64oz  fluid intake  · Follow diet as discussed  · Follow vitamin and mineral recommendations as reviewed with you  · Exercise as tolerated  · Colonoscopy referral made: not in age range    · Follow-up as scheduled after UGI  We kindly ask that your arrive 15 minutes before your scheduled appointment time with your provider to allow our staff to room you, get your vital signs and update your chart  · Get lab work done  Please call the office if you need a script  It is recommended to check with your insurance BEFORE getting labs done to make sure they are covered by your policy  · Call our office if you have any problems with abdominal pain especially associated with fever, chills, nausea, vomiting or any other concerns  · All  Post-bariatric surgery patients should be aware that very small quantities of any alcohol can cause impairment and it is very possible not to feel the effect  The effect can be in the system for several hours  It is also a stomach irritant       · It is advised to AVOID alcohol, Nonsteroidal antiinflammatory drugs (NSAIDS) and nicotine of all forms   Any of these can cause stomach irritation/pain  · Discussed the effects of alcohol on a bariatric patient and the increased impairment risk  · Keep up the good work! Postsurgical Malabsorption   -At risk for malabsorption of vitamins/minerals secondary to malabsorption and restriction of intake from bariatric surgery  -Currently taking adequate postop bariatric surgery vitamin supplementation  -Next set of bariatric labs ordered for approximately 2 weeks  -Patient received education about the importance of adhering to a lifelong supplementation regimen to avoid vitamin/mineral deficiencies      Diagnoses and all orders for this visit:    Encounter for surgical aftercare following surgery of digestive system  -     Zinc; Future  -     Vitamin D 25 hydroxy; Future  -     Vitamin B12; Future  -     Vitamin B1, whole blood; Future  -     Vitamin A; Future  -     PTH, intact; Future  -     CBC and Platelet; Future  -     Comprehensive metabolic panel; Future  -     Ferritin; Future  -     Folate; Future  -     Iron Saturation %; Future  -     FL UPPER GI UGI; Future    Bariatric surgery status  -     Zinc; Future  -     Vitamin D 25 hydroxy; Future  -     Vitamin B12; Future  -     Vitamin B1, whole blood; Future  -     Vitamin A; Future  -     PTH, intact; Future  -     CBC and Platelet; Future  -     Comprehensive metabolic panel; Future  -     Ferritin; Future  -     Folate; Future  -     Iron Saturation %; Future  -     FL UPPER GI UGI; Future    Postsurgical malabsorption  -     Zinc; Future  -     Vitamin D 25 hydroxy; Future  -     Vitamin B12; Future  -     Vitamin B1, whole blood; Future  -     Vitamin A; Future  -     PTH, intact; Future  -     CBC and Platelet; Future  -     Comprehensive metabolic panel; Future  -     Ferritin; Future  -     Folate; Future  -     Iron Saturation %;  Future  -     FL UPPER GI UGI; Future    Obesity, Class II, BMI 35-39 9  -     Zinc; Future  -     Vitamin D 25 hydroxy; Future  -     Vitamin B12; Future  -     Vitamin B1, whole blood; Future  -     Vitamin A; Future  -     PTH, intact; Future  -     CBC and Platelet; Future  -     Comprehensive metabolic panel; Future  -     Ferritin; Future  -     Folate; Future  -     Iron Saturation %; Future  -     FL UPPER GI UGI; Future    Other orders  -     cyanocobalamin (VITAMIN B-12) 50 MCG tablet; Take 50 mcg by mouth         Subjective:      Patient ID: Tammie Yuan is a 30 y o  female  -s/p Vertical Sleeve Gastrectomy with Dr Susan Bailey I 2017 at [facility]  Presents to the office today for annual as transfer patient Tolerating diet without issues; denies N/V, dysphagia, reflux  Initial:235  Current:228  EWL: (Weight loss is ahead of schedule at this post surgical period )  Austyn: 187  Current BMI is Body mass index is 37 36 kg/m²  · Tolerating a regular diet-yes  · Eating at least 60 grams of protein per day-yes  · Following 30/60 minute rule with liquids-yes  · Drinking at least 64 ounces of fluid per day-yes  · Drinking carbonated beverages-occasional   · Sufficient exercise- mostly walking currently   · Using NSAIDs regularly-no  · Using nicotine-no  · Using alcohol-occasional   · Supplements: gummy mvi + vit d    The following portions of the patient's history were reviewed and updated as appropriate: allergies, current medications, past family history, past medical history, past social history, past surgical history and problem list     Review of Systems   Constitutional: Negative  Respiratory: Negative  Cardiovascular: Negative  Gastrointestinal: Negative  Musculoskeletal: Negative  Skin: Negative  Neurological: Negative  Psychiatric/Behavioral: Negative            Objective:    /80   Pulse 91   Temp 98 2 °F (36 8 °C) (Tympanic)   Ht 5' 5 5" (1 664 m)   Wt 103 kg (228 lb)   BMI 37 36 kg/m²      Physical Exam  Vitals and nursing note reviewed  Constitutional:       Appearance: Normal appearance  She is obese  HENT:      Head: Normocephalic and atraumatic  Eyes:      Extraocular Movements: Extraocular movements intact  Pupils: Pupils are equal, round, and reactive to light  Cardiovascular:      Rate and Rhythm: Normal rate and regular rhythm  Pulmonary:      Effort: Pulmonary effort is normal       Breath sounds: Normal breath sounds  Abdominal:      General: Bowel sounds are normal       Tenderness: There is no abdominal tenderness  Musculoskeletal:         General: Normal range of motion  Cervical back: Normal range of motion  Skin:     General: Skin is warm and dry  Neurological:      General: No focal deficit present  Mental Status: She is alert and oriented to person, place, and time     Psychiatric:         Mood and Affect: Mood normal

## 2023-03-17 NOTE — PATIENT INSTRUCTIONS
Follow-up as scheduled after UGI  We kindly ask that your arrive 15 minutes before your scheduled appointment time with your provider to allow our staff to room you, get your vital signs and update your chart  Get lab work done  Please call the office if you need a script  It is recommended to check with your insurance BEFORE getting labs done to make sure they are covered by your policy  Call our office if you have any problems with abdominal pain especially associated with fever, chills, nausea, vomiting or any other concerns  All  Post-bariatric surgery patients should be aware that very small quantities of any alcohol can cause impairment and it is very possible not to feel the effect  The effect can be in the system for several hours  It is also a stomach irritant  It is advised to AVOID alcohol, Nonsteroidal antiinflammatory drugs (NSAIDS) and nicotine of all forms   Any of these can cause stomach irritation/pain  Discussed the effects of alcohol on a bariatric patient and the increased impairment risk  Keep up the good work!

## 2023-03-27 ENCOUNTER — TELEPHONE (OUTPATIENT)
Dept: BARIATRICS | Facility: CLINIC | Age: 38
End: 2023-03-27

## 2023-03-27 ENCOUNTER — HOSPITAL ENCOUNTER (OUTPATIENT)
Dept: RADIOLOGY | Facility: HOSPITAL | Age: 38
Discharge: HOME | End: 2023-03-27

## 2023-03-27 DIAGNOSIS — Z98.84 BARIATRIC SURGERY STATUS: ICD-10-CM

## 2023-03-27 DIAGNOSIS — K91.2 POSTSURGICAL MALABSORPTION: ICD-10-CM

## 2023-03-27 DIAGNOSIS — E66.9 OBESITY, CLASS II, BMI 35-39.9: ICD-10-CM

## 2023-03-27 DIAGNOSIS — Z48.815 ENCOUNTER FOR SURGICAL AFTERCARE FOLLOWING SURGERY OF DIGESTIVE SYSTEM: ICD-10-CM

## 2023-03-27 NOTE — TELEPHONE ENCOUNTER
Called pt to review her GI result pt understood what was communicated and advised  Pt was transferred to [facility] an appt         ----- Message from Elaine Pederson PA-C sent at 3/27/2023  9:55 AM EDT -----  Please let patient know her upper gi shows possible mild growth of her sleeve  Follow up as scheduled with Dr Christopher Ornelas

## 2023-04-07 ENCOUNTER — OFFICE VISIT (OUTPATIENT)
Dept: BARIATRICS | Facility: CLINIC | Age: 38
End: 2023-04-07

## 2023-04-07 VITALS
HEIGHT: 65 IN | HEART RATE: 75 BPM | TEMPERATURE: 98.7 F | DIASTOLIC BLOOD PRESSURE: 74 MMHG | WEIGHT: 229 LBS | SYSTOLIC BLOOD PRESSURE: 120 MMHG | BODY MASS INDEX: 38.15 KG/M2

## 2023-04-07 DIAGNOSIS — R63.5 ABNORMAL WEIGHT GAIN: ICD-10-CM

## 2023-04-07 DIAGNOSIS — K91.2 POSTSURGICAL MALABSORPTION: ICD-10-CM

## 2023-04-07 DIAGNOSIS — E66.9 OBESITY, CLASS II, BMI 35-39.9: Primary | ICD-10-CM

## 2023-04-07 DIAGNOSIS — Z98.84 STATUS POST LAPAROSCOPIC SLEEVE GASTRECTOMY: ICD-10-CM

## 2023-04-07 PROBLEM — E66.812 OBESITY, CLASS II, BMI 35-39.9: Status: ACTIVE | Noted: 2023-04-07

## 2023-04-07 NOTE — PROGRESS NOTES
OFFICE VISIT - BARIATRIC SURGERY  Pedro Kayser 41 y o  female MRN: [id number]  Unit/Bed#:  Encounter: [id number]      HPI:  Pedro Kayser is a 41 y o  female status post lap Sleeve Gastrectomy with Dr Danny Brito in 2017 at [facility]  Presents to the office today to follow up recent UGI study  Subjective     Her highest weight prior to surgery was around 235 lbs and lowest was 187 lbs about 1 5 years postop  Denies any postop complications    She reports started regaining weight gradually around 2018 despite exercise  In 2020 she started gaining more significant amount of weight  Overall states she eats a healthy diet  Biggest concern is weight regain and now she is snoring more as well  She would be interested in revision is appropriate  She states initially the surgeon had recommended bypass but patient ultimately chose the sleeve  Tolerating diet: yes  Main symptoms: weight regain, and sleep apnea  Worse with food: ***  Nausea: ***  Vomiting: ***  Diarrhea: ***  Duration of symptoms: ***  Smoking: ***  Alcohol use: ***  NSAID use: ***  Caffeine use: ***  Current treatment: ***  Past medical history significant for: ***  Previous cholecystectomy or pertinent abdominal surgeries: ***  Previous EGD: ***  Previous Upper GI: ***   Previous Manometry: ***  Ambulation is not impaired       Review of Systems  Denies fevers, chills, n/v/d, chest pain, sob, headaches, blurred vision, dizziness***    Historical Information   Past Medical History:   Diagnosis Date   • Abnormal Pap smear of cervix    • Anemia    • History of sleep apnea      Past Surgical History:   Procedure Laterality Date   • APPENDECTOMY     • BARIATRIC SURGERY     • AL COLPOSCOPY CERVIX VAG LOOP ELTRD BX CERVIX N/A 9/15/2020    Procedure: BIOPSY LEEP CERVIX;  Surgeon: Trish Burr MD;  [facility];  Service: Gynecology   • AL LAPAROSCOPY W/RMVL ADNEXAL STRUCTURES Bilateral 9/15/2020    Procedure: SALPINGECTOMY, "LAPAROSCOPIC;  Surgeon: Corinne Ortiz MD;  [facility];  Service: Gynecology   • DC REMOVAL INTRAUTERINE DEVICE IUD N/A 9/15/2020    Procedure: REMOVAL OF INTRAUTERINE DEVICE (IUD); Surgeon: Corinne Ortiz MD;  [facility];  Service: Gynecology     Social History   Social History     Substance and Sexual Activity   Alcohol Use Not Currently     Social History     Substance and Sexual Activity   Drug Use Never     Social History     Tobacco Use   Smoking Status Never   Smokeless Tobacco Never       Objective       Current Vitals:   Blood Pressure: 120/74 (04/07/23 0847)  Pulse: 75 (04/07/23 0847)  Temperature: 98 7 °F (37 1 °C) (04/07/23 0847)  Temp Source: Tympanic (04/07/23 0847)  Height: 5' 5\" (165 1 cm) (04/07/23 0847)  Weight - Scale: 104 kg (229 lb) (04/07/23 0847)    Invasive Devices     None                 Physical Exam:  Awake, alert, oriented, no acute distress  Normal work of breathing  Regular rate  Abd soft, obese, non tender, non distended, ***scars well healed  Moves all extremities  Normal affect  No gross neuro deficits      Pathology, and Other Studies: {Results Review Statement:29253}      Assessment:    Pricila Rangel is a 41 y o  female status post *** on *** with Dr Kristian Grigsby Workup thus far reviewed and discussed with patient: *** for abnormal findings  Workup includes:     UGI    FINDINGS:     The esophagus is normal in caliber  Esophageal motility is normal and emptying of contrast from the esophagus is prompt  No mucosal abnormalities with single contrast technique      Narrowed overall gastric caliber compatible with gastric sleeve procedure  Possible mild reexpansion of gastric volume but this is uncertain without postoperative comparison imaging  Otherwise unremarkable stomach      Contrast empties promptly into the duodenum  The duodenum is normal in caliber    The ligament of Treitz/duodenojejunal junction lies in a normal position      Gastroesophageal reflux was " not observed        There is no hiatal hernia         IMPRESSION:     1   Status post gastric sleeve procedure  Possible mild reexpansion of gastric volume but this is uncertain without any postoperative comparison imaging  EGD    n/a    Pathology from EGD   n/a    PLAN:  --------------------------------------------------------------------    We will refer for medical clearance preoperatively  Follow up in ***                Salazar Duenas MD  Bariatric Surgery Fellow  4/7/2023  9:03 AM

## 2023-04-07 NOTE — PROGRESS NOTES
"INITIAL VISIT - BARIATRIC SURGERY  Krunal Gray 41 y o  female MRN: [id number]  Unit/Bed#:  Encounter: [id number]      HPI:  Krunal Gray is a 41 y o  female who presents to the office status post laparoscopic sleeve gastrectomy at an outside hospital   Here to review the results of her UGI      Review of Systems   All other systems reviewed and are negative  Historical Information   Past Medical History:   Diagnosis Date   • Abnormal Pap smear of cervix    • Anemia    • History of sleep apnea      Past Surgical History:   Procedure Laterality Date   • APPENDECTOMY     • BARIATRIC SURGERY     • CA COLPOSCOPY CERVIX VAG LOOP ELTRD BX CERVIX N/A 9/15/2020    Procedure: BIOPSY LEEP CERVIX;  Surgeon: Yakov Lr MD;  [facility];  Service: Gynecology   • CA LAPAROSCOPY W/RMVL ADNEXAL STRUCTURES Bilateral 9/15/2020    Procedure: SALPINGECTOMY, LAPAROSCOPIC;  Surgeon: Yakov Lr MD;  [facility];  Service: Gynecology   • CA REMOVAL INTRAUTERINE DEVICE IUD N/A 9/15/2020    Procedure: REMOVAL OF INTRAUTERINE DEVICE (IUD); Surgeon: Yakov Lr MD;  [facility];  Service: Gynecology     Social History   Social History     Substance and Sexual Activity   Alcohol Use Not Currently     Social History     Substance and Sexual Activity   Drug Use Never     Social History     Tobacco Use   Smoking Status Never   Smokeless Tobacco Never     Family History: non-contributory    Meds/Allergies   all medications and allergies reviewed  No Known Allergies    Objective       Current Vitals:   Blood Pressure: 120/74 (04/07/23 0847)  Pulse: 75 (04/07/23 0847)  Temperature: 98 7 °F (37 1 °C) (04/07/23 0847)  Temp Source: Tympanic (04/07/23 0847)  Height: 5' 5\" (165 1 cm) (04/07/23 0847)  Weight - Scale: 104 kg (229 lb) (04/07/23 0847)        Invasive Devices     None                 Physical Exam  Vitals reviewed  Constitutional:       General: She is not in acute distress       Appearance: She " is well-developed  She is not diaphoretic  HENT:      Head: Normocephalic and atraumatic  Right Ear: External ear normal       Left Ear: External ear normal       Nose: Nose normal    Eyes:      General: No scleral icterus  Right eye: No discharge  Left eye: No discharge  Conjunctiva/sclera: Conjunctivae normal    Neurological:      Mental Status: She is alert and oriented to person, place, and time  Psychiatric:         Behavior: Behavior normal          Thought Content: Thought content normal          Judgment: Judgment normal          Lab Results: I have personally reviewed pertinent lab results  Imaging: I have personally reviewed pertinent reports and reviewed the images myself  Upper GI done last month revealed  FINDINGS:     The esophagus is normal in caliber  Esophageal motility is normal and emptying of contrast from the esophagus is prompt  No mucosal abnormalities with single contrast technique      Narrowed overall gastric caliber compatible with gastric sleeve procedure  Possible mild reexpansion of gastric volume but this is uncertain without postoperative comparison imaging  Otherwise unremarkable stomach      Contrast empties promptly into the duodenum  The duodenum is normal in caliber  The ligament of Treitz/duodenojejunal junction lies in a normal position      Gastroesophageal reflux was not observed        There is no hiatal hernia  Assessment/PLAN:    41 y o  female status -s/p Vertical Sleeve Gastrectomy with Dr Ga Butt I 2017 at [facility]) in [place]  Presents to the office today for annual as transfer patient     Her highest weight prior to surgery was around 235 lbs and lowest was 187 lbs about 1 5 years postop  Denies any postop complications  She reports started regaining weight gradually around 2018 despite exercise  In 2020 she started gaining more significant amount of weight   Overall states she eats a healthy diet       She would be interested in revision is appropriate  She states initially the surgeon had recommended bypass but patient ultimately chose the sleeve  I have reviewed myself the upper GI images that were obtained and the study revealed Status post gastric sleeve procedure  Possible mild reexpansion of gastric volume but this is uncertain without any postoperative comparison imaging  I am scheduling her to have an endoscopy done to further evaluate the anatomy of her bariatric operation  She will follow up with us as scheduled after the study is done      Елена Dowling MD  4/7/2023  9:05 AM

## 2023-10-18 ENCOUNTER — PREP FOR PROCEDURE (OUTPATIENT)
Dept: BARIATRICS | Facility: CLINIC | Age: 38
End: 2023-10-18

## 2023-10-18 DIAGNOSIS — Z98.84 BARIATRIC SURGERY STATUS: Primary | ICD-10-CM

## 2023-10-24 RX ORDER — SODIUM CHLORIDE 9 MG/ML
125 INJECTION, SOLUTION INTRAVENOUS CONTINUOUS
Status: CANCELLED | OUTPATIENT
Start: 2023-10-24

## 2023-10-25 ENCOUNTER — ANESTHESIA (OUTPATIENT)
Dept: GASTROENTEROLOGY | Facility: HOSPITAL | Age: 38
End: 2023-10-25

## 2023-10-25 ENCOUNTER — HOSPITAL ENCOUNTER (OUTPATIENT)
Dept: GASTROENTEROLOGY | Facility: HOSPITAL | Age: 38
Setting detail: OUTPATIENT SURGERY
Discharge: HOME | End: 2023-10-25
Attending: SURGERY
Payer: COMMERCIAL

## 2023-10-25 ENCOUNTER — ANESTHESIA EVENT (OUTPATIENT)
Dept: GASTROENTEROLOGY | Facility: HOSPITAL | Age: 38
End: 2023-10-25

## 2023-10-25 VITALS
SYSTOLIC BLOOD PRESSURE: 110 MMHG | DIASTOLIC BLOOD PRESSURE: 64 MMHG | HEART RATE: 81 BPM | HEIGHT: 65 IN | BODY MASS INDEX: 37.49 KG/M2 | TEMPERATURE: 97.7 F | RESPIRATION RATE: 16 BRPM | OXYGEN SATURATION: 96 % | WEIGHT: 225 LBS

## 2023-10-25 DIAGNOSIS — Z98.84 BARIATRIC SURGERY STATUS: ICD-10-CM

## 2023-10-25 LAB
EXT PREGNANCY TEST URINE: NEGATIVE
EXT. CONTROL: NORMAL

## 2023-10-25 PROCEDURE — 43239 EGD BIOPSY SINGLE/MULTIPLE: CPT | Performed by: SURGERY

## 2023-10-25 PROCEDURE — 88305 TISSUE EXAM BY PATHOLOGIST: CPT | Performed by: PATHOLOGY

## 2023-10-25 PROCEDURE — 81025 URINE PREGNANCY TEST: CPT | Performed by: ANESTHESIOLOGY

## 2023-10-25 RX ORDER — SODIUM CHLORIDE 9 MG/ML
125 INJECTION, SOLUTION INTRAVENOUS CONTINUOUS
Status: DISCONTINUED | OUTPATIENT
Start: 2023-10-25 | End: 2023-10-29 | Stop reason: HOSPADM

## 2023-10-25 RX ORDER — LIDOCAINE HYDROCHLORIDE 20 MG/ML
INJECTION, SOLUTION EPIDURAL; INFILTRATION; INTRACAUDAL; PERINEURAL AS NEEDED
Status: DISCONTINUED | OUTPATIENT
Start: 2023-10-25 | End: 2023-10-25

## 2023-10-25 RX ORDER — PROPOFOL 10 MG/ML
INJECTION, EMULSION INTRAVENOUS AS NEEDED
Status: DISCONTINUED | OUTPATIENT
Start: 2023-10-25 | End: 2023-10-25

## 2023-10-25 RX ADMIN — DEXMEDETOMIDINE 8 MCG: 100 INJECTION, SOLUTION, CONCENTRATE INTRAVENOUS at 09:55

## 2023-10-25 RX ADMIN — PROPOFOL 50 MG: 10 INJECTION, EMULSION INTRAVENOUS at 10:00

## 2023-10-25 RX ADMIN — LIDOCAINE HYDROCHLORIDE 80 MG: 20 INJECTION, SOLUTION EPIDURAL; INFILTRATION; INTRACAUDAL at 09:55

## 2023-10-25 RX ADMIN — DEXMEDETOMIDINE 12 MCG: 100 INJECTION, SOLUTION, CONCENTRATE INTRAVENOUS at 09:57

## 2023-10-25 RX ADMIN — PROPOFOL 50 MG: 10 INJECTION, EMULSION INTRAVENOUS at 09:59

## 2023-10-25 RX ADMIN — SODIUM CHLORIDE 125 ML/HR: 0.9 INJECTION, SOLUTION INTRAVENOUS at 09:21

## 2023-10-25 RX ADMIN — PROPOFOL 100 MG: 10 INJECTION, EMULSION INTRAVENOUS at 09:57

## 2023-10-25 RX ADMIN — PROPOFOL 150 MG: 10 INJECTION, EMULSION INTRAVENOUS at 09:55

## 2023-10-25 NOTE — H&P
This is a 41 y.o. female s/p Vertical Sleeve Gastrectomy with Dr Lucila Gomez I 2017 at [facility]) in [facility]. Presents to the office today for annual as transfer patient     Her highest weight prior to surgery was around 235 lbs and lowest was 187 lbs about 1.5 years postop. Denies any postop complications. She reports started regaining weight gradually around 2018 despite exercise. In 2020 she started gaining more significant amount of weight. Overall states she eats a healthy diet. She would be interested in revision is appropriate. She states initially the surgeon had recommended bypass but patient ultimately chose the sleeve. I have reviewed myself the upper GI images that were obtained and the study revealed Status post gastric sleeve procedure. Possible mild reexpansion of gastric volume but this is uncertain without any postoperative comparison imaging. I am scheduling her to have an endoscopy done to further evaluate the anatomy of her bariatric operation    Physical Exam    /87   Pulse 59   Temp 97.7 °F (36.5 °C) (Oral)   Resp 16   Ht 5' 5" (1.651 m)   Wt 102 kg (225 lb)   LMP 10/16/2023   SpO2 99%   BMI 37.44 kg/m²    AAOx3  RRR  CTA B  Abdomen obese. Benign. A/P:    This is a 41 y.o. female with a history of morbid obesity and Body mass index is 37.44 kg/m². Skip Pickering     She had sleeve gastrectomy in 2017 and she is interested in potential revision for weight regain      Luz Guardado MD  10/25/23  9:48 AM

## 2023-10-25 NOTE — ANESTHESIA POSTPROCEDURE EVALUATION
Post-Op Assessment Note    CV Status:  Stable    Pain management: adequate     Mental Status:  Awake   Hydration Status:  Stable   PONV Controlled:  Controlled   Airway Patency:  Patent      Post Op Vitals Reviewed: Yes      Staff: Anesthesiologist         No notable events documented.     BP      Temp      Pulse     Resp      SpO2      /64   Pulse 81   Temp 97.7 °F (36.5 °C) (Oral)   Resp 16   Ht 5' 5" (1.651 m)   Wt 102 kg (225 lb)   LMP 10/16/2023   SpO2 96%   BMI 37.44 kg/m²

## 2023-10-25 NOTE — ANESTHESIA PREPROCEDURE EVALUATION
Procedure:  EGD    Relevant Problems   ANESTHESIA   (+) PONV (postoperative nausea and vomiting)        Physical Exam    Airway    Mallampati score: II  TM Distance: >3 FB  Neck ROM: full     Dental   No notable dental hx     Cardiovascular  Rhythm: regular, Rate: normal, Cardiovascular exam normal    Pulmonary  Pulmonary exam normal Breath sounds clear to auscultation    Other Findings      Anesthesia Plan  ASA Score- 2     Anesthesia Type- IV sedation with anesthesia with ASA Monitors. Additional Monitors:     Airway Plan:            Plan Factors-    Chart reviewed. Existing labs reviewed. Patient summary reviewed. Patient is not a current smoker. Patient not instructed to abstain from smoking on day of procedure. Patient did not smoke on day of surgery. Induction- intravenous. Postoperative Plan-     Informed Consent- Anesthetic plan and risks discussed with patient and spouse [facility]). I personally reviewed this patient with the CRNA. Discussed and agreed on the Anesthesia Plan with the CRNA. Juju Zelaya

## 2023-10-27 PROCEDURE — 88305 TISSUE EXAM BY PATHOLOGIST: CPT | Performed by: PATHOLOGY

## 2023-11-08 ENCOUNTER — OFFICE VISIT (OUTPATIENT)
Dept: BARIATRICS | Facility: CLINIC | Age: 38
End: 2023-11-08
Payer: COMMERCIAL

## 2023-11-08 VITALS
SYSTOLIC BLOOD PRESSURE: 122 MMHG | TEMPERATURE: 97.3 F | HEART RATE: 68 BPM | WEIGHT: 226 LBS | DIASTOLIC BLOOD PRESSURE: 84 MMHG | BODY MASS INDEX: 37.65 KG/M2 | HEIGHT: 65 IN

## 2023-11-08 DIAGNOSIS — R63.5 ABNORMAL WEIGHT GAIN: ICD-10-CM

## 2023-11-08 DIAGNOSIS — Z98.84 BARIATRIC SURGERY STATUS: Primary | ICD-10-CM

## 2023-11-08 PROCEDURE — 99213 OFFICE O/P EST LOW 20 MIN: CPT | Performed by: SURGERY

## 2023-11-08 NOTE — PROGRESS NOTES
OFFICE VISIT - BARIATRIC SURGERY  Alma Ding 41 y.o. female MRN: [id number]  Unit/Bed#:  Encounter: [id number]      HPI:  Alma Ding is a 41 y.o. female status post laparoscopic sleeve gastrectomy by Dr. Isabela Arenas in 2017. Comes to the office today to establish care as she has had additional weight gain and is interested in options to lose weight. She underwent an EGD and is here for review    Subjective   Patient was initially planning to undergo a gastric bypass in [facility] but eventually had a sleeve gastrectomy. She lost about 50lbs following her surgery but has regained most of her weight back. She is now interested in additional options for weight loss. She denies any heartburn though very infrequently takes TUMs. Denies smoking or NSAID use  Is a coffee drinker    Review of Systems   All other systems reviewed and are negative. Historical Information   Past Medical History:   Diagnosis Date    Abnormal Pap smear of cervix     Anemia     History of sleep apnea      Past Surgical History:   Procedure Laterality Date    APPENDECTOMY      BARIATRIC SURGERY      GA COLPOSCOPY CERVIX VAG LOOP ELTRD BX CERVIX N/A 9/15/2020    Procedure: BIOPSY LEEP CERVIX;  Surgeon: Gita Yeboah MD;  [facility];  Service: Gynecology    GA LAPAROSCOPY W/RMVL ADNEXAL STRUCTURES Bilateral 9/15/2020    Procedure: SALPINGECTOMY, LAPAROSCOPIC;  Surgeon: Gita Yeboah MD;  [facility];  Service: Gynecology    GA REMOVAL INTRAUTERINE DEVICE IUD N/A 9/15/2020    Procedure: REMOVAL OF INTRAUTERINE DEVICE (IUD);   Surgeon: Gita Yeboah MD;  [facility];  Service: Gynecology     Social History   Social History     Substance and Sexual Activity   Alcohol Use Not Currently     Social History     Substance and Sexual Activity   Drug Use Never     Social History     Tobacco Use   Smoking Status Never   Smokeless Tobacco Never       Objective       Current Vitals:   Blood Pressure: 122/84 (11/08/23 1053)  Pulse: 68 (11/08/23 1053)  Temperature: (!) 97.3 °F (36.3 °C) (11/08/23 1053)  Temp Source: Tympanic (11/08/23 1053)  Height: 5' 5" (165.1 cm) (11/08/23 1053)  Weight - Scale: 103 kg (226 lb) (11/08/23 1053)    Invasive Devices       None                   Physical Exam  Constitutional:       Appearance: Normal appearance. HENT:      Head: Normocephalic and atraumatic. Cardiovascular:      Rate and Rhythm: Normal rate. Pulses: Normal pulses. Pulmonary:      Effort: Pulmonary effort is normal.   Abdominal:      Palpations: Abdomen is soft. Skin:     General: Skin is warm. Neurological:      General: No focal deficit present. Mental Status: She is alert and oriented to person, place, and time. Pathology, and Other Studies: I have personally reviewed pertinent reports. Assessment/PLAN:    Letty Kim is a 41 y.o. female status post laparoscopic sleeve gastrectomy by Dr. Elmo Zavala in 2017. Patient interested in additional options for weight loss. UGI  UPPER GI SERIES  SINGLE CONTRAST     INDICATION:  Z48.815: Encounter for surgical aftercare following surgery on the digestive system  Z98.84: Bariatric surgery status  K91.2: Postsurgical malabsorption, not elsewhere classified  E66.9: Obesity, unspecified. COMPARISON:  None     IMAGES:  43     FLUOROSCOPY TIME:  1.7 minutes     TECHNIQUE:  The patient was given barium by mouth and images of the esophagus, stomach, and small bowel were obtained. FINDINGS:     The esophagus is normal in caliber. Esophageal motility is normal and emptying of contrast from the esophagus is prompt. No mucosal abnormalities with single contrast technique. Narrowed overall gastric caliber compatible with gastric sleeve procedure. Possible mild reexpansion of gastric volume but this is uncertain without postoperative comparison imaging. Otherwise unremarkable stomach. Contrast empties promptly into the duodenum.   The duodenum is normal in caliber. The ligament of Treitz/duodenojejunal junction lies in a normal position. Gastroesophageal reflux was not observed. There is no hiatal hernia. IMPRESSION:     1. Status post gastric sleeve procedure. Possible mild reexpansion of gastric volume but this is uncertain without any postoperative comparison imaging. Workstation performed: [id number]      EGD  DATE OF SERVICE:  10/25/23     PHYSICIAN(S):  Attending:   Bennie John MD      Fellow:   No Staff Documented         INDICATION:  Bariatric surgery status     POST-OP DIAGNOSIS:  See the impression below. PREPROCEDURE:  Informed consent was obtained for the procedure, including sedation. Risks of perforation, hemorrhage, adverse drug reaction and aspiration were discussed. The patient was placed in the left lateral decubitus position. Patient was explained about the risks and benefits of the procedure. Risks including but not limited to bleeding, infection, and perforation were explained in detail. Also explained about less than 100% sensitivity with the exam and other alternatives. PROCEDURE: EGD     DETAILS OF PROCEDURE:   Patient was taken to the procedure room where a time out was performed to confirm correct patient and correct procedure. The patient underwent monitored anesthesia care, which was administered by an anesthesia professional. The patient's blood pressure, heart rate, level of consciousness, respirations and oxygen were monitored throughout the procedure. The scope was advanced to the second part of the duodenum. Prior to the procedure, the patient's H. Pylori status was unknown. The patient experienced no blood loss. The procedure was not difficult. The patient tolerated the procedure well. There were no apparent adverse events. This is a 41 y.o. female s/p Vertical Sleeve Gastrectomy with Dr Shiraz Snider I 2017 at [facility].  Presents to the office today for annual as transfer patient     Her highest weight prior to surgery was around 235 lbs and lowest was 187 lbs about 1.5 years postop. Denies any postop complications. She reports started regaining weight gradually around 2018 despite exercise. In 2020 she started gaining more significant amount of weight. Overall states she eats a healthy diet. She would be interested in revision is appropriate. She states initially the surgeon had recommended bypass but patient ultimately chose the sleeve. I have reviewed myself the upper GI images that were obtained and the study revealed Status post gastric sleeve procedure. Possible mild reexpansion of gastric volume but this is uncertain without any postoperative comparison imaging. I am scheduling her to have an endoscopy done to further evaluate the anatomy of her bariatric operation. ANESTHESIA INFORMATION:  ASA: II  Anesthesia Type: IV Sedation with Anesthesia     MEDICATIONS:  sodium chloride 0.9 % infusion 300 mL*               *From user-documented volume   (Totals for administrations occurring from 0953 to 1001 on 10/25/23)         FINDINGS:  Mild, localized erythematous mucosa in the GE junction. Several erosions limited to the mucosal folds and no larger than 5 mm in extent. Regular Z-line 35 cm from the incisors  Mild, generalized erythematous mucosa in the stomach; performed 6 cold forceps biopsies to rule out H. pylori        SPECIMENS:  ID Type Source Tests Collected by Time Destination   1 : biopsy retrieved by cold biopsy forceps, R/O H. pylori Tissue Stomach TISSUE EXAM Fadumo Watkins MD 10/25/2023 7820              IMPRESSION:  Esophagitis LA grade A  Sleeve pouch gastritis with mild erythematous mucosa. Slightly prominent upper portion of the sleeve. RECOMMENDATION:  Continue with the bariatric program process and follow up in the office. Biopsy results pending. Pathology from EGD   A.  Stomach, biopsy:  -Gastric antral mucosa with reactive/chemical gastropathy.  -Gastric oxyntic mucosa with no significant histopathologic change.  -Negative for Helicobacter pylori organisms on H&E stain.       MANOMETRY/pH Study        GASTRIC EMPTYING STUDY      --------------------------------------------------------------------    Discussed with patient regarding 6 months referral to medical weight loss and reassessment to see if she would qualify for a revision  Level 2 revisional pathway          Julio Zimmerman MD  Bariatric Surgery  11/8/2023  11:05 AM

## 2023-11-15 ENCOUNTER — OFFICE VISIT (OUTPATIENT)
Dept: BARIATRICS | Facility: CLINIC | Age: 38
End: 2023-11-15

## 2023-11-15 VITALS — BODY MASS INDEX: 37.94 KG/M2 | WEIGHT: 228 LBS

## 2023-11-15 DIAGNOSIS — E66.9 OBESITY, CLASS II, BMI 35-39.9: ICD-10-CM

## 2023-11-15 DIAGNOSIS — E66.9 OBESITY, CLASS II, BMI 35-39.9: Primary | ICD-10-CM

## 2023-11-15 DIAGNOSIS — Z98.84 STATUS POST LAPAROSCOPIC SLEEVE GASTRECTOMY: ICD-10-CM

## 2023-11-15 DIAGNOSIS — R63.5 ABNORMAL WEIGHT GAIN: ICD-10-CM

## 2023-11-15 DIAGNOSIS — K91.2 POSTSURGICAL MALABSORPTION: Primary | ICD-10-CM

## 2023-11-15 PROCEDURE — RECHECK: Performed by: DIETITIAN, REGISTERED

## 2023-11-15 NOTE — PROGRESS NOTES
Bariatric Nutrition Assessment Note  "Patient was initially planning to undergo a gastric bypass in [facility] but eventually had a sleeve gastrectomy. She lost about 50lbs following her surgery but has regained most of her weight back. She is now interested in additional options for weight loss. Patient was initially planning to undergo a gastric bypass in [facility] but eventually had a sleeve gastrectomy. She lost about 50lbs following her surgery but has regained most of her weight back. She is now interested in additional options for weight loss.   Discussed with patient regarding 6 months referral to medical weight loss and reassessment to see if she would qualify for a revision  Level 2 revisional pathway"    Type of surgery    Vertical sleeve gastrectomy with Dr Anil Figueroa at [facility] in [facility]  Surgery Date: 7/31/2017  6 years post-op  Surgeon: Dr. Susie Cárdenas  41 y.o.  female   Wt 103 kg (228 lb)   LMP 10/16/2023   BMI 37.94 kg/m²     Crockettrosa Estrella Equation:    UIP=4823grkm/day  Weight Maintenance= 2072kcal/day  Estimated calories for weight loss 1072-1572kcal/day (1-2# per wk wt loss - sedentary)  Estimated protein needs 68.3-102.4g/day (1.0-1.5 gms/kg IBW)  Estimated fluid needs 2049-2391ml/day (30-35 ml/kg IBW)    Weight on Day of Weight Loss Surgery: 235lbs  Weight in (lb) to have BMI = 25: 150.25lbs  Pre-Op Excess Wt: 84.75lbs  Post-op thien= 181lbs  47lb weight regain  Post-Op Wt Loss: 7#/ 8.3% EBWL in 4 year(s)    Weight History   Onset of Obesity: Childhood  Family history of obesity: Yes  Wt Loss Attempts: Counseling with  MD  Exercise  FAD Diets (Cabbage soup, Grapefruit, Cleanse, etc.)  Meal Replacements (Medifast, Slim Fast, etc.)  Nutrition Counseling with RD  OTC meds/supplements  Self Created Diets (Portion Control, Healthy Food Choices, etc.)  Patient has tried the above for 6 months or more with insufficient weight loss or weight regain, which is why patient has requested to be evaluated for weight loss surgery today  Maximum Wt Lost:  highest weight prior to surgery was around 235 lbs and lowest was 181 lbs about 1.5 years postop. She reports started regaining weight gradually around 2018 despite exercise. In 2020 she started gaining more significant amount of weight. Review of History and Medications   Past Medical History:   Diagnosis Date    Abnormal Pap smear of cervix     Anemia     History of sleep apnea      Past Surgical History:   Procedure Laterality Date    APPENDECTOMY      BARIATRIC SURGERY      VT COLPOSCOPY CERVIX VAG LOOP ELTRD BX CERVIX N/A 9/15/2020    Procedure: BIOPSY LEEP CERVIX;  Surgeon: Eduard Olguin MD;  [facility];  Service: Gynecology    VT LAPAROSCOPY W/RMVL ADNEXAL STRUCTURES Bilateral 9/15/2020    Procedure: SALPINGECTOMY, LAPAROSCOPIC;  Surgeon: Eduard Olguin MD;  [facility];  Service: Gynecology    VT REMOVAL INTRAUTERINE DEVICE IUD N/A 9/15/2020    Procedure: REMOVAL OF INTRAUTERINE DEVICE (IUD);   Surgeon: Eduard Olguin MD;  [facility];  Service: Gynecology     Social History     Socioeconomic History    Marital status: /Civil Union     Spouse name: Not on file    Number of children: 3    Years of education: Not on file    Highest education level: Some college, no degree   Occupational History    Occupation: [employer]    Tobacco Use    Smoking status: Never    Smokeless tobacco: Never   Vaping Use    Vaping Use: Never used   Substance and Sexual Activity    Alcohol use: Not Currently    Drug use: Never    Sexual activity: Yes     Partners: Male     Birth control/protection: Female Sterilization   Other Topics Concern    Not on file   Social History Narrative    Not on file     Social Determinants of Health     Financial Resource Strain: High Risk (1/25/2023)    Overall Financial Resource Strain (CARDIA)     Difficulty of Paying Living Expenses: Very hard   Food Insecurity: Food Insecurity Present (1/25/2023)    Hunger Vital Sign     Worried About Running Out of Food in the Last Year: Sometimes true     Ran Out of Food in the Last Year: Often true   Transportation Needs: Unmet Transportation Needs (1/25/2023)    PRAPARE - Transportation     Lack of Transportation (Medical): Yes     Lack of Transportation (Non-Medical): Patient refused   Physical Activity: Sufficiently Active (6/11/2021)    Exercise Vital Sign     Days of Exercise per Week: 4 days     Minutes of Exercise per Session: 60 min   Stress: No Stress Concern Present (6/11/2021)    [facility]     Feeling of Stress : Not at all   Social Connections:  Moderately Isolated (6/11/2021)    Social Connection and Isolation Panel [NHANES]     Frequency of Communication with Friends and Family: More than three times a week     Frequency of Social Gatherings with Friends and Family: Once a week     Attends Taoist Services: Never     Active Member of Clubs or Organizations: No     Attends Club or Organization Meetings: Never     Marital Status:    Intimate Partner Violence: Not At Risk (6/11/2021)    Humiliation, Afraid, Rape, and Kick questionnaire     Fear of Current or Ex-Partner: No     Emotionally Abused: No     Physically Abused: No     Sexually Abused: No   Housing Stability: Not on file       Current Outpatient Medications:     cyanocobalamin (VITAMIN B-12) 50 MCG tablet, Take 50 mcg by mouth, Disp: , Rfl:     ferrous sulfate 325 (65 Fe) mg tablet, Take 325 mg by mouth daily with breakfast, Disp: , Rfl:     Multiple Vitamin (multivitamin) capsule, Take 1 capsule by mouth daily, Disp: , Rfl:     VITAMIN D PO, Take by mouth, Disp: , Rfl:   Food Intake and Lifestyle Assessment   Food Intake Assessment completed via usual diet recall  Wakes 6:15am: 4oz coffee with 2 spoons sugar  Breakfast: 8:30am: sandwich: ham, cheese, saenz or cereal: honey bunches oats with 1% milk  Snack: usually not. Drinks water or juice. Lunch: 3pm: packs lunch: rice, meat: mostly chicken, sometimes beef or pork or shrimp, salad, plantains  Snack: 5pm: 4oz coffee with 2 spoons sugar, couple crackers  Dinner: mostly skips  Snack: none  Beverage intake: water, apple juice, and coffee  Protein supplement: none currently- doesn't like them  Estimated protein intake per day: 30-40g  Estimated fluid intake per day: three 16-oz connell, 8 oz coffee, 16oz juice  Meals eaten away from home: weekends: two per week: ash chicken sandwich, fries, soda or dominos  Typical meal pattern: 2 meals per day and 1 snacks per day  Eating Behaviors: Consumption of high calorie beverages and skips dinner  Food allergies or intolerances: No Known Allergies NKFA  Cultural or Jew considerations:     Physical Assessment  Physical Activity  Types of exercise: Some walking. Sedentary at home. Current physical limitations: back injury May 2022 back surgery December 2022. Did 3 months of PT    Psychosocial Assessment   Support systems: lives with spouse and children: 16yo, 11yo, 6yo  Socioeconomic factors: works PT in [place]. Spouse works for [employer]. Brother had WLS after she did   Friend had WLS last year    Nutrition Diagnosis  Diagnosis: Overweight / Obesity (NC-3.3), Excessive energy intake (NI-1.5), Inadequate protein intake (NI-5.7.3), and Altered GI function (NC-1.4)  Related to: Physical inactivity, Excessive energy intake, and Altered GI function  As Evidenced by: BMI >25, Expected anthropometric outcomes are not achieved, Excessive energy intake, and Unintentional weight gain     Nutrition Prescription: Recommend the following diet  Low fat, Low sugar, High protein, and Regular    Interventions and Teaching   Discussed pre-op and post-op nutrition guidelines. Patient educated and handouts provided.   Surgical changes to stomach / GI  Capacity of post-surgery stomach  Diet progression  Adequate hydration  Sugar and fat restriction to decrease "dumping syndrome"  Expected weight loss  Weight loss plateaus/ possibility of weight regain  Exercise  Nutrition considerations after surgery  Protein supplements  Meal planning and preparation  Appropriate carbohydrate, protein, and fat intake, and food/fluid choices to maximize safe weight loss, nutrient intake, and tolerance   Dietary and lifestyle changes  Possible problems with poor eating habits  Techniques for self monitoring and keeping daily food journal  Potential for food intolerance after surgery, and ways to deal with them including: lactose intolerance, nausea, reflux, vomiting, diarrhea, food intolerance, appetite changes, gas  Vitamin / Mineral supplementation of Multivitamin with minerals and Calcium  Currently taking:  Vitamin D 1000mg  Iron 65mg  Multivitamin- OTC gummy once daily  Stopped taking Vit B12    Patient is not currently pregnant and doesn't desire to become pregnant a minimum of one year post-op    Education provided to: patient    Barriers to learning: No barriers identified    Readiness to change: preparation    Prior research on procedure: discussed with provider, friends or family, and previous wt. loss surgery    Comprehension: verbalizes understanding     Expected Compliance: good    Recommendations  Pt is an appropriate candidate for surgery.  Yes    Evaluation / Monitoring  Dietitian to Monitor: Eating pattern as discussed Tolerance of nutrition prescription Body weight Lab values Physical activity Bowel pattern    Goals  Eliminate sugar sweetened beverages, Food journal, Exercise 30 minutes 5 times per week, Complete lession plans 1-6, and Eat 3 meals per day    Time Spent:   1 Hour

## 2023-11-15 NOTE — PROGRESS NOTES
Bariatric Behavioral Health Evaluation    Level 2 revision pathway. Presenting Problem:   .Roddy Ngo  is a 41 y.o.   female    :  1985   Gained weight after multiple child births. Was following with a dietitian and then was referred to bariatric program for her weight loss. Entered bariatric program for about six months. Vertical sleeve gastrectomy with Dr Richard Higgins at [facility] in [facility]    Surgery Date:     highest weight prior to surgery was around 235 lbs and lowest was 187 lbs about 1.5 years postop. She reports started regaining weight gradually around 2018 despite exercise. Surgeon: Dr. Vickie Noel     Is the patient seeking Bariatric Surgery? YES  Will follow [facility] for 6 months and revisit the concept of a revision      Realizes Post- Op Requirements? Yes, and will learn more meeting with the dietitians and social workers through the process     Pre-morbid level of function and history of present illness: 2022 back surgery, (slipped disc)  bed bound, moved from [facility] (walking) to [place] (driving)    Stressors and Supports:   is supportive of her goals. Stressor:  medical/pain     Living situation:  and three children in the home. ( 16, 13, 11). Home cooked meals in the home    moved to [place] from [facility]     Work: PT:  [employer]. Standing position, just started in September. 4 or 5 hrs shifts   Difficultly standing long periods of time    Physical Activity: history of going to the gym. Chronic pain will interrupt her routine. (History of Back surgery)    Family History (medical, traditions, culture, rules/routines around food):     Brother is post bariatric surgery. ([place])  No other weight concerns with immediate family. Mental Health, Trauma and Substance use Assessment    Psychiatric/Psychological Treatment Diagnosis:   nothing in chart. Stated she had positive emotions post bariatric surgery.   Coping skills for stress: sleep   laying down is more comfortable for her. History of Eating Disorder:  denies emotional eating. Does has history of eating fast.  Feels she can eat 1 1/2 cups of food at one time. May skip dinner. Fast food on weekends   Apple juice and reg soda for liquid calories. Soda on weekends. Apple juice daily. Outpatient Counselor No      Psychiatrist No     Have you had any Mental Health Higher level of care (ED, PHP or Inpatient ) Treatment? No    Drug and/or Alcohol use and treatment history: none noted    Have you had any Substance Use Treatment? No     Tobacco/Vaping History: denied any nicotine use in any form. Patient agrees to remain nicotine free post surgery. Risk Assessment    Identified support system intact. Risk of harm to self or others:  none noted during evaluation  No HI/SI      Presence of Audio/Visual Hallucinations: Not reported during evaluation     Access to weapons: Not reported during evaluation     Observation: this interview only (BEENA and RD will follow Alma Ding through the bariatric program)     Based on the previous information, the client presents the following risk of harm to self or others:  Low risk        Physical/Mental Health Status:              Appearance: appropriate           Sociability: average           Affect: appropriate           Mood: calm           Thought Process: coherent           Speech: normal           Content: no impairment           Orientation: person  Yes , place  Yes , time  Yes , normal attention span  Yes , normal memory  Yes   and normal judgement  Yes            Insight: emotional  good       BARIATRIC SURGERY EDUCATION CHECKLIST    Patient has received the following education related to the bariatric surgery process and understands:    Patients may be required to complete a psychiatric evaluation and receive clearance for surgery from mental health provider.     Patients who undergo weight loss surgery are at higher risk of increased mental health concerns and suicide attempts. Patients may be required to complete a full substance abuse evaluation and then complete all treatment recommendations prior to surgery. If diagnosis of abuse/dependence results, patient may be required to remain sober for one (1) year before having bariatric surgery. Patients on psychiatric medications should check with their provider to discuss psychiatric medications and the changes in absorption. Patient should discuss all time release medications with provider and take all medications as prescribed. The recommendation is that there is no use of any tobacco products, Hookah or vapes for the bariatric post-operation patient. Bariatric surgery patients should not consume alcohol as a post-operative patient as it may increase risk of numerous health conditions including but not limited to alcohol abuse and ulcers. There is a possibility of weight regain if patient does not follow all program guidelines and recommendations. Bariatric surgery patients should exercise thirty (30) to sixty (60) minutes per day to maintain post-surgical weight loss. Research indicates that bariatric patients are more successful when they see a therapist for up to two (2) years post-op. Patients will follow all medical and dietary recommendations provided. Patient will keep all scheduled appointments and follow up with their physician for a minimum of five (5) years. Patient will take all vitamins as recommended. Post-operative vitamins are life-long. There is a goal month set. All requirements should be met by this time. Don't wait to get started! There is a deadline month set. All requirements must be finished by this time and if not, the patient will be halted in the surgery process.  The patient can be referred to the medical weight management program or can come back to the surgical program once the unfinished tasks from the previous program are completed. Female patients of childbearing years are informed that pregnancy is not recommended until 12 - 18 months post-op. Recommendations: Recommended for surgery  yes  6 months referral to medical weight loss and reassessment to see if she would qualify for a revision  Level 2 revisional pathway      Social Service Note:  Patient presented for behavioral health evaluation for the bariatric program.   Negative for Mental Health. No reported history of therapy. No reported history of Psychiatry. No reported  Drug and/or Alcohol abuse or treatment. No reported tobacco use. Patient educated regarding the impact of nicotine and alcohol on the post surgery bariatric patient. Patient has a negative family history of tobacco and alcohol addiction. Patient has not reported contraindications for bariatric surgery. Patient will begin making changes with relationship with food through behavior modifications and mindful techniques. Tracking food intake for one week every three months can assist with weight maintenance and self accountability for post surgery success.  and Dietitian follow up visits are available for pre and post surgery support. Bariatric support group is available monthly. Patient verbalized the ability to start making changes and create a healthy relationship around nutrition. Patient meets criteria for surgery at this time and is referred to the bariatric surgeon.         Bladimir Hays LCSW

## 2023-12-12 NOTE — PROGRESS NOTES
1 / 6 level 2 revision pathway.    Vertical sleeve gastrectomy with Dr Alvarez at [facility] in [place]    Surgery Date: 2017     highest weight prior to surgery was around 235 lbs and lowest was 187 lbs about 1.5 years postop. She reports started regaining weight gradually around 2018 despite exercise.        Surgeon: Dr. Neel Clark  Today's weight:  229.3 #     Using the AP to track food.  Staying hydrated with water.  Still working 12 hrs a week at [employer].  Standing position.  Hurts her back if she takes longer shifts.  Following past PT routine to help with her back.    Some family gathering for the Dec Holidays.  Stated there will be food for the holidays.  Restriction comes and goes depending on the texture of the food.    Feels she is getting enough protein.  Does practice plating.  Walking the neighborhood and stretching (daily) for her movement.   Stopped going to the gym in July.

## 2023-12-18 ENCOUNTER — OFFICE VISIT (OUTPATIENT)
Dept: BARIATRICS | Facility: CLINIC | Age: 38
End: 2023-12-18

## 2023-12-18 VITALS — WEIGHT: 229.3 LBS | BODY MASS INDEX: 38.16 KG/M2

## 2023-12-18 DIAGNOSIS — Z98.84 STATUS POST LAPAROSCOPIC SLEEVE GASTRECTOMY: Primary | ICD-10-CM

## 2023-12-18 PROCEDURE — RECHECK

## 2024-05-22 ENCOUNTER — TELEPHONE (OUTPATIENT)
Age: 39
End: 2024-05-22

## 2024-05-22 NOTE — TELEPHONE ENCOUNTER
Patient calling and asking to get surgical process started again as she got her insurance back     Called office but was busy told patient she would receive a call back

## 2024-05-24 ENCOUNTER — OFFICE VISIT (OUTPATIENT)
Dept: BARIATRICS | Facility: CLINIC | Age: 39
End: 2024-05-24
Payer: COMMERCIAL

## 2024-05-24 VITALS
BODY MASS INDEX: 38.57 KG/M2 | DIASTOLIC BLOOD PRESSURE: 70 MMHG | SYSTOLIC BLOOD PRESSURE: 112 MMHG | OXYGEN SATURATION: 99 % | HEIGHT: 65 IN | HEART RATE: 77 BPM | WEIGHT: 231.5 LBS | TEMPERATURE: 96.6 F

## 2024-05-24 DIAGNOSIS — E66.9 OBESITY, CLASS II, BMI 35-39.9: Primary | ICD-10-CM

## 2024-05-24 DIAGNOSIS — R63.5 ABNORMAL WEIGHT GAIN: ICD-10-CM

## 2024-05-24 DIAGNOSIS — Z98.84 STATUS POST LAPAROSCOPIC SLEEVE GASTRECTOMY: ICD-10-CM

## 2024-05-24 DIAGNOSIS — K91.2 POSTSURGICAL MALABSORPTION: ICD-10-CM

## 2024-05-24 PROCEDURE — 99213 OFFICE O/P EST LOW 20 MIN: CPT | Performed by: SURGERY

## 2024-05-24 NOTE — PROGRESS NOTES
"  FOLLOW UP VISIT - BARIATRIC SURGERY  Steph Ceron 39 y.o. female MRN: [id number]  Unit/Bed#:  Encounter: [id number]      HPI:  Steph Ceron is a 39 y.o. female who presents status post sleeve gastrectomy years ago.      Review of Systems   All other systems reviewed and are negative.      Historical Information   Past Medical History:   Diagnosis Date    Abnormal Pap smear of cervix     Anemia     History of sleep apnea      Past Surgical History:   Procedure Laterality Date    APPENDECTOMY      BARIATRIC SURGERY      TN COLPOSCOPY CERVIX VAG LOOP ELTRD BX CERVIX N/A 09/15/2020    Procedure: BIOPSY LEEP CERVIX;  Surgeon: Abhishek Roy MD;  [facility]: [facility];  Service: Gynecology    TN LAPAROSCOPY W/RMVL ADNEXAL STRUCTURES Bilateral 09/15/2020    Procedure: SALPINGECTOMY, LAPAROSCOPIC;  Surgeon: Abhishek Roy MD;  Location: [facility];  Service: Gynecology    TN REMOVAL INTRAUTERINE DEVICE IUD N/A 09/15/2020    Procedure: REMOVAL OF INTRAUTERINE DEVICE (IUD);  Surgeon: Abhishek Roy MD;  [facility]: [facility];  Service: Gynecology    SPINE SURGERY  12/20/2022     Social History   Social History     Substance and Sexual Activity   Alcohol Use Not Currently     Social History     Substance and Sexual Activity   Drug Use Never     Social History     Tobacco Use   Smoking Status Never   Smokeless Tobacco Never     Family History: non-contributory    Meds/Allergies   all medications and allergies reviewed  No Known Allergies    Objective       Current Vitals:   Blood Pressure: 112/70 (05/24/24 1047)  Pulse: 77 (05/24/24 1047)  Temperature: (!) 96.6 °F (35.9 °C) (05/24/24 1047)  Temp Source: Tympanic (05/24/24 1047)  Height: 5' 5\" (165.1 cm) (05/24/24 1047)  Weight - Scale: 105 kg (231 lb 8 oz) (05/24/24 1047)  SpO2: 99 % (05/24/24 1047)        Invasive Devices       None                   Physical Exam  Vitals reviewed.   Constitutional:       General: She is not in acute distress.     Appearance: She is " well-developed. She is not diaphoretic.   HENT:      Head: Normocephalic and atraumatic.      Right Ear: External ear normal.      Left Ear: External ear normal.      Nose: Nose normal.   Eyes:      General: No scleral icterus.        Right eye: No discharge.         Left eye: No discharge.      Conjunctiva/sclera: Conjunctivae normal.   Abdominal:      Comments: Well-healed laparoscopic incisions from previous surgery   Neurological:      Mental Status: She is alert and oriented to person, place, and time.   Psychiatric:         Mood and Affect: Mood and affect normal.         Behavior: Behavior normal.         Thought Content: Thought content normal.         Judgment: Judgment normal.         Lab Results: I have personally reviewed pertinent lab results.    Imaging: I have personally reviewed pertinent reports.    EKG, Pathology, and Other Studies: I have personally reviewed pertinent reports.    Endoscopy revealed esophagitis LA grade a as well as sleeve pouch gastritis.  The upper portion of the sleeve was noticed to be slightly prominent.  Final Diagnosis  A. Stomach, biopsy:  -Gastric antral mucosa with reactive/chemical gastropathy.  -Gastric oxyntic mucosa with no significant histopathologic change.  -Negative for Helicobacter pylori organisms on H&E stain.        Assessment/PLAN:    39 y.o. female s/p Vertical Sleeve Gastrectomy with Dr Alvarez in 2017 at [facility] in [place]. Presents to the office last year with weight regain.     Her highest weight prior to surgery was around 235 lbs and lowest was 187 lbs about 1.5 years postop. Denies any postop complications. She reports started regaining weight gradually around 2018 despite exercise. In 2020 she started gaining more significant amount of weight. Overall states she eats a healthy diet.      She was interested in revision if appropriate. She states initially the surgeon had recommended bypass but patient ultimately chose the sleeve.      I have  reviewed myself the upper GI images that were obtained and the study revealed Status post gastric sleeve procedure. Possible mild reexpansion of gastric volume but this is uncertain without any postoperative comparison imaging.     She then underwent a EGD that revealed esophagitis and slight prominent upper portion of the sleeve.    She has been working within our program and she was considering having the conversion to a gastric bypass but unfortunately lost her insurance and now she is insured again and wants to resume the process.    I had a detailed discussion with her stressing the fact that long-term success is largely dependent on compliance and abidance to the recommendations of the program as well as participation within the support groups.    She is committed to continue to observe her diet and to increase her physical activity.    She will follow up with us as scheduled.    Neel Clark MD  5/24/2024  10:53 AM      .

## 2024-05-28 ENCOUNTER — OFFICE VISIT (OUTPATIENT)
Dept: OBGYN CLINIC | Facility: CLINIC | Age: 39
End: 2024-05-28

## 2024-05-28 VITALS
SYSTOLIC BLOOD PRESSURE: 132 MMHG | DIASTOLIC BLOOD PRESSURE: 93 MMHG | BODY MASS INDEX: 38.79 KG/M2 | HEIGHT: 65 IN | WEIGHT: 232.8 LBS | HEART RATE: 71 BPM

## 2024-05-28 DIAGNOSIS — Z71.85 HPV VACCINE COUNSELING: ICD-10-CM

## 2024-05-28 DIAGNOSIS — Z01.419 ENCOUNTER FOR ANNUAL ROUTINE GYNECOLOGICAL EXAMINATION: Primary | ICD-10-CM

## 2024-05-28 DIAGNOSIS — Z11.3 SCREEN FOR STD (SEXUALLY TRANSMITTED DISEASE): ICD-10-CM

## 2024-05-28 PROCEDURE — 87491 CHLMYD TRACH DNA AMP PROBE: CPT | Performed by: NURSE PRACTITIONER

## 2024-05-28 PROCEDURE — 87591 N.GONORRHOEAE DNA AMP PROB: CPT | Performed by: NURSE PRACTITIONER

## 2024-05-28 PROCEDURE — 99395 PREV VISIT EST AGE 18-39: CPT | Performed by: NURSE PRACTITIONER

## 2024-05-28 PROCEDURE — 90651 9VHPV VACCINE 2/3 DOSE IM: CPT | Performed by: NURSE PRACTITIONER

## 2024-05-28 PROCEDURE — 90471 IMMUNIZATION ADMIN: CPT | Performed by: NURSE PRACTITIONER

## 2024-05-28 NOTE — PATIENT INSTRUCTIONS
Return in 2 months and 6 months for HPV vaccines  STD results can take up to 2 weeks  Remember safe sex and condom use  Call with needs or concerns  Return in 1 year

## 2024-05-28 NOTE — PROGRESS NOTES
Annual Exam    Assessment   1. Encounter for annual routine gynecological examination        2. Screen for STD (sexually transmitted disease)  Chlamydia/GC amplified DNA by PCR    RPR-Syphilis Screening (Total Syphilis IGG/IGM)    HIV 1/2 AB/AG w Reflex [facility] for 2 yr old and above    Hepatitis C RNA, quantitative, PCR    Hepatitis B surface antigen      3. HPV vaccine counseling  HPV Vaccine 9 valent IM        well woman       Plan       All questions answered.  Breast self exam technique reviewed and patient encouraged to perform self-exam monthly.  Chlamydia specimen.  Contraception: tubal ligation.  Follow up in 1 year.  GC specimen.     Patient Instructions   Return in 2 months and 6 months for HPV vaccines  STD results can take up to 2 weeks  Remember safe sex and condom use  Call with needs or concerns  Return in 1 year  Pt verbalized understanding of all discussed.      Subjective      Steph Ceron is a 39 y.o.  female who presents for annual well woman exam. Periods are regular every 28-30 days, lasting 5 days. No intermenstrual bleeding, spotting, or discharge.  2023 WNL PAP and negative HPV  1 partner x 16 years, denies domestic violence    Depression Screening Follow-up Plan: Patient's depression screening was negative with a PHQ-2 score of 0. Their PHQ-9 score was 2. Clinically patient does not have depression. No treatment is required.      Current contraception: tubal ligation  History of abnormal Pap smear: yes -  HGSIL and colpo  Family history of uterine or ovarian cancer: no  Regular self breast exam: yes  History of abnormal mammogram: N/A  Family history of breast cancer: no  History of abnormal lipids: no  Menstrual History:  OB History          3    Para   3    Term   3            AB        Living   3         SAB        IAB        Ectopic        Multiple        Live Births   3                Menarche age: 13  Patient's last menstrual period was 2024  "(approximate).       The following portions of the patient's history were reviewed and updated as appropriate: allergies, current medications, past family history, past medical history, past social history, past surgical history and problem list.    Review of Systems  Pertinent items are noted in HPI.      Objective      /93 (BP Location: Left arm, Patient Position: Sitting, Cuff Size: Large)   Pulse 71   Ht 5' 5\" (1.651 m)   Wt 106 kg (232 lb 12.8 oz)   LMP 05/05/2024 (Approximate)   BMI 38.74 kg/m²     General: alert and oriented, in no acute distress, alert, appears stated age, and cooperative   Heart: NSR   Lungs: clear to auscultation bilaterally, WNL respiratory effort, negative cough or SOB   Thyroid: Negative masses palpable   Abdomen: soft, non-tender, without masses or organomegaly palpable   Vulva: normal   Vagina: normal mucosa   Cervix: no cervical motion tenderness and no lesions   Uterus: normal size, non-tender, normal shape and consistency   Adnexa: normal adnexa   Urethra: normal   Breasts: NT,negative masses palpable, negative discharge, or dimpling             "

## 2024-05-28 NOTE — PROGRESS NOTES
PT given 1st HPV Vaccine dose on the left deltoid.     NDC#:2740-6085-61  Lot#:[id number]  Exp date:09/20/2025

## 2024-05-28 NOTE — LETTER
2024    To Steph Ceron  : 1985      This letter is to advise you that your recent CULTURES for gonorrhea and chlamydia were reviewed by me and are NORMAL.  Please contact the office for an appointment if you have any additional concerns.    NOEMI Brown

## 2024-05-29 LAB
C TRACH DNA SPEC QL NAA+PROBE: NEGATIVE
N GONORRHOEA DNA SPEC QL NAA+PROBE: NEGATIVE

## 2024-06-05 ENCOUNTER — OFFICE VISIT (OUTPATIENT)
Dept: BARIATRICS | Facility: CLINIC | Age: 39
End: 2024-06-05

## 2024-06-05 VITALS — WEIGHT: 232.5 LBS | BODY MASS INDEX: 38.74 KG/M2 | HEIGHT: 65 IN

## 2024-06-05 VITALS — BODY MASS INDEX: 38.74 KG/M2 | WEIGHT: 232.8 LBS

## 2024-06-05 DIAGNOSIS — Z01.818 PRE-OPERATIVE CLEARANCE: ICD-10-CM

## 2024-06-05 DIAGNOSIS — Z98.84 BARIATRIC SURGERY STATUS: ICD-10-CM

## 2024-06-05 DIAGNOSIS — Z98.84 BARIATRIC SURGERY STATUS: Primary | ICD-10-CM

## 2024-06-05 DIAGNOSIS — E66.9 OBESITY, CLASS II, BMI 35-39.9: Primary | ICD-10-CM

## 2024-06-05 DIAGNOSIS — R63.5 ABNORMAL WEIGHT GAIN: ICD-10-CM

## 2024-06-05 DIAGNOSIS — Z48.815 ENCOUNTER FOR SURGICAL AFTERCARE FOLLOWING SURGERY OF DIGESTIVE SYSTEM: ICD-10-CM

## 2024-06-05 DIAGNOSIS — Z98.84 STATUS POST LAPAROSCOPIC SLEEVE GASTRECTOMY: ICD-10-CM

## 2024-06-05 DIAGNOSIS — E66.9 OBESITY, CLASS II, BMI 35-39.9: ICD-10-CM

## 2024-06-05 DIAGNOSIS — K91.2 POSTSURGICAL MALABSORPTION: Primary | ICD-10-CM

## 2024-06-05 PROCEDURE — RECHECK

## 2024-06-05 PROCEDURE — RECHECK: Performed by: DIETITIAN, REGISTERED

## 2024-06-05 NOTE — PROGRESS NOTES
Bariatric Behavioral Health Evaluation     Level 2 revision pathway.       Presenting Problem:   .Steph Ceron  is a 38 y.o.   female    :  1985   Gained weight after multiple child births.  Was following with a dietitian and then was referred to bariatric program for her weight loss.  Entered bariatric program for about six months.     Vertical sleeve gastrectomy with Dr Alvarez at [facility] in [place]    Surgery Date: Spring  2017     highest weight prior to surgery was around 235 lbs and lowest was 187 lbs about 1.5 years postop. She reports started regaining weight gradually around  despite exercise.       underwent a EGD 10/23/2023 that revealed esophagitis and slight prominent upper portion of the sleeve.      Surgeon: Dr. Neel Clark     Is the patient seeking Bariatric Surgery?   YES  Was in program previously and lost her insurance.  Now returning with insurance coverage.  Will follow [facility] for 6 months and revisit the concept of a revision      Realizes Post- Op Requirements? Yes, and will learn more meeting with the dietitians and social workers through the process     Pre-morbid level of function and history of present illness: 2022 back surgery, (slipped disc)  bed bound, moved from [place] (walking) to [place] (driving)  (Was an injury at work)     Stressors and Supports:   is supportive of her goals.   Stressor:  medical/pain     Living situation:  and three children in the home.  ( 16, 13, 12).  Home cooked meals in the home.  Mother in law in the home and Sunshine will care for her ADL's     moved to [place] from [place]      Work: PT food deliveries    AM hours only  [employer]     Physical Activity: history of going to the gym.   Chronic pain will interrupt her routine.  (History of Back surgery)   some walking for movement.      Family History (medical, traditions, culture, rules/routines around food):      Brother is post bariatric surgery. ([place])  No other  weight concerns with immediate family.      Mental Health, Trauma and Substance use Assessment     Psychiatric/Psychological Treatment Diagnosis:   nothing in chart.     Stated she had positive emotions post bariatric surgery.    Coping skills for stress: sleep   laying down is more comfortable for her.     History of Eating Disorder:  denies emotional eating.  Does has history of eating fast.  Feels she can eat 1 1/2 cups of food at one time.  May skip dinner.  Fast food on weekends   Apple juice and reg soda for liquid calories. Soda on weekends.   Has reduced liquid calories, but has not eliminated.  Adding more water.   Feels hungry often     Outpatient Counselor No      Psychiatrist No      Have you had any Mental Health Higher level of care (ED, PHP or Inpatient ) Treatment? No     Drug and/or Alcohol use and treatment history: none noted     Have you had any Substance Use Treatment?  No     Tobacco/Vaping History: denied any nicotine use in any form.   Patient agrees to remain nicotine free post surgery.        Risk Assessment     Identified support system intact.      Risk of harm to self or others:  none noted during evaluation  No HI/SI       Presence of Audio/Visual Hallucinations: Not reported during evaluation      Access to weapons: Not reported during evaluation      Observation: this interview only (BEENA and RD will follow Steph Ceron through the bariatric program)      Based on the previous information, the client presents the following risk of harm to self or others:  Low risk           Physical/Mental Health Status:               Appearance: appropriate           Sociability: average           Affect: appropriate           Mood: calm           Thought Process: coherent           Speech: normal           Content: no impairment           Orientation: person  Yes , place  Yes , time  Yes , normal attention span  Yes , normal memory  Yes   and normal judgement  Yes            Insight: emotional   good        BARIATRIC SURGERY EDUCATION CHECKLIST     Patient has received the following education related to the bariatric surgery process and understands:     Patients may be required to complete a psychiatric evaluation and receive clearance for surgery from mental health provider.     Patients who undergo weight loss surgery are at higher risk of increased mental health concerns and suicide attempts.     Patients may be required to complete a full substance abuse evaluation and then complete all treatment recommendations prior to surgery.     If diagnosis of abuse/dependence results, patient may be required to remain sober for one (1) year before having bariatric surgery.     Patients on psychiatric medications should check with their provider to discuss psychiatric medications and the changes in absorption.  Patient should discuss all time release medications with provider and take all medications as prescribed.     The recommendation is that there is no use of any tobacco products, Hookah or vapes for the bariatric post-operation patient.     Bariatric surgery patients should not consume alcohol as a post-operative patient as it may increase risk of numerous health conditions including but not limited to alcohol abuse and ulcers.     There is a possibility of weight regain if patient does not follow all program guidelines and recommendations.     Bariatric surgery patients should exercise thirty (30) to sixty (60) minutes per day to maintain post-surgical weight loss.     Research indicates that bariatric patients are more successful when they see a therapist for up to two (2) years post-op.     Patients will follow all medical and dietary recommendations provided.     Patient will keep all scheduled appointments and follow up with their physician for a minimum of five (5) years.     Patient will take all vitamins as recommended.  Post-operative vitamins are life-long.     There is a goal month set.  All  requirements should be met by this time. Don't wait to get started!     There is a deadline month set.  All requirements must be finished by this time and if not, the patient will be halted in the surgery process. The patient can be referred to the medical weight management program or can come back to the surgical program once the unfinished tasks from the previous program are completed.       Female patients of childbearing years are informed that pregnancy is not recommended until 12 - 18 months post-op.        Recommendations: Recommended for surgery  yes  6 months referral to medical weight loss and reassessment to see if she would qualify for a revision  Level 2 revisional pathway        Social Service Note:  Patient presented for behavioral health evaluation for the bariatric program.   Negative for Mental Health.    No reported history of therapy.   No reported history of Psychiatry.   No reported  Drug and/or Alcohol abuse or treatment.  No reported tobacco use.  Patient educated regarding the impact of nicotine and alcohol on the post surgery bariatric patient. Patient has a negative family history of tobacco and alcohol addiction.     Patient has not reported contraindications for bariatric surgery.  Patient will begin making changes with relationship with food through behavior modifications and mindful techniques.  Tracking food intake for one week every three months can assist with weight maintenance and self accountability for post surgery success.   and Dietitian follow up visits are available for pre and post surgery support.  Bariatric support group is available monthly.   Patient verbalized the ability to start making changes and create a healthy relationship around nutrition.   Patient meets criteria for surgery at this time and is referred to the bariatric surgeon.          Louis Hester LCSW

## 2024-06-05 NOTE — PROGRESS NOTES
"Bariatric Nutrition Assessment Note  \"Patient was initially planning to undergo a gastric bypass in [place] but eventually had a sleeve gastrectomy. She lost about 50lbs following her surgery but has regained most of her weight back. She is now interested in additional options for weight loss. Patient was initially planning to undergo a gastric bypass in [place] but eventually had a sleeve gastrectomy. She lost about 50lbs following her surgery but has regained most of her weight back. She is now interested in additional options for weight loss.  Discussed with patient regarding 6 months referral to medical weight loss and reassessment to see if she would qualify for a revision  Level 2 revisional pathway\"    Pt started the revision process but lost her job and insurance and was halted from the program  She now has insurance and is restarting the program.     Type of surgery    Vertical sleeve gastrectomy with Dr Alvarez at [facility] in [place], [place]  Surgery Date: 7/31/2017  6 years post-op  Surgeon: Dr. Neel Clark  Pre op for possible revision     Nutrition Assessment   Steph Ceron  39 y.o.  female   LMP 05/05/2024 (Approximate)   Wt 106 kg (232 lb 12.8 oz)   LMP 05/05/2024 (Approximate)   BMI 38.74 kg/m²   Wt Readings from Last 3 Encounters:   06/05/24 106 kg (232 lb 12.8 oz)   06/05/24 105 kg (232 lb 8 oz)   05/28/24 106 kg (232 lb 12.8 oz)        Emily Estrella Equation:  CGB=2413bvcq/day  Weight Maintenance= 2072kcal/day  Estimated calories for weight loss 1072-1572kcal/day (1-2# per wk wt loss - sedentary)  Estimated protein needs 68.3-102.4g/day (1.0-1.5 gms/kg IBW)  Estimated fluid needs 2049-2391ml/day (30-35 ml/kg IBW)    Weight on Day of Weight Loss Surgery: 235lbs  Weight in (lb) to have BMI = 25: 150.25lbs  Pre-Op Excess Wt: 84.75lbs  Post-op thien= 181lbs  47lb weight regain  Post-Op Wt Loss: 7#/ 8.3% EBWL in 4 year(s)    Maximum Wt Lost:  highest weight prior to surgery was around 235 lbs and " lowest was 181 lbs about 1.5 years postop. She reports started regaining weight gradually around 2018 despite exercise. In 2020 she started gaining more significant amount of weight.     Review of History and Medications   Past Medical History:   Diagnosis Date    Abnormal Pap smear of cervix     Anemia     History of sleep apnea      Past Surgical History:   Procedure Laterality Date    APPENDECTOMY      BARIATRIC SURGERY      AL COLPOSCOPY CERVIX VAG LOOP ELTRD BX CERVIX N/A 09/15/2020    Procedure: BIOPSY LEEP CERVIX;  Surgeon: Abhishek Roy MD;  [facility]: [facility];  Service: Gynecology    AL LAPAROSCOPY W/RMVL ADNEXAL STRUCTURES Bilateral 09/15/2020    Procedure: SALPINGECTOMY, LAPAROSCOPIC;  Surgeon: Abhishek Roy MD;  Location: [facility];  Service: Gynecology    AL REMOVAL INTRAUTERINE DEVICE IUD N/A 09/15/2020    Procedure: REMOVAL OF INTRAUTERINE DEVICE (IUD);  Surgeon: Abhishek Roy MD;  Location: [facility];  Service: Gynecology    SPINE SURGERY  12/20/2022     Social History     Socioeconomic History    Marital status: /Civil Union     Spouse name: Not on file    Number of children: 3    Years of education: Not on file    Highest education level: Some college, no degree   Occupational History    Occupation: [employer]    Tobacco Use    Smoking status: Never    Smokeless tobacco: Never   Vaping Use    Vaping status: Never Used   Substance and Sexual Activity    Alcohol use: Yes     Comment: soccially/ocassionally    Drug use: Never    Sexual activity: Yes     Partners: Male     Birth control/protection: Female Sterilization   Other Topics Concern    Not on file   Social History Narrative    Not on file     Social Determinants of Health     Financial Resource Strain: Low Risk  (5/28/2024)    Overall Financial Resource Strain (CARDIA)     Difficulty of Paying Living Expenses: Not hard at all   Recent Concern: Financial Resource Strain - High Risk (5/24/2024)    Overall Financial Resource Strain (CARDIA)      Difficulty of Paying Living Expenses: Very hard   Food Insecurity: No Food Insecurity (5/28/2024)    Hunger Vital Sign     Worried About Running Out of Food in the Last Year: Never true     Ran Out of Food in the Last Year: Never true   Recent Concern: Food Insecurity - Food Insecurity Present (5/24/2024)    Hunger Vital Sign     Worried About Running Out of Food in the Last Year: Sometimes true     Ran Out of Food in the Last Year: Often true   Transportation Needs: No Transportation Needs (5/28/2024)    PRAPARE - Transportation     Lack of Transportation (Medical): No     Lack of Transportation (Non-Medical): No   Physical Activity: Sufficiently Active (6/11/2021)    Exercise Vital Sign     Days of Exercise per Week: 4 days     Minutes of Exercise per Session: 60 min   Stress: No Stress Concern Present (6/11/2021)    [employer] - Occupational Stress Questionnaire     Feeling of Stress : Not at all   Social Connections: Moderately Isolated (6/11/2021)    Social Connection and Isolation Panel [NHANES]     Frequency of Communication with Friends and Family: More than three times a week     Frequency of Social Gatherings with Friends and Family: Once a week     Attends Anglican Services: Never     Active Member of Clubs or Organizations: No     Attends Club or Organization Meetings: Never     Marital Status:    Intimate Partner Violence: Not At Risk (6/11/2021)    Humiliation, Afraid, Rape, and Kick questionnaire     Fear of Current or Ex-Partner: No     Emotionally Abused: No     Physically Abused: No     Sexually Abused: No   Housing Stability: Low Risk  (5/28/2024)    Housing Stability Vital Sign     Unable to Pay for Housing in the Last Year: No     Number of Times Moved in the Last Year: 1     Homeless in the Last Year: No   Recent Concern: Housing Stability - High Risk (5/24/2024)    Housing Stability Vital Sign     Unable to Pay for Housing in the Last Year: Yes      Number of Times Moved in the Last Year: Not on file     Homeless in the Last Year: No       Current Outpatient Medications:     cyanocobalamin (VITAMIN B-12) 50 MCG tablet, Take 50 mcg by mouth, Disp: , Rfl:     ferrous sulfate 325 (65 Fe) mg tablet, Take 325 mg by mouth daily with breakfast, Disp: , Rfl:     Multiple Vitamin (multivitamin) capsule, Take 1 capsule by mouth daily, Disp: , Rfl:     VITAMIN D PO, Take by mouth, Disp: , Rfl:     Food Intake and Lifestyle Assessment   Food Intake Assessment completed via usual diet recall  Takes her daughter to school   Wakes 7:30 am  4oz coffee with 2 spoons sugar  Breakfast: 8:30am: boiled egg with 2 slices of wheat bread   Snack: usually not.    Lunch: 1 pm: beans &  rice, meat: mostly chicken, sometimes beef or pork or shrimp, salad, plantains  Trying to eat more vegetables instead of rice or will eat her beans first   Snack : 0   Dinner : 5pm: protein shake with fruit  or cereal and fruit   Snack: sometimes bread if hungry , not usually   When mother in law lives with her she eats dinner   Otherwise will have a snack instead   Beverage intake: water, apple juice, and coffee  Protein supplement: Once per day - Gold Standard Whey protein mixed milk (1%)   Estimated protein intake per day: 60-70 g protein   Estimated fluid intake per day:  two to three 16-oz connell, 8 oz coffee, 4-8 oz juice  Meals eaten away from home: weekends: once per week will get pizza   Typical meal pattern: 2 meals per day and 1 snacks per day  Eating Behaviors: Consumption of high calorie beverages and skips dinner when mother in law not with her    Food allergies or intolerances: No Known Allergies FA  Cultural or Hoahaoism considerations:     Physical Assessment  Physical Activity  Types of exercise: Some walking.   Goes to the gym - 2-3 times per week for 25 minutes - does recumbant bike or treadmill   Can only do light lifting.   Current physical limitations: back injury May 2022  "back surgery December 2022.  Did 3 months of PT  Had surgery on her back for bulging disk last year, has some limitations for due to the surgery     Psychosocial Assessment   Support systems: lives with spouse and children: 15yo, 12yo, 10yo, mother in law currently living with her   Socioeconomic factors: works PT for [employer]   Brother had WLS after she did   Friend had WLS last year    Nutrition Diagnosis  Diagnosis: Overweight / Obesity (NC-3.3), Excessive energy intake (NI-1.5), Inadequate protein intake (NI-5.7.3), and Altered GI function (NC-1.4)  Related to: Physical inactivity, Excessive energy intake, and Altered GI function  As Evidenced by: BMI >25, Expected anthropometric outcomes are not achieved, Excessive energy intake, and Unintentional weight gain     Nutrition Prescription: Recommend the following diet  1200 calories/ 65-75 grams protein per day     Interventions and Teaching   Discussed pre-op and post-op nutrition guidelines.       Patient educated and handouts provided.  Surgical changes to stomach / GI  Capacity of post-surgery stomach  Diet progression  Adequate hydration  Sugar and fat restriction to decrease \"dumping syndrome\"  Expected weight loss  Weight loss plateaus/ possibility of weight regain  Exercise  Nutrition considerations after surgery  Protein supplements  Meal planning and preparation  Appropriate carbohydrate, protein, and fat intake, and food/fluid choices to maximize safe weight loss, nutrient intake, and tolerance   Dietary and lifestyle changes  Possible problems with poor eating habits  Techniques for self monitoring and keeping daily food journal  Potential for food intolerance after surgery, and ways to deal with them including: lactose intolerance, nausea, reflux, vomiting, diarrhea, food intolerance, appetite changes, gas  Vitamin / Mineral supplementation of Multivitamin with minerals and Calcium  Currently taking:  Vitamin D 1000mg  Iron 65mg  Multivitamin- OTC " gummy once daily  Stopped taking Vit B12  Ordered nutritional labs to assess if needs are being met     Patient is not currently pregnant and doesn't desire to become pregnant a minimum of one year post-op- pt had tubal ligation.     Education provided to: patient    Barriers to learning: No barriers identified    Readiness to change: preparation/ action     Comprehension: verbalizes understanding and needs reinforcement     Expected Compliance: good    Recommendations  Pt is an appropriate candidate for surgery. Yes    Evaluation / Monitoring  Dietitian to Monitor: Eating pattern as discussed Tolerance of nutrition prescription Body weight Lab values Physical activity Bowel pattern    Goals  Eliminate sugar sweetened beverages, Food journal, Exercise 30 minutes 5 times per week, Complete lession plans 1-6, and Eat 3 meals per day  Food log 1200 calories per day 65-75 grams protein   Consider switching to a bariatric vitamin and mineral to reduce the amount of pills take per day   Provided with sample of bariatric pal one per day multivitamin and mineral 30 day supply to trail  Continue with one protein shake and one calcium supplement to meet your needs ( pt is cutting calcium supplement to increase absorption )  Practice 30/30 rule to increase satiety     Time Spent:   1 Hour

## 2024-06-05 NOTE — PROGRESS NOTES
Spoke to patient in person:    Patient is interested in the bariatric surgical process. Patient qualifies for surgery with current comorbidities and BMI. Discussed the entire surgical workup process and all requirements of [facility] program and patient's insurance. Answered all questions at the time of in person appointment. All SW/RD questions redirected to the next appointment, the 2-hour evaluation.    Patient has been informed to contact insurance to verify there is Bariatric surgery  coverage written on the policy prior next appointment. Also to discuss of any out of pocket expense if insurance does not cover at 100%      Patient verbalized understanding of the surgical process at [facility] and had no further questions at this time.

## 2024-07-03 ENCOUNTER — TELEPHONE (OUTPATIENT)
Dept: FAMILY MEDICINE CLINIC | Facility: CLINIC | Age: 39
End: 2024-07-03

## 2024-07-08 ENCOUNTER — OFFICE VISIT (OUTPATIENT)
Dept: FAMILY MEDICINE CLINIC | Facility: CLINIC | Age: 39
End: 2024-07-08
Payer: COMMERCIAL

## 2024-07-08 VITALS
HEIGHT: 65 IN | OXYGEN SATURATION: 99 % | SYSTOLIC BLOOD PRESSURE: 132 MMHG | BODY MASS INDEX: 38.65 KG/M2 | RESPIRATION RATE: 14 BRPM | HEART RATE: 69 BPM | TEMPERATURE: 98.2 F | DIASTOLIC BLOOD PRESSURE: 82 MMHG | WEIGHT: 232 LBS

## 2024-07-08 DIAGNOSIS — Z90.49 S/P APPENDECTOMY: ICD-10-CM

## 2024-07-08 DIAGNOSIS — R42 DIZZINESS: Primary | ICD-10-CM

## 2024-07-08 DIAGNOSIS — R87.620 ATYPICAL SQUAMOUS CELL CHANGES OF UNDETERMINED SIGNIFICANCE (ASCUS) ON VAGINAL CYTOLOGY WITH POSITIVE HIGH RISK HUMAN PAPILLOMA VIRUS (HPV): ICD-10-CM

## 2024-07-08 DIAGNOSIS — Z98.890 HISTORY OF LUMBAR LAMINECTOMY: ICD-10-CM

## 2024-07-08 DIAGNOSIS — K21.9 GASTROESOPHAGEAL REFLUX DISEASE WITHOUT ESOPHAGITIS: ICD-10-CM

## 2024-07-08 DIAGNOSIS — R87.613 HIGH GRADE SQUAMOUS INTRAEPITHELIAL LESION OF CERVIX: Chronic | ICD-10-CM

## 2024-07-08 DIAGNOSIS — Z98.84 STATUS POST LAPAROSCOPIC SLEEVE GASTRECTOMY: ICD-10-CM

## 2024-07-08 DIAGNOSIS — Z98.51: ICD-10-CM

## 2024-07-08 DIAGNOSIS — R53.83 OTHER FATIGUE: ICD-10-CM

## 2024-07-08 DIAGNOSIS — R94.31 ABNORMAL ECG: ICD-10-CM

## 2024-07-08 DIAGNOSIS — R87.811 ATYPICAL SQUAMOUS CELL CHANGES OF UNDETERMINED SIGNIFICANCE (ASCUS) ON VAGINAL CYTOLOGY WITH POSITIVE HIGH RISK HUMAN PAPILLOMA VIRUS (HPV): ICD-10-CM

## 2024-07-08 DIAGNOSIS — E04.1 THYROID NODULE: ICD-10-CM

## 2024-07-08 LAB — SL AMB POCT HEMOGLOBIN AIC: 5.3 (ref ?–6.5)

## 2024-07-08 PROCEDURE — 99204 OFFICE O/P NEW MOD 45 MIN: CPT | Performed by: INTERNAL MEDICINE

## 2024-07-08 PROCEDURE — 83036 HEMOGLOBIN GLYCOSYLATED A1C: CPT | Performed by: INTERNAL MEDICINE

## 2024-07-08 PROCEDURE — 93000 ELECTROCARDIOGRAM COMPLETE: CPT | Performed by: INTERNAL MEDICINE

## 2024-07-08 RX ORDER — MULTIVITAMIN
1 CAPSULE ORAL DAILY
Qty: 100 CAPSULE | Refills: 6 | Status: SHIPPED | OUTPATIENT
Start: 2024-07-08

## 2024-07-08 NOTE — PROGRESS NOTES
Ambulatory Visit  Name: Steph Ceron      : 1985      MRN: [id number]  Encounter Provider: Navin Diallo MD  Encounter Date: 2024   Encounter department: [facility]    Assessment & Plan   1. Dizziness  -     POCT hemoglobin A1c  -     POCT ECG  -     Ferritin; Future  -     Lyme Total AB W Reflex to IGM/IGG; Future  -     Chronic Hepatitis Panel; Future  -     RPR (DX) W/REFL TITER AND CONFIRM TESTING (REFL); Future  -     Chlamydia/GC amplified DNA by PCR; Future  -     Echo complete w/ contrast if indicated; Future; Expected date: 2024  -     Vitamin A; Future  -     Vitamin E; Future  -     Vitamin B6; Future  -     Vitamin D 25 hydroxy; Future; Expected date: 2024  -     Vitamin B12; Future  -     Magnesium; Future  -     Lipid panel; Future; Expected date: 07/15/2024  -     CBC and differential; Future; Expected date: 07/15/2024  -     Comprehensive metabolic panel; Future; Expected date: 2024  -     Ferritin  -     Lyme Total AB W Reflex to IGM/IGG  -     Chlamydia/GC amplified DNA by PCR  -     Vitamin A  -     Vitamin E  -     Vitamin B6  -     Vitamin D 25 hydroxy  -     Vitamin B12  -     Magnesium  -     Lipid panel  -     CBC and differential  -     Comprehensive metabolic panel  -     Holter monitor; Future; Expected date: 2024  2. Atypical squamous cell changes of undetermined significance (ASCUS) on vaginal cytology with positive high risk human papilloma virus (HPV)  -     Magnesium; Future  -     Lipid panel; Future; Expected date: 07/15/2024  -     CBC and differential; Future; Expected date: 07/15/2024  -     Comprehensive metabolic panel; Future; Expected date: 2024  -     Magnesium  -     Lipid panel  -     CBC and differential  -     Comprehensive metabolic panel  3. High grade squamous intraepithelial lesion of cervix  4. Status post laparoscopic sleeve gastrectomy  -     Multiple Vitamin (multivitamin)  capsule; Take 1 capsule by mouth daily  -     Vitamin A; Future  -     Vitamin E; Future  -     Vitamin B6; Future  -     Vitamin D 25 hydroxy; Future; Expected date: 07/08/2024  -     Vitamin B12; Future  -     Magnesium; Future  -     Lipid panel; Future; Expected date: 07/15/2024  -     CBC and differential; Future; Expected date: 07/15/2024  -     Comprehensive metabolic panel; Future; Expected date: 07/08/2024  -     Vitamin A  -     Vitamin E  -     Vitamin B6  -     Vitamin D 25 hydroxy  -     Vitamin B12  -     Magnesium  -     Lipid panel  -     CBC and differential  -     Comprehensive metabolic panel  5. S/P appendectomy  6. History of bilateral ligation of fallopian tubes  7. History of lumbar laminectomy  8. BMI 38.0-38.9,adult  -     POCT hemoglobin A1c  -     POCT ECG  9. Gastroesophageal reflux disease without esophagitis  -     H. pylori antigen, stool; Future  -     H. pylori antigen, stool  10. Other fatigue  -     Ferritin; Future  -     Lyme Total AB W Reflex to IGM/IGG; Future  -     Chronic Hepatitis Panel; Future  -     RPR (DX) W/REFL TITER AND CONFIRM TESTING (REFL); Future  -     Chlamydia/GC amplified DNA by PCR; Future  -     Echo complete w/ contrast if indicated; Future; Expected date: 07/08/2024  -     Vitamin A; Future  -     Vitamin E; Future  -     Vitamin B6; Future  -     Vitamin D 25 hydroxy; Future; Expected date: 07/08/2024  -     Vitamin B12; Future  -     Magnesium; Future  -     Lipid panel; Future; Expected date: 07/15/2024  -     CBC and differential; Future; Expected date: 07/15/2024  -     Comprehensive metabolic panel; Future; Expected date: 07/08/2024  -     Ferritin  -     Lyme Total AB W Reflex to IGM/IGG  -     Chlamydia/GC amplified DNA by PCR  -     Vitamin A  -     Vitamin E  -     Vitamin B6  -     Vitamin D 25 hydroxy  -     Vitamin B12  -     Magnesium  -     Lipid panel  -     CBC and differential  -     Comprehensive metabolic panel  11. Thyroid nodule  -    "  TSH, 3rd generation; Future; Expected date: 07/08/2024  -     T4, free; Future; Expected date: 07/08/2024  -     Thyroid Antibodies Panel; Future  -     US thyroid; Future; Expected date: 07/08/2024  -     TSH, 3rd generation  -     T4, free  12. Abnormal ECG  -     Holter monitor; Future; Expected date: 07/08/2024  Life style mod  RTC in 1-2 mos w Blood work       History of Present Illness     39 Y O Lady is here for First time in my office, complete H/P Discussed w Pt, she has few symptoms,...        Review of Systems   Constitutional:  Positive for fatigue. Negative for chills and fever.   HENT:  Positive for postnasal drip. Negative for congestion, facial swelling, sore throat, trouble swallowing and voice change.    Eyes:  Negative for pain, discharge and visual disturbance.   Respiratory:  Negative for cough, shortness of breath and wheezing.    Cardiovascular:  Positive for palpitations. Negative for chest pain and leg swelling.   Gastrointestinal:  Negative for abdominal pain, blood in stool, constipation, diarrhea and nausea.   Endocrine: Negative for polydipsia, polyphagia and polyuria.   Genitourinary:  Negative for difficulty urinating, hematuria and urgency.   Musculoskeletal:  Negative for arthralgias and myalgias.   Skin:  Negative for rash.   Neurological:  Positive for dizziness. Negative for tremors, weakness and headaches.   Hematological:  Negative for adenopathy. Does not bruise/bleed easily.   Psychiatric/Behavioral:  Negative for dysphoric mood, sleep disturbance and suicidal ideas.        Objective     /82 (BP Location: Left arm, Patient Position: Sitting, Cuff Size: Standard)   Pulse 69   Temp 98.2 °F (36.8 °C) (Tympanic)   Resp 14   Ht 5' 5\" (1.651 m)   Wt 105 kg (232 lb)   SpO2 99%   BMI 38.61 kg/m²     Physical Exam  Vitals and nursing note reviewed.   Constitutional:       General: She is not in acute distress.     Appearance: She is well-developed. She is not " diaphoretic.   HENT:      Head: Normocephalic and atraumatic.      Right Ear: External ear normal.      Left Ear: External ear normal.      Nose: Nose normal.   Eyes:      General:         Right eye: No discharge.         Left eye: No discharge.      Extraocular Movements: EOM normal.      Conjunctiva/sclera: Conjunctivae normal.      Pupils: Pupils are equal, round, and reactive to light.   Neck:      Thyroid: No thyromegaly.      Trachea: No tracheal deviation.   Cardiovascular:      Rate and Rhythm: Normal rate and regular rhythm.      Heart sounds: Murmur heard.      No friction rub.   Pulmonary:      Effort: Pulmonary effort is normal. No respiratory distress.      Breath sounds: Normal breath sounds. No stridor. No wheezing or rales.   Abdominal:      General: Bowel sounds are normal. There is no distension.      Palpations: Abdomen is soft.      Tenderness: There is no abdominal tenderness. There is no guarding.   Musculoskeletal:         General: No swelling, tenderness, deformity or edema. Normal range of motion.      Cervical back: Normal range of motion and neck supple.   Lymphadenopathy:      Cervical: No cervical adenopathy.   Skin:     General: Skin is warm and dry.      Capillary Refill: Capillary refill takes less than 2 seconds.      Coloration: Skin is not pale.      Findings: No erythema or rash.   Neurological:      Mental Status: She is alert and oriented to person, place, and time.      Cranial Nerves: No cranial nerve deficit.      Coordination: Coordination normal.   Psychiatric:         Mood and Affect: Mood and affect and mood normal.         Behavior: Behavior normal.       Administrative Statements

## 2024-07-11 ENCOUNTER — CLINICAL SUPPORT (OUTPATIENT)
Dept: BARIATRICS | Facility: CLINIC | Age: 39
End: 2024-07-11

## 2024-07-11 VITALS — BODY MASS INDEX: 38.41 KG/M2 | WEIGHT: 230.8 LBS

## 2024-07-11 DIAGNOSIS — Z98.84 BARIATRIC SURGERY STATUS: Primary | ICD-10-CM

## 2024-07-11 PROCEDURE — RECHECK: Performed by: DIETITIAN, REGISTERED

## 2024-07-11 NOTE — PROGRESS NOTES
"Bariatric Nutrition Assessment Note  \"Patient was initially planning to undergo a gastric bypass in [place] but eventually had a sleeve gastrectomy. She lost about 50lbs following her surgery but has regained most of her weight back. She is now interested in additional options for weight loss. Discussed with patient regarding 6 months referral to medical weight loss and reassessment to see if she would qualify for a revision  Level 2 revisional pathway\"    Pt started the revision process but lost her job and insurance and was halted from the program  She now has insurance and is restarting the program.     Type of surgery    Vertical sleeve gastrectomy with Dr Alvarez at [facility] in [place], [place]  Surgery Date: 7/31/2017  6 years post-op  Surgeon: Dr. Neel Clark  Pre op for possible revision     Nutrition Assessment   Steph Ceron  39 y.o.  female   Wt 105 kg (230 lb 12.8 oz)   BMI 38.41 kg/m²      -2# x 1 month     Wt Readings from Last 3 Encounters:   07/08/24 105 kg (232 lb)   06/05/24 106 kg (232 lb 12.8 oz)   06/05/24 105 kg (232 lb 8 oz)        Brant- [place] Equation:  VDT=8754ccvt/day  Weight Maintenance= 2072kcal/day  Estimated calories for weight loss 1072-1572kcal/day (1-2# per wk wt loss - sedentary)  Estimated protein needs 68.3-102.4g/day (1.0-1.5 gms/kg IBW)  Estimated fluid needs 2049-2391ml/day (30-35 ml/kg IBW)    Weight on Day of Weight Loss Surgery: 235lbs  Weight in (lb) to have BMI = 25: 150.25lbs  Pre-Op Excess Wt: 84.75lbs  Post-op thien= 181lbs  47lb weight regain  Post-Op Wt Loss: 7#/ 8.3% EBWL in 4 year(s)    Maximum Wt Lost:  highest weight prior to surgery was around 235 lbs and lowest was 181 lbs about 1.5 years postop. She reports started regaining weight gradually around 2018 despite exercise. In 2020 she started gaining more significant amount of weight.     Review of History and Medications   Past Medical History:   Diagnosis Date    Abnormal Pap smear of cervix     Anemia "     History of sleep apnea      Past Surgical History:   Procedure Laterality Date    APPENDECTOMY      BARIATRIC SURGERY      VA COLPOSCOPY CERVIX VAG LOOP ELTRD BX CERVIX N/A 09/15/2020    Procedure: BIOPSY LEEP CERVIX;  Surgeon: Abhishek Roy MD;  Location: [facility];  Service: Gynecology    VA LAPAROSCOPY W/RMVL ADNEXAL STRUCTURES Bilateral 09/15/2020    Procedure: SALPINGECTOMY, LAPAROSCOPIC;  Surgeon: Abhishek Roy MD;  Location: [facility];  Service: Gynecology    VA REMOVAL INTRAUTERINE DEVICE IUD N/A 09/15/2020    Procedure: REMOVAL OF INTRAUTERINE DEVICE (IUD);  Surgeon: Abhishek Roy MD;  Location: [facility];  Service: Gynecology    SPINE SURGERY  12/20/2022     Social History     Socioeconomic History    Marital status: /Civil Union     Spouse name: Not on file    Number of children: 3    Years of education: Not on file    Highest education level: Some college, no degree   Occupational History    Occupation: [employer]    Tobacco Use    Smoking status: Never    Smokeless tobacco: Never   Vaping Use    Vaping status: Never Used   Substance and Sexual Activity    Alcohol use: Yes     Comment: soccially/ocassionally    Drug use: Never    Sexual activity: Yes     Partners: Male     Birth control/protection: Female Sterilization   Other Topics Concern    Not on file   Social History Narrative    Not on file     Social Determinants of Health     Financial Resource Strain: Low Risk  (5/28/2024)    Overall Financial Resource Strain (CARDIA)     Difficulty of Paying Living Expenses: Not hard at all   Recent Concern: Financial Resource Strain - High Risk (5/24/2024)    Overall Financial Resource Strain (CARDIA)     Difficulty of Paying Living Expenses: Very hard   Food Insecurity: No Food Insecurity (5/28/2024)    Hunger Vital Sign     Worried About Running Out of Food in the Last Year: Never true     Ran Out of Food in the Last Year: Never true   Recent Concern: Food Insecurity - Food Insecurity Present  (5/24/2024)    Hunger Vital Sign     Worried About Running Out of Food in the Last Year: Sometimes true     Ran Out of Food in the Last Year: Often true   Transportation Needs: No Transportation Needs (5/28/2024)    PRAPARE - Transportation     Lack of Transportation (Medical): No     Lack of Transportation (Non-Medical): No   Physical Activity: Sufficiently Active (6/11/2021)    Exercise Vital Sign     Days of Exercise per Week: 4 days     Minutes of Exercise per Session: 60 min   Stress: No Stress Concern Present (6/11/2021)    [employer] - Occupational Stress Questionnaire     Feeling of Stress : Not at all   Social Connections: Moderately Isolated (6/11/2021)    Social Connection and Isolation Panel [NHANES]     Frequency of Communication with Friends and Family: More than three times a week     Frequency of Social Gatherings with Friends and Family: Once a week     Attends Nondenominational Services: Never     Active Member of Clubs or Organizations: No     Attends Club or Organization Meetings: Never     Marital Status:    Intimate Partner Violence: Not At Risk (6/11/2021)    Humiliation, Afraid, Rape, and Kick questionnaire     Fear of Current or Ex-Partner: No     Emotionally Abused: No     Physically Abused: No     Sexually Abused: No   Housing Stability: Low Risk  (5/28/2024)    Housing Stability Vital Sign     Unable to Pay for Housing in the Last Year: No     Number of Times Moved in the Last Year: 1     Homeless in the Last Year: No   Recent Concern: Housing Stability - High Risk (5/24/2024)    Housing Stability Vital Sign     Unable to Pay for Housing in the Last Year: Yes     Number of Times Moved in the Last Year: Not on file     Homeless in the Last Year: No       Current Outpatient Medications:     Multiple Vitamin (multivitamin) capsule, Take 1 capsule by mouth daily, Disp: 100 capsule, Rfl: 6    Food Intake and Lifestyle Assessment   Food Intake Assessment completed  via usual diet recall  Takes her daughter to school   Wakes 7:30 am  4oz coffee with 2 spoons sugar  Breakfast: 8:30am: boiled egg with 2 slices of wheat bread   Snack: usually not.    Lunch: 1 pm: beans &  rice, meat: mostly chicken, sometimes beef or pork or shrimp, salad, plantains  Trying to eat more vegetables instead of rice or will eat her beans first   Snack : 0   Dinner : 5pm: protein shake with fruit  or cereal and fruit   Snack: sometimes bread if hungry , not usually   When mother in law lives with her she eats dinner   Otherwise will have a snack instead   Beverage intake: water, apple juice, and coffee  Protein supplement: Once per day - Gold Standard Whey protein mixed milk (1%)   Estimated protein intake per day: 60-70 g protein   Estimated fluid intake per day:  two to three 16-oz connell, 8 oz coffee, 4-8 oz juice  Meals eaten away from home: weekends: once per week will get pizza   Typical meal pattern: 2 meals per day and 1 snacks per day  Eating Behaviors: Consumption of high calorie beverages and skips dinner when mother in law not with her    Food allergies or intolerances: No Known Allergies FA  Cultural or Congregation considerations:     Physical Assessment  Physical Activity  Types of exercise: Some walking.   Goes to the gym - 2-3 times per week for 25 minutes - does recumbant bike or treadmill   Can only do light lifting. - has stopped due to back pain and only walks   Current physical limitations: back injury May 2022 back surgery December 2022.  Did 3 months of PT  Had surgery on her back for bulging disk last year, has some limitations for due to the surgery     Psychosocial Assessment   Support systems: lives with spouse and children: 17yo, 12yo, 12yo, mother in law currently living with her   Socioeconomic factors: works PT for [employer]   Brother had WLS after she did   Friend had WLS last year    Nutrition Diagnosis  Diagnosis: Overweight / Obesity (NC-3.3), Excessive energy  "intake (NI-1.5), Inadequate protein intake (NI-5.7.3), and Altered GI function (NC-1.4)  Related to: Physical inactivity, Excessive energy intake, and Altered GI function  As Evidenced by: BMI >25, Expected anthropometric outcomes are not achieved, Excessive energy intake, and Unintentional weight gain     Nutrition Prescription: Recommend the following diet  1200 calories/ 65-75 grams protein per day     Interventions and Teaching   Discussed pre-op and post-op nutrition guidelines.       Patient educated and handouts provided.  Surgical changes to stomach / GI  Capacity of post-surgery stomach  Diet progression  Adequate hydration  Sugar and fat restriction to decrease \"dumping syndrome\"  Expected weight loss  Weight loss plateaus/ possibility of weight regain  Exercise  Nutrition considerations after surgery  Protein supplements  Meal planning and preparation  Appropriate carbohydrate, protein, and fat intake, and food/fluid choices to maximize safe weight loss, nutrient intake, and tolerance   Dietary and lifestyle changes  Possible problems with poor eating habits  Techniques for self monitoring and keeping daily food journal  Potential for food intolerance after surgery, and ways to deal with them including: lactose intolerance, nausea, reflux, vomiting, diarrhea, food intolerance, appetite changes, gas  Vitamin / Mineral supplementation of Multivitamin with minerals and Calcium  Currently taking:  Vitamin D 1000mg  Iron 65mg  Multivitamin- OTC gummy once daily  Stopped taking Vit B12  Ordered nutritional labs to assess if needs are being met     Patient is not currently pregnant and doesn't desire to become pregnant a minimum of one year post-op- pt had tubal ligation.     Education provided to: patient    Barriers to learning: No barriers identified    Readiness to change: preparation/ action     Comprehension: verbalizes understanding and needs reinforcement     Expected Compliance: " good    Recommendations  Pt is an appropriate candidate for surgery. Yes    Evaluation / Monitoring  Dietitian to Monitor: Eating pattern as discussed Tolerance of nutrition prescription Body weight Lab values Physical activity Bowel pattern  Patient  is taking her bariatric multivitamin and mineral supplement 3 days per week as it upsets her stomach and causes diarrhea. Will switch to iron free formula and take iron three days per week. She is drinking one protein shake per day .  Patient has been following the 30/30 rule. She has been drinking 48 ounces of fluid per day She is averaging 2000 steps per day , but has not been monitoring her steps     Goals  Eliminate sugar sweetened beverages, Food journal, Exercise 30 minutes 5 times per week, Complete lession plans 1-6, and Eat 3 meals per day  Food log 1200 calories per day 65-75 grams protein - try to be more consistent   Switch to iron free bariatric vitamin and mineral due to side effects ( diarrhea )- provided with samples of Procare, Celebrate & Fusion iron free   Take iron supplement 45-65 mg three times per week M W F   Continue with one protein shake and one calcium supplement to meet your needs ( pt has been cutting her calcium supplements due to size )   Track steps to > 4000 per day   Get lab work done prior to next appointment     Time Spent:   30 minutes

## 2024-07-11 NOTE — PATIENT INSTRUCTIONS
Get lab work done prior to next appointment   Track steps to > 4000 per day   Take a bariatric multivitamin and mineral ( iron free ) daily   Take iron 45 mg three days per week M W F  Food log 1200 calories per day

## 2024-07-12 ENCOUNTER — HOSPITAL ENCOUNTER (OUTPATIENT)
Dept: ULTRASOUND IMAGING | Facility: HOSPITAL | Age: 39
End: 2024-07-12
Payer: COMMERCIAL

## 2024-07-12 DIAGNOSIS — E04.1 THYROID NODULE: ICD-10-CM

## 2024-07-12 PROCEDURE — 76536 US EXAM OF HEAD AND NECK: CPT

## 2024-07-22 LAB
25(OH)D3+25(OH)D2 SERPL-MCNC: 24 NG/ML (ref 30–100)
ALBUMIN SERPL-MCNC: 3.8 G/DL (ref 3.5–5.7)
ALP SERPL-CCNC: 61 U/L (ref 35–120)
ALT SERPL-CCNC: 14 U/L
ANION GAP SERPL CALCULATED.3IONS-SCNC: 8 MMOL/L (ref 3–11)
AST SERPL-CCNC: 19 U/L
BASOPHILS # BLD AUTO: 0 THOU/CMM (ref 0–0.1)
BASOPHILS NFR BLD AUTO: 1 %
BILIRUB SERPL-MCNC: 0.3 MG/DL (ref 0.2–1)
BUN SERPL-MCNC: 7 MG/DL (ref 7–25)
CALCIUM SERPL-MCNC: 8.8 MG/DL (ref 8.5–10.1)
CHLORIDE SERPL-SCNC: 107 MMOL/L (ref 100–109)
CHOLEST SERPL-MCNC: 166 MG/DL
CHOLEST/HDLC SERPL: 2.4 {RATIO}
CO2 SERPL-SCNC: 28 MMOL/L (ref 21–31)
CREAT SERPL-MCNC: 0.57 MG/DL (ref 0.4–1.1)
CYTOLOGY CMNT CVX/VAG CYTO-IMP: NORMAL
DIFFERENTIAL METHOD BLD: NORMAL
EOSINOPHIL # BLD AUTO: 0.1 THOU/CMM (ref 0–0.5)
EOSINOPHIL NFR BLD AUTO: 1 %
ERYTHROCYTE [DISTWIDTH] IN BLOOD BY AUTOMATED COUNT: 15 % (ref 12–16)
FERRITIN SERPL-MCNC: 10 NG/ML (ref 11–306.8)
FOLATE SERPL-MCNC: 14.8 NG/ML
GFR/BSA.PRED SERPLBLD CYS-BASED-ARV: 120 ML/MIN/{1.73_M2}
GLUCOSE SERPL-MCNC: 79 MG/DL (ref 65–99)
HBV SURFACE AG SERPL QL IA: NONREACTIVE
HCT VFR BLD AUTO: 39.9 % (ref 35–43)
HDLC SERPL-MCNC: 70 MG/DL (ref 23–92)
HGB BLD-MCNC: 12.8 G/DL (ref 11.5–14.5)
LDLC SERPL CALC-MCNC: 76 MG/DL
LYMPHOCYTES # BLD AUTO: 1.4 THOU/CMM (ref 1–3)
LYMPHOCYTES NFR BLD AUTO: 27 %
MAGNESIUM SERPL-MCNC: 1.8 MG/DL (ref 1.4–2.2)
MCH RBC QN AUTO: 27.7 PG (ref 26–34)
MCHC RBC AUTO-ENTMCNC: 32.2 G/DL (ref 32–37)
MCV RBC AUTO: 86 FL (ref 80–100)
MONOCYTES # BLD AUTO: 0.3 THOU/CMM (ref 0.3–1)
MONOCYTES NFR BLD AUTO: 5 %
NEUTROPHILS # BLD AUTO: 3.4 THOU/CMM (ref 1.8–7.8)
NEUTROPHILS NFR BLD AUTO: 66 %
NONHDLC SERPL-MCNC: 96 MG/DL
PLATELET # BLD AUTO: 258 THOU/CMM (ref 140–350)
PMV BLD REES-ECKER: 10 FL (ref 7.5–11.3)
POTASSIUM SERPL-SCNC: 4.2 MMOL/L (ref 3.5–5.2)
PROT SERPL-MCNC: 7 G/DL (ref 6.3–8.3)
PTH-INTACT SERPL-MCNC: 61.1 PG/ML (ref 12–88)
RBC # BLD AUTO: 4.64 MILL/CMM (ref 3.7–4.7)
SODIUM SERPL-SCNC: 143 MMOL/L (ref 135–145)
T4 FREE SERPL-MCNC: 0.96 NG/DL (ref 0.61–1.12)
TRIGL SERPL-MCNC: 102 MG/DL
TSH SERPL-ACNC: 0.93 UIU/ML (ref 0.45–5.33)
VIT B12 SERPL-MCNC: 305 PG/ML (ref 180–914)
WBC # BLD AUTO: 5.2 THOU/CMM (ref 4–10)

## 2024-07-23 ENCOUNTER — TELEPHONE (OUTPATIENT)
Dept: FAMILY MEDICINE CLINIC | Facility: CLINIC | Age: 39
End: 2024-07-23

## 2024-07-23 DIAGNOSIS — E53.8 VITAMIN B12 DEFICIENCY: Primary | ICD-10-CM

## 2024-07-23 DIAGNOSIS — E55.9 VITAMIN D DEFICIENCY: ICD-10-CM

## 2024-07-23 DIAGNOSIS — R79.0 LOW FERRITIN: ICD-10-CM

## 2024-07-23 LAB
B BURGDOR AB SER QL IA: NEGATIVE INDEX
B BURGDOR AB SER-IMP: NORMAL
C TRACH RRNA SPEC QL NAA+PROBE: NOT DETECTED
N GONORRHOEA RRNA SPEC QL NAA+PROBE: NOT DETECTED
SPECIMEN SOURCE: NORMAL

## 2024-07-23 RX ORDER — ERGOCALCIFEROL 1.25 MG/1
50000 CAPSULE ORAL WEEKLY
Qty: 12 CAPSULE | Refills: 3 | Status: SHIPPED | OUTPATIENT
Start: 2024-07-23

## 2024-07-23 RX ORDER — LANOLIN ALCOHOL/MO/W.PET/CERES
1000 CREAM (GRAM) TOPICAL DAILY
Qty: 90 TABLET | Refills: 3 | Status: SHIPPED | OUTPATIENT
Start: 2024-07-23

## 2024-07-23 NOTE — TELEPHONE ENCOUNTER
----- Message from Navin Diallo MD sent at 7/23/2024  9:32 AM EDT -----  Hematology, Gi consults, and Vit b12 and vit D supplements  ----- Message -----  From: Katrina Portillo  Sent: 7/8/2024   9:57 AM EDT  To: Navin Diallo MD

## 2024-07-23 NOTE — TELEPHONE ENCOUNTER
Called and spoke to pt and is aware to consult with Gi and Hematology for low ferritin level. Is aware to take vit b12 and vit D supplements that was sent to pharmacy.

## 2024-07-24 ENCOUNTER — OFFICE VISIT (OUTPATIENT)
Dept: HEMATOLOGY ONCOLOGY | Facility: CLINIC | Age: 39
End: 2024-07-24
Payer: COMMERCIAL

## 2024-07-24 ENCOUNTER — TELEPHONE (OUTPATIENT)
Dept: HEMATOLOGY ONCOLOGY | Facility: CLINIC | Age: 39
End: 2024-07-24

## 2024-07-24 VITALS
RESPIRATION RATE: 16 BRPM | OXYGEN SATURATION: 97 % | BODY MASS INDEX: 38.99 KG/M2 | TEMPERATURE: 96.8 F | HEIGHT: 65 IN | SYSTOLIC BLOOD PRESSURE: 122 MMHG | DIASTOLIC BLOOD PRESSURE: 70 MMHG | HEART RATE: 79 BPM | WEIGHT: 234 LBS

## 2024-07-24 DIAGNOSIS — R79.0 LOW FERRITIN: ICD-10-CM

## 2024-07-24 DIAGNOSIS — E53.8 LOW SERUM VITAMIN B12: Primary | ICD-10-CM

## 2024-07-24 LAB — ZINC SERPL-MCNC: 103.6 UG/DL

## 2024-07-24 PROCEDURE — 99243 OFF/OP CNSLTJ NEW/EST LOW 30: CPT | Performed by: PHYSICIAN ASSISTANT

## 2024-07-24 RX ORDER — CYANOCOBALAMIN 1000 UG/ML
1000 INJECTION, SOLUTION INTRAMUSCULAR; SUBCUTANEOUS ONCE
OUTPATIENT
Start: 2024-08-06 | End: 2024-08-06

## 2024-07-24 RX ORDER — SODIUM CHLORIDE 9 MG/ML
20 INJECTION, SOLUTION INTRAVENOUS ONCE
OUTPATIENT
Start: 2024-08-06

## 2024-07-24 NOTE — PROGRESS NOTES
Hematology/Oncology Outpatient Consult  Steph Ceron 39 y.o. female 1985 [id number]    Date:  7/24/2024      Assessment and Plan:  1. Low ferritin 2. Low serum vitamin B12  Labs on 7/22/24   B12 305   Ferritin 10     Hgb is normal 12.8    She has sxs as below.    Recommend Venofer 200 mg x 5 doses; B12 1000 mcg x 5 doses     Patient is now established with bariatric medicine here at [facility].  Reviewed that they are managing these deficiencies of their patients for long-term management.  She will continue to follow with them in the future for infusion management and treatment.    Follow-up here as needed.    HPI:  39-year-old female presents for consult for low with ferritin and low B12 in the presence of history of bariatric surgery, gastric sleeve in 2017 in [place].    ROS: Review of Systems   Constitutional:  Positive for fatigue. Negative for appetite change, chills, fever and unexpected weight change.   HENT:  Negative for nosebleeds.    Respiratory:  Negative for cough and shortness of breath.    Cardiovascular:  Negative for chest pain, palpitations and leg swelling.   Gastrointestinal:  Negative for abdominal pain, constipation, diarrhea, nausea and vomiting.   Genitourinary:  Negative for difficulty urinating, dysuria, hematuria and menstrual problem (no menorhaggia).   Musculoskeletal:  Negative for arthralgias.   Skin: Negative.    Neurological:  Positive for dizziness. Negative for light-headedness, numbness and headaches.        RLS   Hematological: Negative.    Psychiatric/Behavioral: Negative.       Past Medical History:   Diagnosis Date    Abnormal Pap smear of cervix     Anemia     History of sleep apnea        Past Surgical History:   Procedure Laterality Date    APPENDECTOMY      BACK SURGERY  12/09/2022    BARIATRIC SURGERY      HYSTERECTOMY      MT COLPOSCOPY CERVIX VAG LOOP ELTRD BX CERVIX N/A 09/15/2020    Procedure: BIOPSY LEEP CERVIX;  Surgeon: Abhishek Roy MD;  Location:   MAIN OR;  Service: Gynecology    MO LAPAROSCOPY W/RMVL ADNEXAL STRUCTURES Bilateral 09/15/2020    Procedure: SALPINGECTOMY, LAPAROSCOPIC;  Surgeon: Abhishek Roy MD;  Location: [facility];  Service: Gynecology    MO REMOVAL INTRAUTERINE DEVICE IUD N/A 09/15/2020    Procedure: REMOVAL OF INTRAUTERINE DEVICE (IUD);  Surgeon: Abhishek Roy MD;  Location[facility];  Service: Gynecology    SPINE SURGERY  12/20/2022    UPPER GASTROINTESTINAL ENDOSCOPY  10/2023       Social History     Socioeconomic History    Marital status: /Civil Union     Spouse name: None    Number of children: 3    Years of education: None    Highest education level: Some college, no degree   Occupational History    Occupation: [employer]    Tobacco Use    Smoking status: Never     Passive exposure: Never    Smokeless tobacco: Never   Vaping Use    Vaping status: Never Used   Substance and Sexual Activity    Alcohol use: Yes     Comment: Ocassional only    Drug use: Never    Sexual activity: Yes     Partners: Male     Birth control/protection: Female Sterilization   Other Topics Concern    None   Social History Narrative    None     Social Determinants of Health     Financial Resource Strain: Low Risk  (5/28/2024)    Overall Financial Resource Strain (CARDIA)     Difficulty of Paying Living Expenses: Not hard at all   Recent Concern: Financial Resource Strain - High Risk (5/24/2024)    Overall Financial Resource Strain (CARDIA)     Difficulty of Paying Living Expenses: Very hard   Food Insecurity: No Food Insecurity (5/28/2024)    Hunger Vital Sign     Worried About Running Out of Food in the Last Year: Never true     Ran Out of Food in the Last Year: Never true   Recent Concern: Food Insecurity - Food Insecurity Present (5/24/2024)    Hunger Vital Sign     Worried About Running Out of Food in the Last Year: Sometimes true     Ran Out of Food in the Last Year: Often true   Transportation Needs: No Transportation Needs (5/28/2024)    PRAPARE -  Transportation     Lack of Transportation (Medical): No     Lack of Transportation (Non-Medical): No   Physical Activity: Sufficiently Active (6/11/2021)    Exercise Vital Sign     Days of Exercise per Week: 4 days     Minutes of Exercise per Session: 60 min   Stress: No Stress Concern Present (6/11/2021)    [employer] [employer] - Occupational Stress Questionnaire     Feeling of Stress : Not at all   Social Connections: Moderately Isolated (6/11/2021)    Social Connection and Isolation Panel [NHANES]     Frequency of Communication with Friends and Family: More than three times a week     Frequency of Social Gatherings with Friends and Family: Once a week     Attends Methodist Services: Never     Active Member of Clubs or Organizations: No     Attends Club or Organization Meetings: Never     Marital Status:    Intimate Partner Violence: Not At Risk (6/11/2021)    Humiliation, Afraid, Rape, and Kick questionnaire     Fear of Current or Ex-Partner: No     Emotionally Abused: No     Physically Abused: No     Sexually Abused: No   Housing Stability: Low Risk  (5/28/2024)    Housing Stability Vital Sign     Unable to Pay for Housing in the Last Year: No     Number of Times Moved in the Last Year: 1     Homeless in the Last Year: No   Recent Concern: Housing Stability - High Risk (5/24/2024)    Housing Stability Vital Sign     Unable to Pay for Housing in the Last Year: Yes     Number of Times Moved in the Last Year: Not on file     Homeless in the Last Year: No       Family History   Problem Relation Age of Onset    Hypertension Mother     Hypertension Father      No Known Allergies      Current Outpatient Medications:     ergocalciferol (VITAMIN D2) 50,000 units, Take 1 capsule (50,000 Units total) by mouth once a week, Disp: 12 capsule, Rfl: 3    Multiple Vitamin (multivitamin) capsule, Take 1 capsule by mouth daily, Disp: 100 capsule, Rfl: 6    vitamin B-12 (VITAMIN B-12) 1,000 mcg tablet,  "Take 1 tablet (1,000 mcg total) by mouth daily, Disp: 90 tablet, Rfl: 3    Physical Exam:  /70 (BP Location: Left arm, Patient Position: Sitting, Cuff Size: Adult)   Pulse 79   Temp (!) 96.8 °F (36 °C) (Temporal)   Resp 16   Ht 5' 5\" (1.651 m)   Wt 106 kg (234 lb)   SpO2 97%   BMI 38.94 kg/m²     Physical Exam  Vitals reviewed.   Constitutional:       General: She is not in acute distress.     Appearance: She is well-developed. She is obese. She is not ill-appearing.   HENT:      Head: Normocephalic and atraumatic.   Eyes:      General: No scleral icterus.     Conjunctiva/sclera: Conjunctivae normal.   Cardiovascular:      Rate and Rhythm: Normal rate and regular rhythm.      Heart sounds: Normal heart sounds. No murmur heard.  Pulmonary:      Effort: Pulmonary effort is normal. No respiratory distress.      Breath sounds: Normal breath sounds.   Abdominal:      Palpations: Abdomen is soft.      Tenderness: There is no abdominal tenderness.   Musculoskeletal:         General: No tenderness. Normal range of motion.      Cervical back: Normal range of motion and neck supple.      Right lower leg: No edema.      Left lower leg: No edema.   Lymphadenopathy:      Cervical: No cervical adenopathy.   Skin:     General: Skin is warm and dry.   Neurological:      Mental Status: She is alert and oriented to person, place, and time.      Cranial Nerves: No cranial nerve deficit.   Psychiatric:         Mood and Affect: Mood normal.         Behavior: Behavior normal.       Labs:  Lab Results   Component Value Date    WBC 5.2 07/22/2024    HGB 12.8 07/22/2024    HCT 39.9 07/22/2024    MCV 86 07/22/2024     07/22/2024     I have spent 30 minutes with Patient  today in which greater than 50% of this time was spent in counseling/coordination of care regarding Diagnostic results, Risks and benefits of tx options, Instructions for management, Impressions, Documenting in the medical record, Reviewing / ordering " tests, medicine, procedures  , and Obtaining or reviewing history  .     Patient voiced understanding and agreement in the above discussion. Aware to contact our office with questions/symptoms in the interim.     This note has been generated by voice recognition software system.  Therefore, there may be spelling, grammar, and or syntax errors. Please contact if questions arise.

## 2024-07-24 NOTE — LETTER
July 24, 2024     Navin Diallo MD  [address] [place] [id number]    Patient: Steph Ceron   YOB: 1985   Date of Visit: 7/24/2024       Dear Dr. Diallo:    Thank you for referring Steph Ceron to me for evaluation. Below are my notes for this consultation.    If you have questions, please do not hesitate to call me. I look forward to following your patient along with you.         Sincerely,        Katrina Breen PA-C        CC: VISHAL Saucedo PA-C  7/24/2024  4:27 PM  Sign when Signing Visit  Hematology/Oncology Outpatient Consult  Steph Ceron 39 y.o. female 1985 [id number]    Date:  7/24/2024      Assessment and Plan:  1. Low ferritin 2. Low serum vitamin B12  Labs on 7/22/24   B12 305   Ferritin 10     Hgb is normal 12.8    She has sxs as below.    Recommend Venofer 200 mg x 5 doses; B12 1000 mcg x 5 doses     Patient is now established with bariatric medicine here at [facility].  Reviewed that they are managing these deficiencies of their patients for long-term management.  She will continue to follow with them in the future for infusion management and treatment.    Follow-up here as needed.    HPI:  39-year-old female presents for consult for low with ferritin and low B12 in the presence of history of bariatric surgery, gastric sleeve in 2017 in [place].    ROS: Review of Systems   Constitutional:  Positive for fatigue. Negative for appetite change, chills, fever and unexpected weight change.   HENT:  Negative for nosebleeds.    Respiratory:  Negative for cough and shortness of breath.    Cardiovascular:  Negative for chest pain, palpitations and leg swelling.   Gastrointestinal:  Negative for abdominal pain, constipation, diarrhea, nausea and vomiting.   Genitourinary:  Negative for difficulty urinating, dysuria, hematuria and menstrual problem (no menorhaggia).   Musculoskeletal:  Negative for arthralgias.   Skin: Negative.     Neurological:  Positive for dizziness. Negative for light-headedness, numbness and headaches.        RLS   Hematological: Negative.    Psychiatric/Behavioral: Negative.       Past Medical History:   Diagnosis Date   • Abnormal Pap smear of cervix    • Anemia    • History of sleep apnea        Past Surgical History:   Procedure Laterality Date   • APPENDECTOMY     • BACK SURGERY  12/09/2022   • BARIATRIC SURGERY     • HYSTERECTOMY     • KS COLPOSCOPY CERVIX VAG LOOP ELTRD BX CERVIX N/A 09/15/2020    Procedure: BIOPSY LEEP CERVIX;  Surgeon: Abhishek Roy MD;  [facility];  Service: Gynecology   • KS LAPAROSCOPY W/RMVL ADNEXAL STRUCTURES Bilateral 09/15/2020    Procedure: SALPINGECTOMY, LAPAROSCOPIC;  Surgeon: Abhishek Roy MD;  [facility];  Service: Gynecology   • KS REMOVAL INTRAUTERINE DEVICE IUD N/A 09/15/2020    Procedure: REMOVAL OF INTRAUTERINE DEVICE (IUD);  Surgeon: Abhishek Roy MD;  [facility];  Service: Gynecology   • SPINE SURGERY  12/20/2022   • UPPER GASTROINTESTINAL ENDOSCOPY  10/2023       Social History     Socioeconomic History   • Marital status: /Civil Union     Spouse name: None   • Number of children: 3   • Years of education: None   • Highest education level: Some college, no degree   Occupational History   • Occupation: [employer]    Tobacco Use   • Smoking status: Never     Passive exposure: Never   • Smokeless tobacco: Never   Vaping Use   • Vaping status: Never Used   Substance and Sexual Activity   • Alcohol use: Yes     Comment: Ocassional only   • Drug use: Never   • Sexual activity: Yes     Partners: Male     Birth control/protection: Female Sterilization   Other Topics Concern   • None   Social History Narrative   • None     Social Determinants of Health     Financial Resource Strain: Low Risk  (5/28/2024)    Overall Financial Resource Strain (CARDIA)    • Difficulty of Paying Living Expenses: Not hard at all   Recent Concern: Financial Resource Strain -  High Risk (5/24/2024)    Overall Financial Resource Strain (CARDIA)    • Difficulty of Paying Living Expenses: Very hard   Food Insecurity: No Food Insecurity (5/28/2024)    Hunger Vital Sign    • Worried About Running Out of Food in the Last Year: Never true    • Ran Out of Food in the Last Year: Never true   Recent Concern: Food Insecurity - Food Insecurity Present (5/24/2024)    Hunger Vital Sign    • Worried About Running Out of Food in the Last Year: Sometimes true    • Ran Out of Food in the Last Year: Often true   Transportation Needs: No Transportation Needs (5/28/2024)    PRAPARE - Transportation    • Lack of Transportation (Medical): No    • Lack of Transportation (Non-Medical): No   Physical Activity: Sufficiently Active (6/11/2021)    Exercise Vital Sign    • Days of Exercise per Week: 4 days    • Minutes of Exercise per Session: 60 min   Stress: No Stress Concern Present (6/11/2021)    [employer] [place] [employer] - Occupational Stress Questionnaire    • Feeling of Stress : Not at all   Social Connections: Moderately Isolated (6/11/2021)    Social Connection and Isolation Panel [NHANES]    • Frequency of Communication with Friends and Family: More than three times a week    • Frequency of Social Gatherings with Friends and Family: Once a week    • Attends Christianity Services: Never    • Active Member of Clubs or Organizations: No    • Attends Club or Organization Meetings: Never    • Marital Status:    Intimate Partner Violence: Not At Risk (6/11/2021)    Humiliation, Afraid, Rape, and Kick questionnaire    • Fear of Current or Ex-Partner: No    • Emotionally Abused: No    • Physically Abused: No    • Sexually Abused: No   Housing Stability: Low Risk  (5/28/2024)    Housing Stability Vital Sign    • Unable to Pay for Housing in the Last Year: No    • Number of Times Moved in the Last Year: 1    • Homeless in the Last Year: No   Recent Concern: Housing Stability - High Risk (5/24/2024)  "   Housing Stability Vital Sign    • Unable to Pay for Housing in the Last Year: Yes    • Number of Times Moved in the Last Year: Not on file    • Homeless in the Last Year: No       Family History   Problem Relation Age of Onset   • Hypertension Mother    • Hypertension Father      No Known Allergies      Current Outpatient Medications:   •  ergocalciferol (VITAMIN D2) 50,000 units, Take 1 capsule (50,000 Units total) by mouth once a week, Disp: 12 capsule, Rfl: 3  •  Multiple Vitamin (multivitamin) capsule, Take 1 capsule by mouth daily, Disp: 100 capsule, Rfl: 6  •  vitamin B-12 (VITAMIN B-12) 1,000 mcg tablet, Take 1 tablet (1,000 mcg total) by mouth daily, Disp: 90 tablet, Rfl: 3    Physical Exam:  /70 (BP Location: Left arm, Patient Position: Sitting, Cuff Size: Adult)   Pulse 79   Temp (!) 96.8 °F (36 °C) (Temporal)   Resp 16   Ht 5' 5\" (1.651 m)   Wt 106 kg (234 lb)   SpO2 97%   BMI 38.94 kg/m²     Physical Exam  Vitals reviewed.   Constitutional:       General: She is not in acute distress.     Appearance: She is well-developed. She is obese. She is not ill-appearing.   HENT:      Head: Normocephalic and atraumatic.   Eyes:      General: No scleral icterus.     Conjunctiva/sclera: Conjunctivae normal.   Cardiovascular:      Rate and Rhythm: Normal rate and regular rhythm.      Heart sounds: Normal heart sounds. No murmur heard.  Pulmonary:      Effort: Pulmonary effort is normal. No respiratory distress.      Breath sounds: Normal breath sounds.   Abdominal:      Palpations: Abdomen is soft.      Tenderness: There is no abdominal tenderness.   Musculoskeletal:         General: No tenderness. Normal range of motion.      Cervical back: Normal range of motion and neck supple.      Right lower leg: No edema.      Left lower leg: No edema.   Lymphadenopathy:      Cervical: No cervical adenopathy.   Skin:     General: Skin is warm and dry.   Neurological:      Mental Status: She is alert and " oriented to person, place, and time.      Cranial Nerves: No cranial nerve deficit.   Psychiatric:         Mood and Affect: Mood normal.         Behavior: Behavior normal.       Labs:  Lab Results   Component Value Date    WBC 5.2 07/22/2024    HGB 12.8 07/22/2024    HCT 39.9 07/22/2024    MCV 86 07/22/2024     07/22/2024     I have spent 30 minutes with Patient  today in which greater than 50% of this time was spent in counseling/coordination of care regarding Diagnostic results, Risks and benefits of tx options, Instructions for management, Impressions, Documenting in the medical record, Reviewing / ordering tests, medicine, procedures  , and Obtaining or reviewing history  .     Patient voiced understanding and agreement in the above discussion. Aware to contact our office with questions/symptoms in the interim.     This note has been generated by voice recognition software system.  Therefore, there may be spelling, grammar, and or syntax errors. Please contact if questions arise.

## 2024-07-25 LAB
A-TOCOPHEROL VIT E SERPL-MCNC: 10.4 MG/L
BETA+GAMMA TOCOPHEROL SERPL-MCNC: 1.6 MG/L
RETINYL PALMITATE SERPL-MCNC: <0.02 MG/L
T GONDII AB SER-IMP: NORMAL
VIT A FREE SERPL-MCNC: 0.38 MG/L
VIT B1 BLD-SCNC: 116 NMOL/L
VIT B6 SERPL-MCNC: 31.5 NMOL/L

## 2024-07-29 ENCOUNTER — CLINICAL SUPPORT (OUTPATIENT)
Dept: OBGYN CLINIC | Facility: CLINIC | Age: 39
End: 2024-07-29

## 2024-07-29 VITALS
DIASTOLIC BLOOD PRESSURE: 82 MMHG | BODY MASS INDEX: 38.92 KG/M2 | SYSTOLIC BLOOD PRESSURE: 124 MMHG | WEIGHT: 233.6 LBS | HEART RATE: 83 BPM | HEIGHT: 65 IN

## 2024-07-29 DIAGNOSIS — Z23 NEED FOR HPV VACCINE: Primary | ICD-10-CM

## 2024-07-29 PROCEDURE — 90471 IMMUNIZATION ADMIN: CPT

## 2024-07-29 PROCEDURE — 90651 9VHPV VACCINE 2/3 DOSE IM: CPT

## 2024-07-29 NOTE — PROGRESS NOTES
Lambert rodas given 2nd HPV on left deltoid on 7/29/24  Vaccine report printed    NDC# 0993-9582-73  LOT# [id number]  EXP# [id number]

## 2024-07-29 NOTE — PROGRESS NOTES
HPV given to patient in left deltoid on 7/29/2024.     NDC: 5932-6864-33  LOT: [id number]  EXP: 01/22/2026

## 2024-08-06 ENCOUNTER — HOSPITAL ENCOUNTER (OUTPATIENT)
Dept: NON INVASIVE DIAGNOSTICS | Facility: HOSPITAL | Age: 39
Discharge: HOME | End: 2024-08-06
Payer: COMMERCIAL

## 2024-08-06 ENCOUNTER — HOSPITAL ENCOUNTER (OUTPATIENT)
Dept: INFUSION CENTER | Facility: HOSPITAL | Age: 39
Discharge: HOME | End: 2024-08-06
Attending: INTERNAL MEDICINE
Payer: COMMERCIAL

## 2024-08-06 VITALS
HEART RATE: 97 BPM | RESPIRATION RATE: 18 BRPM | DIASTOLIC BLOOD PRESSURE: 82 MMHG | TEMPERATURE: 97.5 F | SYSTOLIC BLOOD PRESSURE: 122 MMHG

## 2024-08-06 VITALS
DIASTOLIC BLOOD PRESSURE: 82 MMHG | HEIGHT: 65 IN | BODY MASS INDEX: 38.82 KG/M2 | WEIGHT: 233 LBS | SYSTOLIC BLOOD PRESSURE: 124 MMHG | HEART RATE: 85 BPM

## 2024-08-06 DIAGNOSIS — R42 DIZZINESS: ICD-10-CM

## 2024-08-06 DIAGNOSIS — R79.0 LOW FERRITIN: Primary | ICD-10-CM

## 2024-08-06 DIAGNOSIS — R94.31 ABNORMAL ECG: ICD-10-CM

## 2024-08-06 DIAGNOSIS — R53.83 OTHER FATIGUE: ICD-10-CM

## 2024-08-06 DIAGNOSIS — E53.8 LOW SERUM VITAMIN B12: ICD-10-CM

## 2024-08-06 LAB
AORTIC ROOT: 3.2 CM
AORTIC VALVE MEAN VELOCITY: 9.7 M/S
APICAL FOUR CHAMBER EJECTION FRACTION: 64 %
AV AREA BY CONTINUOUS VTI: 2.6 CM2
AV AREA PEAK VELOCITY: 2.1 CM2
AV LVOT MEAN GRADIENT: 3 MMHG
AV LVOT PEAK GRADIENT: 4 MMHG
AV MEAN GRADIENT: 4 MMHG
AV PEAK GRADIENT: 7 MMHG
AV VALVE AREA: 2.63 CM2
AV VELOCITY RATIO: 0.75
BSA FOR ECHO PROCEDURE: 2.11 M2
DOP CALC AO PEAK VEL: 1.34 M/S
DOP CALC AO VTI: 24.63 CM
DOP CALC LVOT AREA: 2.83 CM2
DOP CALC LVOT CARDIAC INDEX: 2.48 L/MIN/M2
DOP CALC LVOT CARDIAC OUTPUT: 5.24 L/MIN
DOP CALC LVOT DIAMETER: 1.9 CM
DOP CALC LVOT PEAK VEL VTI: 22.84 CM
DOP CALC LVOT PEAK VEL: 1 M/S
DOP CALC LVOT STROKE INDEX: 31.3 ML/M2
DOP CALC LVOT STROKE VOLUME: 64.73
E WAVE DECELERATION TIME: 163 MS
E/A RATIO: 1.44
FRACTIONAL SHORTENING: 34 (ref 28–44)
INTERVENTRICULAR SEPTUM IN DIASTOLE (PARASTERNAL SHORT AXIS VIEW): 0.9 CM
INTERVENTRICULAR SEPTUM: 0.9 CM (ref 0.6–1.1)
LAAS-AP2: 21.1 CM2
LAAS-AP4: 21.1 CM2
LEFT ATRIUM AREA SYSTOLE SINGLE PLANE A4C: 20.9 CM2
LEFT ATRIUM SIZE: 3.1 CM
LEFT ATRIUM VOLUME (MOD BIPLANE): 66 ML
LEFT ATRIUM VOLUME INDEX (MOD BIPLANE): 31.3 ML/M2
LEFT INTERNAL DIMENSION IN SYSTOLE: 3.1 CM (ref 2.1–4)
LEFT VENTRICULAR INTERNAL DIMENSION IN DIASTOLE: 4.7 CM (ref 3.5–6)
LEFT VENTRICULAR POSTERIOR WALL IN END DIASTOLE: 1 CM
LEFT VENTRICULAR STROKE VOLUME: 64 ML
LVSV (TEICH): 64 ML
MV E'TISSUE VEL-LAT: 19 CM/S
MV E'TISSUE VEL-SEP: 11 CM/S
MV PEAK A VEL: 0.61 M/S
MV PEAK E VEL: 88 CM/S
MV STENOSIS PRESSURE HALF TIME: 47 MS
MV VALVE AREA P 1/2 METHOD: 4.68
RIGHT ATRIUM AREA SYSTOLE A4C: 17.7 CM2
RIGHT VENTRICLE ID DIMENSION: 3.9 CM
SL CV LEFT ATRIUM LENGTH A2C: 4.8 CM
SL CV LV EF: 64
SL CV PED ECHO LEFT VENTRICLE DIASTOLIC VOLUME (MOD BIPLANE) 2D: 100 ML
SL CV PED ECHO LEFT VENTRICLE SYSTOLIC VOLUME (MOD BIPLANE) 2D: 37 ML
TR MAX PG: 9 MMHG
TR PEAK VELOCITY: 1.5 M/S
TRICUSPID ANNULAR PLANE SYSTOLIC EXCURSION: 2 CM
TRICUSPID VALVE PEAK REGURGITATION VELOCITY: 1.53 M/S

## 2024-08-06 PROCEDURE — 93225 XTRNL ECG REC<48 HRS REC: CPT

## 2024-08-06 PROCEDURE — 93306 TTE W/DOPPLER COMPLETE: CPT

## 2024-08-06 PROCEDURE — 96365 THER/PROPH/DIAG IV INF INIT: CPT

## 2024-08-06 PROCEDURE — 93306 TTE W/DOPPLER COMPLETE: CPT | Performed by: STUDENT IN AN ORGANIZED HEALTH CARE EDUCATION/TRAINING PROGRAM

## 2024-08-06 PROCEDURE — 93226 XTRNL ECG REC<48 HR SCAN A/R: CPT

## 2024-08-06 PROCEDURE — 96372 THER/PROPH/DIAG INJ SC/IM: CPT

## 2024-08-06 RX ORDER — CYANOCOBALAMIN 1000 UG/ML
1000 INJECTION, SOLUTION INTRAMUSCULAR; SUBCUTANEOUS ONCE
Status: COMPLETED | OUTPATIENT
Start: 2024-08-06 | End: 2024-08-06

## 2024-08-06 RX ORDER — SODIUM CHLORIDE 9 MG/ML
20 INJECTION, SOLUTION INTRAVENOUS ONCE
Status: CANCELLED | OUTPATIENT
Start: 2024-08-13

## 2024-08-06 RX ORDER — SODIUM CHLORIDE 9 MG/ML
20 INJECTION, SOLUTION INTRAVENOUS ONCE
Status: COMPLETED | OUTPATIENT
Start: 2024-08-06 | End: 2024-08-06

## 2024-08-06 RX ORDER — CYANOCOBALAMIN 1000 UG/ML
1000 INJECTION, SOLUTION INTRAMUSCULAR; SUBCUTANEOUS ONCE
Status: CANCELLED | OUTPATIENT
Start: 2024-08-13 | End: 2024-08-13

## 2024-08-06 RX ADMIN — CYANOCOBALAMIN 1000 MCG: 1000 INJECTION, SOLUTION INTRAMUSCULAR; SUBCUTANEOUS at 13:41

## 2024-08-06 RX ADMIN — IRON SUCROSE 200 MG: 20 INJECTION, SOLUTION INTRAVENOUS at 13:39

## 2024-08-06 RX ADMIN — SODIUM CHLORIDE 20 ML/HR: 9 INJECTION, SOLUTION INTRAVENOUS at 13:39

## 2024-08-06 NOTE — PLAN OF CARE
Problem: Potential for Falls  Goal: Patient will remain free of falls  Description: INTERVENTIONS:  - Educate patient/family on patient safety including physical limitations  - Instruct patient to call for assistance with activity   - Consult OT/PT to assist with strengthening/mobility   - Keep Call bell within reach  - Keep bed low and locked with side rails adjusted as   - Apply yellow socks and bracelet for high fall risk patients  - Consider moving patient to room near nurses station  Outcome: Progressing

## 2024-08-06 NOTE — PROGRESS NOTES
Patient tolerated R arm B12 injection and IV venofer without complications. Next appointment scheduled. AVS given.

## 2024-08-12 PROCEDURE — 93227 XTRNL ECG REC<48 HR R&I: CPT

## 2024-08-13 ENCOUNTER — TELEPHONE (OUTPATIENT)
Dept: FAMILY MEDICINE CLINIC | Facility: CLINIC | Age: 39
End: 2024-08-13

## 2024-08-13 ENCOUNTER — HOSPITAL ENCOUNTER (OUTPATIENT)
Dept: INFUSION CENTER | Facility: HOSPITAL | Age: 39
Discharge: HOME | End: 2024-08-13
Attending: INTERNAL MEDICINE
Payer: COMMERCIAL

## 2024-08-13 VITALS
HEART RATE: 92 BPM | DIASTOLIC BLOOD PRESSURE: 82 MMHG | SYSTOLIC BLOOD PRESSURE: 122 MMHG | RESPIRATION RATE: 20 BRPM | TEMPERATURE: 97.2 F

## 2024-08-13 DIAGNOSIS — R79.0 LOW FERRITIN: Primary | ICD-10-CM

## 2024-08-13 DIAGNOSIS — E53.8 LOW SERUM VITAMIN B12: ICD-10-CM

## 2024-08-13 PROCEDURE — 96365 THER/PROPH/DIAG IV INF INIT: CPT

## 2024-08-13 PROCEDURE — 96372 THER/PROPH/DIAG INJ SC/IM: CPT

## 2024-08-13 RX ORDER — CYANOCOBALAMIN 1000 UG/ML
1000 INJECTION, SOLUTION INTRAMUSCULAR; SUBCUTANEOUS ONCE
Status: COMPLETED | OUTPATIENT
Start: 2024-08-13 | End: 2024-08-13

## 2024-08-13 RX ORDER — SODIUM CHLORIDE 9 MG/ML
20 INJECTION, SOLUTION INTRAVENOUS ONCE
Status: COMPLETED | OUTPATIENT
Start: 2024-08-13 | End: 2024-08-13

## 2024-08-13 RX ORDER — CYANOCOBALAMIN 1000 UG/ML
1000 INJECTION, SOLUTION INTRAMUSCULAR; SUBCUTANEOUS ONCE
Status: CANCELLED | OUTPATIENT
Start: 2024-08-20 | End: 2024-08-20

## 2024-08-13 RX ORDER — SODIUM CHLORIDE 9 MG/ML
20 INJECTION, SOLUTION INTRAVENOUS ONCE
Status: CANCELLED | OUTPATIENT
Start: 2024-08-20

## 2024-08-13 RX ADMIN — SODIUM CHLORIDE 20 ML/HR: 9 INJECTION, SOLUTION INTRAVENOUS at 09:14

## 2024-08-13 RX ADMIN — CYANOCOBALAMIN 1000 MCG: 1000 INJECTION, SOLUTION INTRAMUSCULAR; SUBCUTANEOUS at 09:16

## 2024-08-13 RX ADMIN — IRON SUCROSE 200 MG: 20 INJECTION, SOLUTION INTRAVENOUS at 09:14

## 2024-08-13 NOTE — TELEPHONE ENCOUNTER
----- Message from Katrina ARANDA sent at 8/13/2024  9:11 AM EDT -----    ----- Message -----  From: Navin Diallo MD  Sent: 8/12/2024   5:04 PM EDT  To: Katrina Portillo MA; Haydee Henriquez MA    PVCs and PACs single , Nothing to worry about  ----- Message -----  From: Jonathan Garcia DO  Sent: 8/12/2024   3:41 PM EDT  To: Navin Diallo MD

## 2024-08-13 NOTE — PROGRESS NOTES
Patient tolerated IV venofer and L arm B12 injection without issue. Next appointment confirmed August 20th at [address] [place] [address]. AVS declined.

## 2024-08-14 ENCOUNTER — OFFICE VISIT (OUTPATIENT)
Dept: BARIATRICS | Facility: CLINIC | Age: 39
End: 2024-08-14
Payer: COMMERCIAL

## 2024-08-14 VITALS
RESPIRATION RATE: 16 BRPM | WEIGHT: 232.2 LBS | TEMPERATURE: 97.3 F | BODY MASS INDEX: 38.69 KG/M2 | HEIGHT: 65 IN | HEART RATE: 97 BPM | DIASTOLIC BLOOD PRESSURE: 91 MMHG | SYSTOLIC BLOOD PRESSURE: 119 MMHG

## 2024-08-14 DIAGNOSIS — K91.2 POSTSURGICAL MALABSORPTION: ICD-10-CM

## 2024-08-14 DIAGNOSIS — E66.9 OBESITY, CLASS II, BMI 35-39.9: Primary | ICD-10-CM

## 2024-08-14 PROCEDURE — 99214 OFFICE O/P EST MOD 30 MIN: CPT | Performed by: PHYSICIAN ASSISTANT

## 2024-08-14 NOTE — PROGRESS NOTES
Assessment/Plan:    Obesity, Class II, BMI 35-39.9  Currently in process for revision.  -status post laparoscopic sleeve gastrectomy by Dr. Alvarez in 2017.    -Screening labs and records reviewed from prior 7/22/24 and currenlty on vitamins and getting venofer    Goals:  -trying to food log and recommend doing it consistently  - To drink at least 64oz of water daily.No sugary beverages.will try to switch sugar to splenda  -recommend trying to increase walking    Discussed medication options and she will consider it.  Due to BP elevation today would avoid phentermine and contrave.    Hx of hysterectomy    Initial Weight:232.2          Postsurgical malabsorption  Currently taking vitamins and still receiving venofer      No follow-ups on file.    I      Diagnoses and all orders for this visit:    Obesity, Class II, BMI 35-39.9    Postsurgical malabsorption    Other orders  -     Calcium Carbonate-Vitamin D ([facility] CALCIUM/VIT D SOFT CHEWS PO); Take by mouth          Subjective:   Chief Complaint   Patient presents with    Consult     MWM- Post op; Wt gain; Consult; Gw 160lb; Waist 38.5in- patient has sleep apnea       Patient ID: Steph Ceron  is a 39 y.o. female with excess weight/obesity here to pursue weight management.    Past Medical History:   Diagnosis Date    Abnormal Pap smear of cervix     Anemia     History of sleep apnea        HPI: Here for MWM consult  status post laparoscopic sleeve gastrectomy by Dr. Alvarez in 2017.    In 2020 she changed jobs and started to gain weight.  She had to rush to eat and wasn't exercising.  In 2018 she also had a back injury and had to have surgery.   She does still get some back pain and after 20 minutes of walking .  She does eat slightly larger portions and feels her stomach pouch is larger than before    Obesity/Excess Weight:  Severity:  class II      Hydration:coffee with 2 scoops of sugar , 2-3 bottles water, rare 4 oz juice  Alcohol: rare  Exercise:2 x week 20 minutes    Occupation:not currently working  Dining out/takeout:rare    Diet Recall:  B: 2 eggs w/ 2 slices of bread  L:rice and meat and beans and vegetable  D: sometimes skipping and sometimes will eat green banana w/eggs or cereal or fruit    Sometimes a snack-fruit  The following portions of the patient's history were reviewed and updated as appropriate: She  has a past medical history of Abnormal Pap smear of cervix, Anemia, and History of sleep apnea.  She   Patient Active Problem List    Diagnosis Date Noted    Low ferritin 07/24/2024    Low serum vitamin B12 07/24/2024    Obesity, Class II, BMI 35-39.9 04/07/2023    Status post laparoscopic sleeve gastrectomy 04/07/2023    Postsurgical malabsorption 04/07/2023    Abnormal weight gain 04/07/2023    PONV (postoperative nausea and vomiting) 09/15/2020    High grade squamous intraepithelial lesion of cervix 08/20/2020    Encounter for sterilization 08/20/2020    Atypical squamous cell changes of undetermined significance (ASCUS) on vaginal cytology with positive high risk human papilloma virus (HPV) 06/22/2020     She  has a past surgical history that includes Appendectomy; Bariatric Surgery; pr laparoscopy w/rmvl adnexal structures (Bilateral, 09/15/2020); pr colposcopy cervix vag loop eltrd bx cervix (N/A, 09/15/2020); pr removal intrauterine device iud (N/A, 09/15/2020); Spine surgery (12/20/2022); Back surgery (12/09/2022); Hysterectomy; and Upper gastrointestinal endoscopy (10/2023).  Her family history includes Hypertension in her father and mother.  She  reports that she has never smoked. She has never been exposed to tobacco smoke. She has never used smokeless tobacco. She reports that she does not currently use alcohol. She reports that she does not use drugs.  Current Outpatient Medications   Medication Sig Dispense Refill    Calcium Carbonate-Vitamin D ([facility] CALCIUM/VIT D SOFT CHEWS PO) Take by mouth      Multiple Vitamin (multivitamin) capsule Take 1 capsule  "by mouth daily 100 capsule 6    ergocalciferol (VITAMIN D2) 50,000 units Take 1 capsule (50,000 Units total) by mouth once a week (Patient not taking: Reported on 8/14/2024) 12 capsule 3    vitamin B-12 (VITAMIN B-12) 1,000 mcg tablet Take 1 tablet (1,000 mcg total) by mouth daily (Patient not taking: Reported on 8/14/2024) 90 tablet 3     No current facility-administered medications for this visit.     Current Outpatient Medications on File Prior to Visit   Medication Sig    Calcium Carbonate-Vitamin D ([facility] CALCIUM/VIT D SOFT CHEWS PO) Take by mouth    Multiple Vitamin (multivitamin) capsule Take 1 capsule by mouth daily    ergocalciferol (VITAMIN D2) 50,000 units Take 1 capsule (50,000 Units total) by mouth once a week (Patient not taking: Reported on 8/14/2024)    vitamin B-12 (VITAMIN B-12) 1,000 mcg tablet Take 1 tablet (1,000 mcg total) by mouth daily (Patient not taking: Reported on 8/14/2024)     No current facility-administered medications on file prior to visit.     She has No Known Allergies..    Review of Systems   Constitutional:  Negative for fever.   Respiratory:  Negative for shortness of breath.    Cardiovascular:  Negative for chest pain and palpitations.   Gastrointestinal:  Negative for abdominal pain, constipation, diarrhea and vomiting.   Genitourinary:  Negative for difficulty urinating.   Musculoskeletal:  Negative for arthralgias and back pain.   Skin:  Negative for rash.   Neurological:  Negative for headaches.   Psychiatric/Behavioral:  Negative for dysphoric mood. The patient is not nervous/anxious.        Objective:    /91 (BP Location: Left arm, Patient Position: Sitting)   Pulse 97   Temp (!) 97.3 °F (36.3 °C) (Tympanic)   Resp 16   Ht 5' 5\" (1.651 m)   Wt 105 kg (232 lb 3.2 oz)   LMP 07/12/2024 (Approximate)   BMI 38.64 kg/m²     Physical Exam  Vitals and nursing note reviewed.   Constitutional:       General: She is not in acute distress.     Appearance: She is " well-developed. She is obese.   HENT:      Head: Normocephalic and atraumatic.   Eyes:      Conjunctiva/sclera: Conjunctivae normal.   Neck:      Thyroid: No thyromegaly.   Pulmonary:      Effort: Pulmonary effort is normal. No respiratory distress.   Skin:     Findings: No rash (visible).   Neurological:      Mental Status: She is alert and oriented to person, place, and time.   Psychiatric:         Mood and Affect: Mood normal.         Behavior: Behavior normal.

## 2024-08-14 NOTE — ASSESSMENT & PLAN NOTE
Currently in process for revision.  -status post laparoscopic sleeve gastrectomy by Dr. Alvarez in 2017.    -Screening labs and records reviewed from prior 7/22/24 and currenlty on vitamins and getting venofer    Goals:  -trying to food log and recommend doing it consistently  - To drink at least 64oz of water daily.No sugary beverages.will try to switch sugar to splenda  -recommend trying to increase walking    Discussed medication options and she will consider it.  Due to BP elevation today would avoid phentermine and contrave.    Hx of hysterectomy    Initial Weight:232.2

## 2024-08-14 NOTE — PATIENT INSTRUCTIONS
Adult Patient Counseling Points     Semaglutide   Warning   This drug has been shown to cause thyroid cancer in some animals. It is not known if this happens in humans. If thyroid cancer happens, it may be deadly if not found and treated early. Call your doctor right away if you have a neck mass, trouble breathing, trouble swallowing, or have hoarseness that will not go away.  Do not use this drug if you have a health problem called Multiple Endocrine Neoplasia syndrome type 2 (MEN 2), or if you or a family member have had thyroid cancer.  What is this drug used for?   Ozempic prefilled pens and Rybelsus tablets:   It is used to lower blood sugar in patients with type 2 diabetes.  Ozempic prefilled pens:   It is used to lower the chance of heart attack, stroke, and death in people with type 2 diabetes and heart disease.  Wegovy prefilled pens:   It is used to help with weight loss in certain people.  All drugs may cause side effects. However, many people have no side effects or only have minor side effects. Call your doctor or get medical help if any of these side effects or any other side effects bother you or do not go away:   If using for high blood sugar:   Constipation, diarrhea, stomach pain, upset stomach, or throwing up  Tablets:   Decreased appetite  If using for weight loss:   Constipation, diarrhea, stomach pain, upset stomach, or throwing up  Headache  Feeling dizzy, tired, or weak  Nose or throat irritation  Runny nose  Bloating  Burping  Gas  Heartburn  WARNING/CAUTION: Even though it may be rare, some people may have very bad and sometimes deadly side effects when taking a drug. Tell your doctor or get medical help right away if you have any of the following signs or symptoms that may be related to a very bad side effect:   For all uses of this drug:   Neck mass, trouble breathing, trouble swallowing, or have hoarseness that will not go away  Kidney problems like unable to pass urine, change in how  much urine is passed, blood in the urine, or a big weight gain  Gallbladder problems like pain in the upper right belly area, right shoulder area, or between the shoulder blades; change in stools; dark urine or yellow skin or eyes; or fever with chills  Severe dizziness or passing out  A fast heartbeat  Change in eyesight  Low blood sugar like dizziness, headache, feeling sleepy or weak, shaking, fast heartbeat, confusion, hunger, or sweating  Pancreas problems (pancreatitis) like severe stomach pain, severe back pain, or severe upset stomach or throwing up  Signs of an allergic reaction, like rash; hives; itching; red, swollen, blistered, or peeling skin with or without fever; wheezing; tightness in the chest or throat; trouble breathing, swallowing, or talking; unusual hoarseness; or swelling of the mouth, face, lips, tongue, or throat.  If using for weight loss:   New or worse behavior or mood changes like depression or thoughts of suicide  Note: This is not a comprehensive list of all side effects. Talk to your doctor if you have questions.  Consumer Information Use and Disclaimer: This information should not be used to decide whether or not to take this medicine or any other medicine. Only the healthcare provider has the knowledge and training to decide which medicines are right for a specific patient. This information does not endorse any medicine as safe, effective, or approved for treating any patient or health condition. This is only a limited summary of general information about the medicine's uses from the patient education leaflet and is not intended to be comprehensive. This limited summary does NOT include all information available about the possible uses, directions, warnings, precautions, interactions, adverse effects, or risks that may apply to this medicine. This information is not intended to provide medical advice, diagnosis or treatment and does not replace information you receive from the  "healthcare provider. For a more detailed summary of information about the risks and benefits of using this medicine, please speak with your healthcare provider and review the entire patient education leaflet.  Copyright   © 2024 [employer] and its affiliates and/or licensors. All rights reserved.  Last Reviewed Date   2024-04-19  Patient Education     Weight loss treatments   The Basics   Written by the doctors and editors at [employer]   How do I know if I am at a healthy weight? -- This depends on your height and your overall health.  Doctors use a special measure called \"body mass index,\" or \"BMI,\" to help understand a person's weight. Your weight and height are used to calculate your BMI (figure 1). Based on this number, you fall into 1 of the following categories:   Underweight - BMI under 18.5   Healthy weight - BMI between 18.5 and 24.9   Overweight - BMI between 25 and 29.9   Having obesity - BMI 30 or greater  Your doctor or nurse will often want to calculate your BMI at your medical appointments. But it's important to remember that your weight and BMI are just 1 piece of your overall health. Someone with a lower BMI might not be healthy overall, and someone with a higher BMI can still be healthy.  How does my weight affect my health? -- Having obesity increases the risks of many different health problems. It can also make it harder for you to move, breathe, and do other things that people who are at a healthy weight can do easily.  People with obesity are more likely to get diabetes, heart disease, cancer, and lots of other health problems. People with obesity also live less time than people of healthy weight. That's why it's important to try to keep your weight in a healthy range.  What should I do if I want to lose weight? -- If you would like to lose weight, you can start by talking to your doctor or nurse. They can help you make a plan to do this in a healthy way. They can also help you understand " "how many calories your body needs for energy. Losing weight takes work and can be hard, so it helps to have support.  The best weight loss plans help you have a healthy view of eating and exercise. With a good weight loss plan, many people can lose weight and keep it off. Reducing calories in your diet, burning calories through exercise, or both can help you lose weight:   Diet - There is no specific diet plan that works for everyone. Many \"trendy\" weight loss programs can end up being more harmful than helpful. Any diet that reduces the number of calories you eat can help you lose weight, as long as you stick with it. You should try to find an eating pattern that works for you.  It can also help to work with a dietitian (food expert). They can help you make healthy changes to your diet while making sure that you get the nutrients your body needs.   Movement - Even gentle forms of exercise are good for your health. You can walk, dance, garden, or even just move your arms while sitting. For weight loss, the important thing is to increase the number of calories you burn by moving more.  Combining a healthy diet with regular physical activity can help you get the best results. This is important even if your doctor recommends treatment for weight loss.  If you change your eating or exercise habits for a short time, you might lose weight. But you will regain the weight if you go back to your old habits. Weight loss is about changing your habits for the long term.  Can medicines help with weight loss? -- Sometimes. Prescription weight loss medicines work by reducing your appetite or by changing the way you digest food. Your doctor might recommend medicine if you have not been able to lose weight in other ways and you:   Have a BMI of 30 or greater, or   Have a BMI between 27 and 29.9 and also have weight-related medical problems, such as diabetes, heart disease, or high blood pressure  Doctors sometimes prescribe " "\"incretin-based\" medicines to help with weight loss. These were originally used in treating diabetes. But they can also help with weight loss in some people without diabetes. Examples include:   Tirzepatide (sample brand names: Zepbound, Mounjaro)   Semaglutide (sample brand names: Wegovy, Ozempic)   Liraglutide (sample brand name: Saxenda)  Currently, these medicines come as shots that go under the skin. They work by increasing the amount of insulin your body releases after eating. (Insulin is a hormone that helps control your blood sugar level.) They also slow digestion and make people feel full more quickly. These medicines can work well for weight loss. But they can also cause side effects, like nausea, vomiting, diarrhea, and constipation. And they can be expensive. Most people have to keep taking these medicines long term. If they stop the medicine, they often gain back the weight they lost.  There are other medicines that can be used for weight loss, too.  Medicines can be a helpful part of treatment for some people. But even if your doctor does prescribe medicine, they will also still recommend making lifestyle changes. This can help improve your overall health.  If you are interested in taking medicine to help you lose weight, talk with your doctor. They can help you understand your options.  Can I try herbal or non-prescription medicines to lose weight? -- Be careful about non-prescription (\"over-the-counter\") products advertised to help with weight loss:   Many herbal weight loss medicines do not work, and some are unsafe. Check with your doctor or pharmacist before you take any herbal weight loss medicines.   There is also an over-the-counter version of a prescription medicine called orlistat (brand name: Robert). It is probably safe to try, but it can cause unwanted side effects, such as cramps, burping, and gas.   Some weight loss medicines are sold over the internet. However, they can contain harmful " "ingredients and be unsafe.  How does weight loss surgery work? -- It works by making your stomach smaller. Some types of surgery also change the path that food takes through your digestive system. With these types, your body absorbs fewer calories and nutrients from food.  Weight loss surgery is not an option for everyone. If you are thinking about surgery, your doctor can talk to you about your situation and options. They can also talk to you about the risks and benefits of surgery.  How do I decide if weight loss treatment is right for me? -- If your doctor suggests weight loss treatment, ask these questions:   About how much weight can I expect to lose, and how long will that take? - This depends on the treatment. Some weight loss medicines start working quickly, often within a few months. For surgery, the amount of weight loss and how long it takes will depend on which procedure you have.   What are the risks of treatment for someone like me? - All weight loss medicines can have side effects. All weight loss surgeries can lead to infection, bleeding, the need for other operations, and even death. To reduce the risk of these problems with surgery, make sure that your surgeon is very experienced and that you are treated at a certified \"[facility].\"   What changes will I need to make to my diet and lifestyle? - Weight loss treatments are not \"shortcuts\" that you can take to avoid making lifestyle changes. People must also change how they eat and how active they are. No single weight loss treatment works on its own. Sometimes, people can get surgery only after they lose some weight on their own through diet and exercise first. Working with a dietitian can help.   Will I be able to process food normally? - Some types of weight loss surgeries leave people unable to get all of the nutrients that they need from food. People who have this problem must take vitamin and mineral supplements for the rest of their " lives.  All topics are updated as new evidence becomes available and our peer review process is complete.  This topic retrieved from [employer] on: Apr 20, 2024.  Topic 46104 Version 25.0  Release: 32.3.2 - C32.109  © 2024 UpToDate, Inc. and/or its affiliates. All rights reserved.  figure 1: Your body mass index (BMI)     Find your height (in feet and inches) in the top row. Then, find your weight (in pounds) in the first column. Now, find where the column for your height and the row for your weight meet. That is your BMI. For example, if you are 5-feet-9-inches tall and you weigh 260 pounds, your BMI is 38.  Graphic 39202 Version 4.0  Consumer Information Use and Disclaimer   Disclaimer: This generalized information is a limited summary of diagnosis, treatment, and/or medication information. It is not meant to be comprehensive and should be used as a tool to help the user understand and/or assess potential diagnostic and treatment options. It does NOT include all information about conditions, treatments, medications, side effects, or risks that may apply to a specific patient. It is not intended to be medical advice or a substitute for the medical advice, diagnosis, or treatment of a health care provider based on the health care provider's examination and assessment of a patient's specific and unique circumstances. Patients must speak with a health care provider for complete information about their health, medical questions, and treatment options, including any risks or benefits regarding use of medications. This information does not endorse any treatments or medications as safe, effective, or approved for treating a specific patient. UpToDate, Inc. and its affiliates disclaim any warranty or liability relating to this information or the use thereof.The use of this information is governed by the Terms of Use, available at https://www.wolterskluwer.com/en/know/clinical-effectiveness-terms. 2024© UpToDate, Inc. and its  affiliates and/or licensors. All rights reserved.  Copyright   © 2024 [employer] and/or its affiliates. All rights reserved.

## 2024-08-20 ENCOUNTER — OFFICE VISIT (OUTPATIENT)
Dept: FAMILY MEDICINE CLINIC | Facility: CLINIC | Age: 39
End: 2024-08-20
Payer: COMMERCIAL

## 2024-08-20 ENCOUNTER — HOSPITAL ENCOUNTER (OUTPATIENT)
Dept: INFUSION CENTER | Facility: HOSPITAL | Age: 39
Discharge: HOME | End: 2024-08-20
Attending: INTERNAL MEDICINE
Payer: COMMERCIAL

## 2024-08-20 VITALS
RESPIRATION RATE: 20 BRPM | SYSTOLIC BLOOD PRESSURE: 114 MMHG | DIASTOLIC BLOOD PRESSURE: 78 MMHG | HEART RATE: 82 BPM | TEMPERATURE: 97.4 F

## 2024-08-20 VITALS
BODY MASS INDEX: 38.49 KG/M2 | HEIGHT: 65 IN | TEMPERATURE: 97.6 F | OXYGEN SATURATION: 99 % | HEART RATE: 60 BPM | SYSTOLIC BLOOD PRESSURE: 124 MMHG | WEIGHT: 231 LBS | DIASTOLIC BLOOD PRESSURE: 80 MMHG | RESPIRATION RATE: 14 BRPM

## 2024-08-20 DIAGNOSIS — Z00.01 ENCOUNTER FOR GENERAL ADULT MEDICAL EXAMINATION WITH ABNORMAL FINDINGS: Primary | ICD-10-CM

## 2024-08-20 DIAGNOSIS — E04.1 THYROID NODULE: ICD-10-CM

## 2024-08-20 DIAGNOSIS — E53.8 LOW SERUM VITAMIN B12: ICD-10-CM

## 2024-08-20 DIAGNOSIS — R79.0 LOW FERRITIN: ICD-10-CM

## 2024-08-20 DIAGNOSIS — Z98.51: ICD-10-CM

## 2024-08-20 DIAGNOSIS — K21.9 GASTROESOPHAGEAL REFLUX DISEASE WITHOUT ESOPHAGITIS: ICD-10-CM

## 2024-08-20 DIAGNOSIS — R79.0 LOW FERRITIN: Primary | ICD-10-CM

## 2024-08-20 DIAGNOSIS — E55.9 VITAMIN D DEFICIENCY: ICD-10-CM

## 2024-08-20 DIAGNOSIS — E53.8 VITAMIN B12 DEFICIENCY: ICD-10-CM

## 2024-08-20 DIAGNOSIS — M54.16 LUMBAR RADICULOPATHY: ICD-10-CM

## 2024-08-20 PROCEDURE — 99395 PREV VISIT EST AGE 18-39: CPT | Performed by: INTERNAL MEDICINE

## 2024-08-20 PROCEDURE — 99214 OFFICE O/P EST MOD 30 MIN: CPT | Performed by: INTERNAL MEDICINE

## 2024-08-20 PROCEDURE — 96372 THER/PROPH/DIAG INJ SC/IM: CPT

## 2024-08-20 PROCEDURE — 96365 THER/PROPH/DIAG IV INF INIT: CPT

## 2024-08-20 RX ORDER — ERGOCALCIFEROL 1.25 MG/1
50000 CAPSULE, LIQUID FILLED ORAL WEEKLY
Qty: 12 CAPSULE | Refills: 3 | Status: SHIPPED | OUTPATIENT
Start: 2024-08-20

## 2024-08-20 RX ORDER — LANOLIN ALCOHOL/MO/W.PET/CERES
1000 CREAM (GRAM) TOPICAL DAILY
Qty: 90 TABLET | Refills: 3 | Status: SHIPPED | OUTPATIENT
Start: 2024-08-20

## 2024-08-20 RX ORDER — SODIUM CHLORIDE 9 MG/ML
20 INJECTION, SOLUTION INTRAVENOUS ONCE
Status: CANCELLED | OUTPATIENT
Start: 2024-08-27

## 2024-08-20 RX ORDER — CYANOCOBALAMIN 1000 UG/ML
1000 INJECTION, SOLUTION INTRAMUSCULAR; SUBCUTANEOUS ONCE
Status: COMPLETED | OUTPATIENT
Start: 2024-08-20 | End: 2024-08-20

## 2024-08-20 RX ORDER — GABAPENTIN 100 MG/1
100 CAPSULE ORAL 2 TIMES DAILY WITH MEALS
Qty: 60 CAPSULE | Refills: 2 | Status: SHIPPED | OUTPATIENT
Start: 2024-08-20

## 2024-08-20 RX ORDER — ETODOLAC 400 MG
400 TABLET ORAL 2 TIMES DAILY WITH MEALS
Qty: 60 TABLET | Refills: 1 | Status: SHIPPED | OUTPATIENT
Start: 2024-08-20 | End: 2024-08-26

## 2024-08-20 RX ORDER — SODIUM CHLORIDE 9 MG/ML
20 INJECTION, SOLUTION INTRAVENOUS ONCE
Status: COMPLETED | OUTPATIENT
Start: 2024-08-20 | End: 2024-08-20

## 2024-08-20 RX ORDER — CYANOCOBALAMIN 1000 UG/ML
1000 INJECTION, SOLUTION INTRAMUSCULAR; SUBCUTANEOUS ONCE
Status: CANCELLED | OUTPATIENT
Start: 2024-08-27 | End: 2024-08-27

## 2024-08-20 RX ADMIN — CYANOCOBALAMIN 1000 MCG: 1000 INJECTION, SOLUTION INTRAMUSCULAR at 09:18

## 2024-08-20 RX ADMIN — IRON SUCROSE 200 MG: 20 INJECTION, SOLUTION INTRAVENOUS at 09:28

## 2024-08-20 RX ADMIN — SODIUM CHLORIDE 20 ML/HR: 9 INJECTION, SOLUTION INTRAVENOUS at 09:28

## 2024-08-20 NOTE — PROGRESS NOTES
Ambulatory Visit  Name: Steph Ceron      : 1985      MRN: [id number]  Encounter Provider: Navin Diallo MD  Encounter Date: 2024   Encounter department: [facility]    Assessment & Plan   1. Encounter for general adult medical examination with abnormal findings  Comments:  done in Detail  Life style mod  RTC in  3mos w Blood work  2. Low ferritin  3. Vitamin D deficiency  -     ergocalciferol (VITAMIN D2) 50,000 units; Take 1 capsule (50,000 Units total) by mouth once a week  4. Gastroesophageal reflux disease without esophagitis  -     H. pylori antigen, stool; Future  -     H. pylori antigen, stool  5. Vitamin B12 deficiency  -     vitamin B-12 (VITAMIN B-12) 1,000 mcg tablet; Take 1 tablet (1,000 mcg total) by mouth daily  6. Thyroid nodule  7. Lumbar radiculopathy  -     Ambulatory referral to Spine & Pain Management; Future  -     XR spine lumbar minimum 4 views non injury; Future; Expected date: 2024  -     gabapentin (Neurontin) 100 mg capsule; Take 1 capsule (100 mg total) by mouth 2 (two) times a day with meals  -     etodolac (LODINE) 400 MG tablet; Take 1 tablet (400 mg total) by mouth 2 (two) times a day with meals  8. BMI 38.0-38.9,adult  -     Semaglutide-Weight Management (WEGOVY) 0.25 MG/0.5ML; Inject 0.5 mL (0.25 mg total) under the skin once a week  9. History of bilateral ligation of fallopian tubes  Annual Physical Exam : done in detail  Life style mod  RTC in 3 mos w  Blood work       History of Present Illness     39 Y O lady is here for Annual Physical exam she feels better, recent blood work and med list reviewed,...        Review of Systems   Constitutional:  Negative for chills, fatigue and fever.   HENT:  Negative for congestion, facial swelling, sore throat, trouble swallowing and voice change.    Eyes:  Negative for pain, discharge and visual disturbance.   Respiratory:  Negative for cough, shortness of breath and wheezing.   "  Cardiovascular:  Negative for chest pain, palpitations and leg swelling.   Gastrointestinal:  Negative for abdominal pain, blood in stool, constipation, diarrhea and nausea.   Endocrine: Negative for polydipsia, polyphagia and polyuria.   Genitourinary:  Negative for difficulty urinating, hematuria and urgency.   Musculoskeletal:  Negative for arthralgias and myalgias.   Skin:  Negative for rash.   Neurological:  Negative for dizziness, tremors, weakness and headaches.   Hematological:  Negative for adenopathy. Does not bruise/bleed easily.   Psychiatric/Behavioral:  Negative for dysphoric mood, sleep disturbance and suicidal ideas.        Objective     /80 (BP Location: Left arm, Patient Position: Sitting, Cuff Size: Standard)   Pulse 60   Temp 97.6 °F (36.4 °C) (Tympanic)   Resp 14   Ht 5' 5\" (1.651 m)   Wt 105 kg (231 lb)   LMP 07/12/2024 (Approximate)   SpO2 99%   BMI 38.44 kg/m²     Physical Exam  Vitals and nursing note reviewed.   Constitutional:       General: She is not in acute distress.     Appearance: She is well-developed. She is not diaphoretic.   HENT:      Head: Normocephalic and atraumatic.      Right Ear: External ear normal.      Left Ear: External ear normal.      Nose: Nose normal.   Eyes:      General:         Right eye: No discharge.         Left eye: No discharge.      Extraocular Movements: EOM normal.      Conjunctiva/sclera: Conjunctivae normal.      Pupils: Pupils are equal, round, and reactive to light.   Neck:      Thyroid: No thyromegaly.      Trachea: No tracheal deviation.   Cardiovascular:      Rate and Rhythm: Normal rate and regular rhythm.      Heart sounds: Normal heart sounds. No murmur heard.     No friction rub.   Pulmonary:      Effort: Pulmonary effort is normal. No respiratory distress.      Breath sounds: Normal breath sounds. No stridor. No wheezing or rales.   Abdominal:      General: Bowel sounds are normal. There is no distension.      Palpations: " Abdomen is soft.      Tenderness: There is no abdominal tenderness. There is no guarding.   Musculoskeletal:         General: No swelling, tenderness, deformity or edema. Normal range of motion.      Cervical back: Normal range of motion and neck supple.   Lymphadenopathy:      Cervical: No cervical adenopathy.   Skin:     General: Skin is warm and dry.      Capillary Refill: Capillary refill takes less than 2 seconds.      Coloration: Skin is not pale.      Findings: No erythema or rash.   Neurological:      Mental Status: She is alert and oriented to person, place, and time.      Cranial Nerves: No cranial nerve deficit.      Coordination: Coordination normal.   Psychiatric:         Mood and Affect: Mood and affect and mood normal.         Behavior: Behavior normal.       Administrative Statements

## 2024-08-20 NOTE — PROGRESS NOTES
Pt received B12 IM inj in LEFT deltoid & venofer infusion w/out any adverse effects. Offers no complaints. Confirmed next appt, declined AVS

## 2024-08-26 ENCOUNTER — TELEPHONE (OUTPATIENT)
Age: 39
End: 2024-08-26

## 2024-08-26 DIAGNOSIS — M54.16 LUMBAR RADICULOPATHY: Primary | ICD-10-CM

## 2024-08-26 NOTE — TELEPHONE ENCOUNTER
PA for WEGOVY 0.25 SUBMITTED     via    []CMM-KEY:   [x]Sameer-Case ID #   []Faxed to plan   []Other website   []Phone call Case ID #     Office notes sent, clinical questions answered. Awaiting determination    Turnaround time for your insurance to make a decision on your Prior Authorization can take 7-21 business days.

## 2024-08-26 NOTE — TELEPHONE ENCOUNTER
PA for etodolac (LODINE) 400 MG tablet DENIED    Reason:(Screenshot if applicable)        Message sent to office clinical pool Yes    Denial letter scanned into Media No Not Available     Appeal started No ( Provider will need to decide if appeal is warranted and send clinical documentation to Prior Authorization Team for initiation.)    **Please follow up with your patient regarding denial and next steps**

## 2024-08-26 NOTE — TELEPHONE ENCOUNTER
PA FOR ETODOLAC- SUBMITTED       via    []CMM-KEY:   [x]Sameer-Case ID #   []Faxed to plan   []Other website  []Phone call Case ID #     Office notes sent, clinical questions answered. Awaiting determination    Turnaround time for your insurance to make a decision on your Prior Authorization can take 7-21 business days.

## 2024-08-26 NOTE — TELEPHONE ENCOUNTER
Lodine was denied after prior auth was completed.  Some preferred medications are Celebrex, diclofenac, ibuprofen.  Please advise

## 2024-08-27 ENCOUNTER — HOSPITAL ENCOUNTER (OUTPATIENT)
Dept: INFUSION CENTER | Facility: HOSPITAL | Age: 39
Discharge: HOME | End: 2024-08-27
Attending: INTERNAL MEDICINE
Payer: COMMERCIAL

## 2024-08-27 VITALS
SYSTOLIC BLOOD PRESSURE: 117 MMHG | HEART RATE: 77 BPM | RESPIRATION RATE: 20 BRPM | TEMPERATURE: 97.7 F | DIASTOLIC BLOOD PRESSURE: 78 MMHG

## 2024-08-27 DIAGNOSIS — R79.0 LOW FERRITIN: Primary | ICD-10-CM

## 2024-08-27 DIAGNOSIS — E53.8 LOW SERUM VITAMIN B12: ICD-10-CM

## 2024-08-27 PROCEDURE — 96365 THER/PROPH/DIAG IV INF INIT: CPT

## 2024-08-27 PROCEDURE — 96372 THER/PROPH/DIAG INJ SC/IM: CPT

## 2024-08-27 RX ORDER — CYANOCOBALAMIN 1000 UG/ML
1000 INJECTION, SOLUTION INTRAMUSCULAR; SUBCUTANEOUS ONCE
Status: COMPLETED | OUTPATIENT
Start: 2024-08-27 | End: 2024-08-27

## 2024-08-27 RX ORDER — CYANOCOBALAMIN 1000 UG/ML
1000 INJECTION, SOLUTION INTRAMUSCULAR; SUBCUTANEOUS ONCE
OUTPATIENT
Start: 2024-09-03 | End: 2024-09-03

## 2024-08-27 RX ORDER — SODIUM CHLORIDE 9 MG/ML
20 INJECTION, SOLUTION INTRAVENOUS ONCE
Status: COMPLETED | OUTPATIENT
Start: 2024-08-27 | End: 2024-08-27

## 2024-08-27 RX ORDER — SODIUM CHLORIDE 9 MG/ML
20 INJECTION, SOLUTION INTRAVENOUS ONCE
OUTPATIENT
Start: 2024-09-03

## 2024-08-27 RX ADMIN — SODIUM CHLORIDE 20 ML/HR: 9 INJECTION, SOLUTION INTRAVENOUS at 08:58

## 2024-08-27 RX ADMIN — IRON SUCROSE 200 MG: 20 INJECTION, SOLUTION INTRAVENOUS at 08:58

## 2024-08-27 RX ADMIN — CYANOCOBALAMIN 1000 MCG: 1000 INJECTION, SOLUTION INTRAMUSCULAR at 08:46

## 2024-08-27 NOTE — TELEPHONE ENCOUNTER
PA for WEGOVY 0.25MG APPROVED     Date(s) approved 08/26/2024-02/26/2025    Case #    Patient advised by          [x] MyChart Message  [] Phone call   []LMOM  []L/M to call office as no active Communication consent on file  []Unable to leave detailed message as VM not approved on Communication consent       Pharmacy advised by    [x]Fax  []Phone call    Approval letter scanned into Media Yes

## 2024-08-27 NOTE — PROGRESS NOTES
Pt received venofer infusion & B12 IM inj in LEFT deltoid. Tolerated well, offers no complaints. Confirmed next appt, declined AVS

## 2024-09-03 ENCOUNTER — HOSPITAL ENCOUNTER (OUTPATIENT)
Dept: INFUSION CENTER | Facility: HOSPITAL | Age: 39
End: 2024-09-03
Attending: INTERNAL MEDICINE

## 2024-09-11 ENCOUNTER — CLINICAL SUPPORT (OUTPATIENT)
Dept: BARIATRICS | Facility: CLINIC | Age: 39
End: 2024-09-11

## 2024-09-11 VITALS — BODY MASS INDEX: 37.61 KG/M2 | WEIGHT: 226 LBS

## 2024-09-11 DIAGNOSIS — Z98.84 STATUS POST LAPAROSCOPIC SLEEVE GASTRECTOMY: Primary | ICD-10-CM

## 2024-09-11 PROCEDURE — RECHECK

## 2024-09-11 NOTE — PROGRESS NOTES
"Level 2 revision pathway   3 / 6 weight check  (English)    WEGOVY  0.25mg    Pt started the revision process but lost her job and insurance and was halted from the program  She now has insurance and is restarting the program.    Vertical sleeve gastrectomy with Dr Alvarez at [facility] in [place]    Surgery Date: 2017  Gained weight after multiple child births.    Was following with a dietitian and then was referred to bariatric program for her weight loss.  Entered bariatric program for about six months.    highest weight prior to surgery was around 235 lbs and lowest was 187 lbs about 1.5 years postop. She reports started regaining weight gradually around 2018 despite exercise.        Surgeon: Dr. Neel Clark  Today's weight: 226  #   PLAN:  sleeve to RYGB    Stated she just started Wegovy.  PCP provided the prescription.  Feels it is helping with her head hunger and self control.  Stated she is trying to control her eating habits and get \"back to basics.\"  Not sure she want to \"take a shot\" long term.  Staying hydrated.    Will following up with PCP.  Chronic back pain.  Limited movement.   Watching what she is eating.         "

## 2024-09-16 ENCOUNTER — TELEPHONE (OUTPATIENT)
Dept: BARIATRICS | Facility: CLINIC | Age: 39
End: 2024-09-16

## 2024-09-20 ENCOUNTER — CONSULT (OUTPATIENT)
Dept: PAIN MEDICINE | Facility: CLINIC | Age: 39
End: 2024-09-20
Payer: COMMERCIAL

## 2024-09-20 VITALS
SYSTOLIC BLOOD PRESSURE: 120 MMHG | DIASTOLIC BLOOD PRESSURE: 80 MMHG | HEIGHT: 65 IN | BODY MASS INDEX: 37.65 KG/M2 | WEIGHT: 226 LBS

## 2024-09-20 DIAGNOSIS — Z98.890 S/P LUMBAR LAMINECTOMY: ICD-10-CM

## 2024-09-20 DIAGNOSIS — M54.16 LUMBAR RADICULOPATHY: ICD-10-CM

## 2024-09-20 PROCEDURE — 99244 OFF/OP CNSLTJ NEW/EST MOD 40: CPT | Performed by: ANESTHESIOLOGY

## 2024-09-20 NOTE — PROGRESS NOTES
Assessment  1. Lumbar radiculopathy    2. S/P lumbar laminectomy      Patient presenting with chronic low back pain for greater than 1 year, worsening over the past several months. Pain is consistent with lumbar radicular pain accompanied by pain 9/10 on the pain scale with inability to participate in IADLs for >6 weeks. Patient has participated with physical therapy, chiropractic therapy as well as home exercises and stretches.  Patient has tried numerous medication classes as listed below with limited benefit.  Denies any bowel or bladder incontinence, saddle anesthesia.    In regards to the patient's pathology, we discussed the various treatment options including physical therapy, chiropractic treatment, medication management, activity modifications, interventional spine procedures.  Given that patient has not had any benefit with conservative treatments, I think patient would benefit from targeted interventional treatment modalities.    Independently reviewed and interpreted lumbar MRI-this showed a left-sided disc herniation at L4-5 left L4/5/S1 nerve root.    Since her MRI, the patient underwent lumbar spine surgery in 2022.  Initially her symptoms had resolved but now she is having right-sided radicular pain symptoms present for greater than 3 months.  She exhausted physical therapy treatments at [facility] with greater than 3 months of PT and has an ongoing home exercise program.    Plan:    Given recurring canal right sided and set of left-sided radicular leg pain symptoms after lumbar spine surgery 2 years ago and having failed greater than 3 months of physical therapy in 2023, recommended to obtain updated lumbar MRI at this time.    Will follow-up after MRI to discuss treatment plan and options including interventional options.    Discussed gabapentin trial, the patient would like to defer medication options due to her history of gastric bypass surgery.    Reviewed external notes from orthopedic  surgery, physical therapy, family medicine offices for review and interpretation of recent and prior relevant medical histories, treatment recommendations, medication and/or interventional treatment responses.    Reviewed renal function, CBC prior to recommending, initiating, continuing and/or adjusting medications.    Reviewed hemoglobin A1c, renal function, CBC and/or PT/INR prior to discussing/offering interventional modalities.    [place] Prescription Drug Monitoring Program report was reviewed and was appropriate     My impressions and treatment recommendations were discussed in detail with the patient who verbalized understanding and had no further questions.  Discharge instructions were provided. I personally saw and examined the patient and I agree with the above discussed plan of care.    Orders Placed This Encounter   Procedures    MRI lumbar spine wo contrast     Standing Status:   Future     Standing Expiration Date:   9/20/2028     Scheduling Instructions:      There is no preparation for this test. Please leave your jewelry and valuables at home, wedding rings are the exception. All patients will be required to change into a hospital gown and pants.  Street clothes are not permitted in the MRI.  Magnetic nail polish must be removed prior to arrival for your test. Please bring your insurance cards, a form of photo ID and a list of your medications with you. Arrive 15 minutes prior to your appointment time in order to register. Please bring any prior CT or MRI studies of this area that were not performed at a [facility].            To schedule this appointment, please contact Central Scheduling at [phone].            Prior to your appointment, please make sure you complete the MRI Screening Form when you e-Check in for your appointment. This will be available starting 7 days before your appointment in [facility]. You may receive an e-mail with an activation code if you do not have a  [employer] account. If you do not have access to a device, we will complete your screening at your appointment.     Order Specific Question:   Reason for Exam     Answer:   recurrent lumbar radiculopathy after spine surgery 2022     Order Specific Question:   Is the patient pregnant?     Answer:   No     Order Specific Question:   What is the patient's sedation requirement?     Answer:   No Sedation     Order Specific Question:   Does the patient need medication for Claustrophobia? If yes, order medication at this point.     Answer:   No     Order Specific Question:   Does the patient wear a life vest, have an implanted cardiac device, a stimulation device, a sleep apnea stimulator, or a breast tissue expansion device?     Answer:   No     Order Specific Question:   Release to patient through Mychart     Answer:   Immediate     No orders of the defined types were placed in this encounter.      History of Present Illness    Steph Ceron is a 39 y.o. female presenting for consultation (referred by Dr. Diallo) at [facility] for exam and evaluation of ongoing low back and radiating leg pain for greater than 1 year, worsening over the past several months. Pain started following a non work related injury 5/22/22. Over the past month, the intensity of pain has been Moderate to severe. Pain is currently 9/10. Pain does interfere with age appropriate activities of daily living. Pain is constant, with no typical pattern throughout the day. Pain is described as burning, sharp, pressure-like with pins/needles. Patient denies weakness in the lower extremities. Assistance device used: None.    Worsening factors noted: standing, bending, sitting, walking, exercise.   Improving factors noted: lying down, rest.    Treatments tried:   Heat/ice: yes  PT: yes  Chiropractic therapy: yes  Injections: yes   Previous spine surgery: yes    Anticoagulation: no    Medications tried:   Lidocaine patch, Voltaren  gel  Tylenol, NSAIDs  Flexeril  Gabapentin  Tramadol, Oxycodone    I have personally reviewed and/or updated the patient's past medical history, past surgical history, family history, social history, current medications, allergies, and vital signs today.     Review of Systems   Constitutional:  Positive for unexpected weight change (weight gain). Negative for chills and fever.   HENT:  Negative for ear pain and sore throat.    Eyes:  Negative for pain and visual disturbance.   Respiratory:  Negative for cough and shortness of breath.    Cardiovascular:  Negative for chest pain and palpitations.   Gastrointestinal:  Negative for abdominal pain and vomiting.   Genitourinary:  Negative for dysuria and hematuria.   Musculoskeletal:  Positive for arthralgias, back pain, gait problem and myalgias.   Skin:  Negative for color change and rash.   Neurological:  Positive for weakness and headaches. Negative for seizures and syncope.   All other systems reviewed and are negative.      Patient Active Problem List   Diagnosis    Atypical squamous cell changes of undetermined significance (ASCUS) on vaginal cytology with positive high risk human papilloma virus (HPV)    High grade squamous intraepithelial lesion of cervix    Encounter for sterilization    PONV (postoperative nausea and vomiting)    Obesity, Class II, BMI 35-39.9    Status post laparoscopic sleeve gastrectomy    Postsurgical malabsorption    Abnormal weight gain    Low ferritin    Low serum vitamin B12       Past Medical History:   Diagnosis Date    Abnormal Pap smear of cervix     Anemia     History of sleep apnea        Past Surgical History:   Procedure Laterality Date    APPENDECTOMY      BACK SURGERY  12/09/2022    BARIATRIC SURGERY      HYSTERECTOMY      VT COLPOSCOPY CERVIX VAG LOOP ELTRD BX CERVIX N/A 09/15/2020    Procedure: BIOPSY LEEP CERVIX;  Surgeon: Abhishek Roy MD;  Location: [facility];  Service: Gynecology    VT LAPAROSCOPY W/RMVL ADNEXAL  "STRUCTURES Bilateral 09/15/2020    Procedure: SALPINGECTOMY, LAPAROSCOPIC;  Surgeon: Abhishek Roy MD;  Location: [facility];  Service: Gynecology    CO REMOVAL INTRAUTERINE DEVICE IUD N/A 09/15/2020    Procedure: REMOVAL OF INTRAUTERINE DEVICE (IUD);  Surgeon: Abhishek Roy MD;  Location: [facility];  Service: Gynecology    SPINE SURGERY  12/20/2022    UPPER GASTROINTESTINAL ENDOSCOPY  10/2023       Family History   Problem Relation Age of Onset    Hypertension Mother     Hypertension Father        Social History     Occupational History    Occupation: [employer]    Tobacco Use    Smoking status: Never     Passive exposure: Never    Smokeless tobacco: Never   Vaping Use    Vaping status: Never Used   Substance and Sexual Activity    Alcohol use: Not Currently     Comment: Ocassional only    Drug use: Never    Sexual activity: Yes     Partners: Male     Birth control/protection: Female Sterilization       Current Outpatient Medications on File Prior to Visit   Medication Sig    diclofenac sodium (VOLTAREN) 50 mg EC tablet Take 1 tablet (50 mg total) by mouth 2 (two) times a day    ergocalciferol (VITAMIN D2) 50,000 units Take 1 capsule (50,000 Units total) by mouth once a week    Semaglutide-Weight Management (WEGOVY) 0.25 MG/0.5ML Inject 0.5 mL (0.25 mg total) under the skin once a week    vitamin B-12 (VITAMIN B-12) 1,000 mcg tablet Take 1 tablet (1,000 mcg total) by mouth daily    gabapentin (Neurontin) 100 mg capsule Take 1 capsule (100 mg total) by mouth 2 (two) times a day with meals (Patient not taking: Reported on 9/20/2024)     No current facility-administered medications on file prior to visit.       No Known Allergies    Physical Exam    /80   Ht 5' 5\" (1.651 m)   Wt 103 kg (226 lb)   BMI 37.61 kg/m²     Constitutional: normal, well developed, well nourished, alert, in no distress and non-toxic and no overt pain behavior.  Eyes: anicteric  HEENT: grossly intact  Neck: supple, symmetric, trachea " midline and no masses   Pulmonary:even and unlabored  Cardiovascular:No edema or pitting edema present  Skin:Normal without rashes or lesions and well hydrated  Psychiatric:Mood and affect appropriate  Neurologic: Motor function is grossly intact with no focal neurologic deficits   Musculoskeletal: positive right slr. Gait is nonantalgic    Imaging  MRI LUMBAR SPINE WITHOUT CONTRAST     INDICATION: M54.16: Radiculopathy, lumbar region.   Left radicular symptoms 6 weeks     COMPARISON:  None.     TECHNIQUE:  Sagittal T1, sagittal T2, sagittal inversion recovery, axial T1 and axial T2, coronal T2.    IMAGE QUALITY:  Diagnostic     FINDINGS:     VERTEBRAL BODIES:  There are 5 nonrib-bearing lumbar type vertebral bodies.  Asymmetric loss of disc height to the right the L4-L5 level.  Normal alignment of the lumbar spine.  No spondylolysis or spondylolisthesis. No scoliosis.  No compression   fracture.    Normal marrow signal is identified within the visualized bony structures.  No discrete marrow lesion.     SACRUM:  Normal signal within the sacrum. No evidence of insufficiency or stress fracture.     DISTAL CORD AND CONUS:  Normal size and signal within the distal cord and conus.  Conus terminates at T12.     PARASPINAL SOFT TISSUES:  Paraspinal soft tissues are unremarkable.     LOWER THORACIC DISC SPACES:  Normal disc height and signal.  No disc herniation, canal stenosis or foraminal narrowing.     LUMBAR DISC SPACES:     L1-L2:  Normal.     L2-L3:  Normal.     L3-L4: Normal     L4-L5::  Broad-based left posterolateral annular tear with the extrusion and descent of disc material within the narrow canal.  There may be fragmentation of discontiguous disc.  Mass effect upon the left L4-5 and possible left S1 nerve roots.  Exiting   L4 roots are unaffected.     L5-S1:  Thin broad-based posterior protrusion, not clearly compressive.     IMPRESSION:     Descending disc fragments to the left at the L4-L5 level, correlate  for left L5 and left S1 radiculitis.

## 2024-09-25 DIAGNOSIS — Z98.890 S/P LUMBAR LAMINECTOMY: ICD-10-CM

## 2024-09-25 DIAGNOSIS — M54.16 LUMBAR RADICULOPATHY: Primary | ICD-10-CM

## 2024-09-26 DIAGNOSIS — M54.16 LUMBAR RADICULOPATHY: ICD-10-CM

## 2024-10-23 ENCOUNTER — OFFICE VISIT (OUTPATIENT)
Dept: FAMILY MEDICINE CLINIC | Facility: CLINIC | Age: 39
End: 2024-10-23
Payer: COMMERCIAL

## 2024-10-23 VITALS
WEIGHT: 226 LBS | DIASTOLIC BLOOD PRESSURE: 76 MMHG | TEMPERATURE: 97.9 F | OXYGEN SATURATION: 99 % | HEIGHT: 65 IN | SYSTOLIC BLOOD PRESSURE: 122 MMHG | HEART RATE: 82 BPM | RESPIRATION RATE: 14 BRPM | BODY MASS INDEX: 37.65 KG/M2

## 2024-10-23 DIAGNOSIS — R79.0 LOW FERRITIN: ICD-10-CM

## 2024-10-23 DIAGNOSIS — M54.16 LUMBAR RADICULOPATHY: Primary | ICD-10-CM

## 2024-10-23 DIAGNOSIS — E04.1 THYROID NODULE: ICD-10-CM

## 2024-10-23 DIAGNOSIS — E53.8 VITAMIN B12 DEFICIENCY: ICD-10-CM

## 2024-10-23 DIAGNOSIS — E55.9 VITAMIN D DEFICIENCY: ICD-10-CM

## 2024-10-23 LAB — SL AMB POCT HEMOGLOBIN AIC: 5 (ref ?–6.5)

## 2024-10-23 PROCEDURE — 83036 HEMOGLOBIN GLYCOSYLATED A1C: CPT | Performed by: INTERNAL MEDICINE

## 2024-10-23 PROCEDURE — 99214 OFFICE O/P EST MOD 30 MIN: CPT | Performed by: INTERNAL MEDICINE

## 2024-10-23 RX ORDER — ERGOCALCIFEROL 1.25 MG/1
50000 CAPSULE, LIQUID FILLED ORAL WEEKLY
Qty: 12 CAPSULE | Refills: 3 | Status: SHIPPED | OUTPATIENT
Start: 2024-10-23

## 2024-10-23 RX ORDER — LANOLIN ALCOHOL/MO/W.PET/CERES
1000 CREAM (GRAM) TOPICAL DAILY
Qty: 90 TABLET | Refills: 3 | Status: SHIPPED | OUTPATIENT
Start: 2024-10-23

## 2024-10-23 NOTE — PROGRESS NOTES
Ambulatory Visit  Name: Steph Ceron      : 1985      MRN: [id number]  Encounter Provider: Navin Diallo MD  Encounter Date: 10/23/2024   Encounter department: [facility] CARE [facility]    Assessment & Plan  Vitamin B12 deficiency    Orders:    vitamin B-12 (VITAMIN B-12) 1,000 mcg tablet; Take 1 tablet (1,000 mcg total) by mouth daily    UA (URINE) with reflex to Scope; Future    Magnesium; Future    Vitamin B12; Future    Vitamin D 25 hydroxy; Future    TSH, 3rd generation; Future    T4, free; Future    Thyroid Antibodies Panel; Future    US thyroid; Future    UA (URINE) with reflex to Scope    Magnesium    Vitamin B12    Vitamin D 25 hydroxy    TSH, 3rd generation    T4, free    Vitamin D deficiency    Orders:    ergocalciferol (VITAMIN D2) 50,000 units; Take 1 capsule (50,000 Units total) by mouth once a week    UA (URINE) with reflex to Scope; Future    Magnesium; Future    Vitamin B12; Future    Vitamin D 25 hydroxy; Future    TSH, 3rd generation; Future    T4, free; Future    Thyroid Antibodies Panel; Future    US thyroid; Future    UA (URINE) with reflex to Scope    Magnesium    Vitamin B12    Vitamin D 25 hydroxy    TSH, 3rd generation    T4, free    Lumbar radiculopathy         Low ferritin    Orders:    CBC and differential; Future    Comprehensive metabolic panel; Future    Lipid panel; Future    CBC and differential    Comprehensive metabolic panel    Lipid panel    Thyroid nodule    Orders:    UA (URINE) with reflex to Scope; Future    Magnesium; Future    Vitamin B12; Future    Vitamin D 25 hydroxy; Future    TSH, 3rd generation; Future    T4, free; Future    Thyroid Antibodies Panel; Future    US thyroid; Future    UA (URINE) with reflex to Scope    Magnesium    Vitamin B12    Vitamin D 25 hydroxy    TSH, 3rd generation    T4, free    CBC and differential; Future    Comprehensive metabolic panel; Future    Lipid panel; Future    CBC and differential     "Comprehensive metabolic panel    Lipid panel    BMI 37.0-37.9, adult  Increase;  Orders:    Semaglutide-Weight Management (WEGOVY) 0.5 MG/0.5ML; Inject 0.5 mL (0.5 mg total) under the skin once a week    POCT hemoglobin A1c    Life style Mod  RTC in 2-3 mos w Blood work       History of Present Illness     39 Y O lady is here for Regular check Up, she has few symptoms, recent blood work and med list reviewed,...          Review of Systems   Constitutional:  Positive for fatigue. Negative for chills and fever.   HENT:  Positive for postnasal drip. Negative for congestion, facial swelling, sore throat, trouble swallowing and voice change.    Eyes:  Negative for pain, discharge and visual disturbance.   Respiratory:  Negative for cough, shortness of breath and wheezing.    Cardiovascular:  Negative for chest pain, palpitations and leg swelling.   Gastrointestinal:  Negative for abdominal pain, blood in stool, constipation, diarrhea and nausea.   Endocrine: Negative for polydipsia, polyphagia and polyuria.   Genitourinary:  Negative for difficulty urinating, hematuria and urgency.   Musculoskeletal:  Positive for back pain. Negative for arthralgias and myalgias.   Skin:  Negative for rash.   Neurological:  Positive for numbness. Negative for dizziness, tremors, weakness and headaches.   Hematological:  Negative for adenopathy. Does not bruise/bleed easily.   Psychiatric/Behavioral:  Negative for dysphoric mood, sleep disturbance and suicidal ideas.            Objective     /76 (BP Location: Left arm, Patient Position: Sitting, Cuff Size: Standard)   Pulse 82   Temp 97.9 °F (36.6 °C) (Tympanic)   Resp 14   Ht 5' 5\" (1.651 m)   Wt 103 kg (226 lb)   SpO2 99%   BMI 37.61 kg/m²     Physical Exam  Constitutional:       General: She is not in acute distress.     Appearance: She is well-developed. She is not diaphoretic.   HENT:      Head: Normocephalic.      Right Ear: External ear normal.      Left Ear: External " ear normal.      Nose: Nose normal.   Eyes:      General:         Right eye: No discharge.         Left eye: No discharge.      Conjunctiva/sclera: Conjunctivae normal.      Pupils: Pupils are equal, round, and reactive to light.   Neck:      Thyroid: No thyromegaly.      Trachea: No tracheal deviation.   Cardiovascular:      Rate and Rhythm: Normal rate and regular rhythm.      Heart sounds: Normal heart sounds. No murmur heard.     No friction rub.   Pulmonary:      Effort: Pulmonary effort is normal. No respiratory distress.      Breath sounds: Normal breath sounds. No stridor. No wheezing or rales.   Abdominal:      General: Bowel sounds are normal. There is no distension.      Palpations: Abdomen is soft.      Tenderness: There is no abdominal tenderness. There is no guarding.   Musculoskeletal:         General: Tenderness present. No deformity. Normal range of motion.      Cervical back: Normal range of motion and neck supple.   Lymphadenopathy:      Cervical: No cervical adenopathy.   Skin:     General: Skin is warm.      Coloration: Skin is not pale.      Findings: No erythema or rash.   Neurological:      Mental Status: She is alert and oriented to person, place, and time.      Cranial Nerves: No cranial nerve deficit.      Sensory: Sensory deficit present.      Coordination: Coordination normal.   Psychiatric:         Behavior: Behavior normal.

## 2024-10-28 NOTE — TELEPHONE ENCOUNTER
Patient called in regards to the pharmacy informing her that they never received the new script for the wegovy and would like provider to resend over to pharmacy.

## 2024-10-30 ENCOUNTER — TELEPHONE (OUTPATIENT)
Age: 39
End: 2024-10-30

## 2024-10-30 NOTE — TELEPHONE ENCOUNTER
Patient called in to reschedule appt she had canceled on 10/7 with ellyn, patient can be reached at [phone]

## 2024-10-31 ENCOUNTER — CLINICAL SUPPORT (OUTPATIENT)
Dept: BARIATRICS | Facility: CLINIC | Age: 39
End: 2024-10-31

## 2024-10-31 VITALS — WEIGHT: 222.3 LBS | BODY MASS INDEX: 36.99 KG/M2

## 2024-10-31 DIAGNOSIS — E66.812 OBESITY, CLASS II, BMI 35-39.9: ICD-10-CM

## 2024-10-31 DIAGNOSIS — Z01.818 PRE-OPERATIVE CLEARANCE: Primary | ICD-10-CM

## 2024-10-31 DIAGNOSIS — K91.2 POSTSURGICAL MALABSORPTION: ICD-10-CM

## 2024-10-31 DIAGNOSIS — E53.8 LOW SERUM VITAMIN B12: ICD-10-CM

## 2024-10-31 DIAGNOSIS — R79.0 LOW FERRITIN: ICD-10-CM

## 2024-10-31 DIAGNOSIS — Z98.84 STATUS POST LAPAROSCOPIC SLEEVE GASTRECTOMY: ICD-10-CM

## 2024-10-31 DIAGNOSIS — R63.5 ABNORMAL WEIGHT GAIN: ICD-10-CM

## 2024-10-31 PROCEDURE — RECHECK: Performed by: DIETITIAN, REGISTERED

## 2024-10-31 NOTE — PROGRESS NOTES
"Bariatric Nutrition Assessment Note  \"Patient was initially planning to undergo a gastric bypass in [place] but eventually had a sleeve gastrectomy. She lost about 50lbs following her surgery but has regained most of her weight back. She is now interested in additional options for weight loss. Discussed with patient regarding 6 months referral to medical weight loss and reassessment to see if she would qualify for a revision  Level 2 revisional pathway\"    Pt started the revision process but lost her job and insurance and was halted from the program  She now has insurance and is restarting the program.     Type of surgery    Vertical sleeve gastrectomy with Dr Alvarez at [facility] in [place], [place]  Surgery Date: 7/31/2017  6 years post-op  Surgeon: Dr. Neel Clark  Pre op for possible revision     Nutrition Assessment   Steph Ceron  39 y.o.  female   Wt 101 kg (222 lb 4.8 oz)   BMI 36.99 kg/m²    -2# x 1 month   -9.2# x 5 months     Wt Readings from Last 3 Encounters:   10/23/24 103 kg (226 lb)   10/15/24 103 kg (226 lb)   09/20/24 103 kg (226 lb)        Emily Estrella Equation:  IMR=5802gyye/day  Weight Maintenance= 2072kcal/day  Estimated calories for weight loss 1072-1572kcal/day (1-2# per wk wt loss - sedentary)  Estimated protein needs 68.3-102.4g/day (1.0-1.5 gms/kg IBW)  Estimated fluid needs 2049-2391ml/day (30-35 ml/kg IBW)    Weight on Day of Weight Loss Surgery: 235lbs  Weight in (lb) to have BMI = 25: 150.25lbs  Pre-Op Excess Wt: 84.75lbs  Post-op thien= 181lbs  47lb weight regain  Post-Op Wt Loss: 7#/ 8.3% EBWL in 4 year(s)    Maximum Wt Lost:  highest weight prior to surgery was around 235 lbs and lowest was 181 lbs about 1.5 years postop. She reports started regaining weight gradually around 2018 despite exercise. In 2020 she started gaining more significant amount of weight.     Pt was prescribed Wegovy by her PCP. She started to take medication but it was frequently out of stock.  When she " couldn't get her prescription filled , she would get overly hungry.  Decided to stop medication due to shortages and work towards revisional surgery. ( Sleeve to bypass )     Review of History and Medications   Past Medical History:   Diagnosis Date    Abnormal Pap smear of cervix     Anemia     History of sleep apnea      Past Surgical History:   Procedure Laterality Date    APPENDECTOMY      BACK SURGERY  12/09/2022    BARIATRIC SURGERY      HYSTERECTOMY      ID COLPOSCOPY CERVIX VAG LOOP ELTRD BX CERVIX N/A 09/15/2020    Procedure: BIOPSY LEEP CERVIX;  Surgeon: Abhishek Roy MD;  Location: [facility];  Service: Gynecology    ID LAPAROSCOPY W/RMVL ADNEXAL STRUCTURES Bilateral 09/15/2020    Procedure: SALPINGECTOMY, LAPAROSCOPIC;  Surgeon: Abhishek Roy MD;  Location: [facility];  Service: Gynecology    ID REMOVAL INTRAUTERINE DEVICE IUD N/A 09/15/2020    Procedure: REMOVAL OF INTRAUTERINE DEVICE (IUD);  Surgeon: Abhishek Roy MD;  Location: [facility];  Service: Gynecology    SPINE SURGERY  12/20/2022    UPPER GASTROINTESTINAL ENDOSCOPY  10/2023     Social History     Socioeconomic History    Marital status: /Civil Union     Spouse name: Not on file    Number of children: 3    Years of education: Not on file    Highest education level: Some college, no degree   Occupational History    Occupation: [employer]    Tobacco Use    Smoking status: Never     Passive exposure: Never    Smokeless tobacco: Never   Vaping Use    Vaping status: Never Used   Substance and Sexual Activity    Alcohol use: Not Currently     Comment: Ocassional only    Drug use: Never    Sexual activity: Yes     Partners: Male     Birth control/protection: Female Sterilization   Other Topics Concern    Not on file   Social History Narrative    Not on file     Social Determinants of Health     Financial Resource Strain: Low Risk  (7/29/2024)    Overall Financial Resource Strain (CARDIA)     Difficulty of Paying Living Expenses: Not hard at all    Recent Concern: Financial Resource Strain - High Risk (5/24/2024)    Overall Financial Resource Strain (CARDIA)     Difficulty of Paying Living Expenses: Very hard   Food Insecurity: No Food Insecurity (7/29/2024)    Nursing - Inadequate Food Risk Classification     Worried About Running Out of Food in the Last Year: Never true     Ran Out of Food in the Last Year: Never true     Ran Out of Food in the Last Year: Not on file   Recent Concern: Food Insecurity - Food Insecurity Present (5/24/2024)    Hunger Vital Sign     Worried About Running Out of Food in the Last Year: Sometimes true     Ran Out of Food in the Last Year: Often true   Transportation Needs: No Transportation Needs (7/29/2024)    PRAPARE - Transportation     Lack of Transportation (Medical): No     Lack of Transportation (Non-Medical): No   Physical Activity: Sufficiently Active (6/11/2021)    Exercise Vital Sign     Days of Exercise per Week: 4 days     Minutes of Exercise per Session: 60 min   Stress: No Stress Concern Present (6/11/2021)    [employer] - Occupational Stress Questionnaire     Feeling of Stress : Not at all   Social Connections: Moderately Isolated (6/11/2021)    Social Connection and Isolation Panel [NHANES]     Frequency of Communication with Friends and Family: More than three times a week     Frequency of Social Gatherings with Friends and Family: Once a week     Attends Protestant Services: Never     Active Member of Clubs or Organizations: No     Attends Club or Organization Meetings: Never     Marital Status:    Intimate Partner Violence: Not At Risk (6/11/2021)    Humiliation, Afraid, Rape, and Kick questionnaire     Fear of Current or Ex-Partner: No     Emotionally Abused: No     Physically Abused: No     Sexually Abused: No   Housing Stability: Low Risk  (7/29/2024)    Housing Stability Vital Sign     Unable to Pay for Housing in the Last Year: No     Number of Times Moved in the Last  Year: 0     Homeless in the Last Year: No   Recent Concern: Housing Stability - High Risk (5/24/2024)    Housing Stability Vital Sign     Unable to Pay for Housing in the Last Year: Yes     Number of Times Moved in the Last Year: Not on file     Homeless in the Last Year: No       Current Outpatient Medications:     ergocalciferol (VITAMIN D2) 50,000 units, Take 1 capsule (50,000 Units total) by mouth once a week, Disp: 12 capsule, Rfl: 3    gabapentin (Neurontin) 100 mg capsule, Take 1 capsule (100 mg total) by mouth 2 (two) times a day with meals (Patient not taking: Reported on 9/20/2024), Disp: 60 capsule, Rfl: 2    Semaglutide-Weight Management (WEGOVY) 0.5 MG/0.5ML, Inject 0.5 mL (0.5 mg total) under the skin once a week, Disp: 2 mL, Rfl: 0    vitamin B-12 (VITAMIN B-12) 1,000 mcg tablet, Take 1 tablet (1,000 mcg total) by mouth daily, Disp: 90 tablet, Rfl: 3    Food Intake and Lifestyle Assessment   Food Intake Assessment completed via usual diet recall  Takes her daughter to school   Wakes 7:30 am  4oz coffee with 2 spoons sugar  Breakfast: 8:30am: boiled egg with 2 slices of wheat bread   Snack: usually not.    Lunch: 1 pm: beans &  rice, meat: mostly chicken, sometimes beef or pork or shrimp, salad, plantains  Trying to eat more vegetables instead of rice or will eat her beans first   Snack : 0   Dinner : 5pm: protein shake with fruit  or cereal and fruit   Snack: sometimes bread if hungry , not usually   When mother in law lives with her she eats dinner   Otherwise will have a snack instead   Beverage intake: water, apple juice, and coffee  Protein supplement: Once per day - Gold Standard Whey protein mixed milk (1%)   Estimated protein intake per day: 60-70 g protein   Estimated fluid intake per day:  two to three 16-oz connell, 8 oz coffee, 4-8 oz juice  Meals eaten away from home: weekends: once per week will get pizza   Typical meal pattern: 2 meals per day and 1 snacks per day  Eating Behaviors:  "Consumption of high calorie beverages and skips dinner when mother in law not with her    Food allergies or intolerances: No Known Allergies NKFA  Cultural or Methodist considerations:     Physical Assessment  Physical Activity  Types of exercise: Some walking.   Goes to the gym - 2-3 times per week for 25 minutes - does recumbant bike or treadmill   Can only do light lifting. - has stopped due to back pain and only walks   Current physical limitations: back injury May 2022 back surgery December 2022.  Did 3 months of PT  Had surgery on her back for bulging disk last year, has some limitations for due to the surgery     Psychosocial Assessment   Support systems: lives with spouse and children: 17yo, 14yo, 10yo, mother in law currently living with her   Socioeconomic factors: works PT for [employer]   Brother had WLS after she did   Friend had WLS last year    Nutrition Diagnosis- continued   Diagnosis: Overweight / Obesity (NC-3.3), Excessive energy intake (NI-1.5), Inadequate protein intake (NI-5.7.3), and Altered GI function (NC-1.4)  Related to: Physical inactivity, Excessive energy intake, and Altered GI function  As Evidenced by: BMI >25, Expected anthropometric outcomes are not achieved, Excessive energy intake, and Unintentional weight gain     Nutrition Prescription: Recommend the following diet  1200 calories/ 65-75 grams protein per day     Interventions and Teaching   Discussed pre-op and post-op nutrition guidelines.       Patient educated and handouts provided.  Surgical changes to stomach / GI  Capacity of post-surgery stomach  Diet progression  Adequate hydration  Sugar and fat restriction to decrease \"dumping syndrome\"  Expected weight loss  Weight loss plateaus/ possibility of weight regain  Exercise  Nutrition considerations after surgery  Protein supplements  Meal planning and preparation  Appropriate carbohydrate, protein, and fat intake, and food/fluid choices to maximize safe weight " loss, nutrient intake, and tolerance   Dietary and lifestyle changes  Possible problems with poor eating habits  Techniques for self monitoring and keeping daily food journal  Potential for food intolerance after surgery, and ways to deal with them including: lactose intolerance, nausea, reflux, vomiting, diarrhea, food intolerance, appetite changes, gas  Vitamin / Mineral supplementation of Multivitamin with minerals and Calcium  Currently taking:  Vitamin D 1000mg  Iron 65mg  Multivitamin- OTC gummy once daily  Stopped taking Vit B12  Ordered nutritional labs to assess if needs are being met     Patient is not currently pregnant and doesn't desire to become pregnant a minimum of one year post-op- pt had tubal ligation.     Education provided to: patient    Barriers to learning: No barriers identified    Readiness to change: preparation/ action     Comprehension: verbalizes understanding and needs reinforcement     Expected Compliance: good    Recommendations  Pt is an appropriate candidate for surgery. Yes    Evaluation / Monitoring  Dietitian to Monitor: Eating pattern as discussed Tolerance of nutrition prescription Body weight Lab values Physical activity Bowel pattern  Pt reports that she was scheduled for IV iron  and was able to complete 3 infusion but had to discontinue due to a reaction.  She is currently taking oral iron 65 mg once per day.  She started to separate when she takes her vitamins and has been able to tolerate them .  She is taking her bariatric vitamin and mineral once per day, additional vitamin D, iron once per day and calcium once per day. She is drinking one protein shake per day .  Patient has been following the 30/30 rule. She has been drinking 64 ounces of fluid per day She has started going to the gym, 5 minutes on the steps and 15 minutes on the treadmill   On days she goes to the gym, she averages over 4000 steps.  Recommend she  monitor on active rest days at home to increase her  steps on non gym days.     Goals  Eliminate sugar sweetened beverages, Food journal, Exercise 30 minutes 5 times per week, Complete lession plans 1-6, and Eat 3 meals per day  Food log 1200 calories per day 65-75 grams protein - try to be more consistent   Continue with one per day bariatric vitamin and mineral , 2000 IU D3 and 65 mg of iron once per day   Continue with one protein shake and one calcium supplement to meet your needs ( pt has been cutting her calcium supplements due to size )   Track steps to > 4000 per day - aim to do this every day        Time Spent:   30 minutes

## 2024-11-11 NOTE — PROGRESS NOTES
Cardiology Follow Up    Steph Ceron  1985  [id number]  [facility]  [address] [facility]  [place] [id number] [phone]    1. Obesity, Class II, BMI 35-39.9  Ambulatory referral to Cardiology    POCT ECG      2. Preoperative cardiovascular examination  POCT ECG      3. Status post laparoscopic sleeve gastrectomy  Ambulatory referral to Cardiology    POCT ECG        Interval History: Patient is here for cardiology evaluation and preoperative clearance in reference to bariatric surgery.  Echocardiogram 8/6/2024 demonstrated LVEF of 64% with no significant valve disease.  48-hour HM 8/6/2024 demonstrated NSR with average heart rate of 80.  There was a low density of PACs and PVCs.  There were no symptoms.  Patient has had no chest pain or significant dyspnea.  Patient is a non-smoker.  There is no family history of heart disease.  EKG today demonstrates NSR and is a normal tracing.    Patient Active Problem List   Diagnosis    Atypical squamous cell changes of undetermined significance (ASCUS) on vaginal cytology with positive high risk human papilloma virus (HPV)    High grade squamous intraepithelial lesion of cervix    Encounter for sterilization    PONV (postoperative nausea and vomiting)    Obesity, Class II, BMI 35-39.9    Status post laparoscopic sleeve gastrectomy    Postsurgical malabsorption    Abnormal weight gain    Low ferritin    Low serum vitamin B12     Past Medical History:   Diagnosis Date    Abnormal Pap smear of cervix     Anemia     History of sleep apnea      Social History     Socioeconomic History    Marital status: /Civil Union     Spouse name: Not on file    Number of children: 3    Years of education: Not on file    Highest education level: Some college, no degree   Occupational History    Occupation: [employer]    Tobacco Use    Smoking status: Never     Passive exposure: Never    Smokeless  tobacco: Never   Vaping Use    Vaping status: Never Used   Substance and Sexual Activity    Alcohol use: Not Currently     Comment: Ocassional only    Drug use: Never    Sexual activity: Yes     Partners: Male     Birth control/protection: Female Sterilization   Other Topics Concern    Not on file   Social History Narrative    Not on file     Social Drivers of Health     Financial Resource Strain: Low Risk  (7/29/2024)    Overall Financial Resource Strain (CARDIA)     Difficulty of Paying Living Expenses: Not hard at all   Recent Concern: Financial Resource Strain - High Risk (5/24/2024)    Overall Financial Resource Strain (CARDIA)     Difficulty of Paying Living Expenses: Very hard   Food Insecurity: No Food Insecurity (7/29/2024)    Nursing - Inadequate Food Risk Classification     Worried About Running Out of Food in the Last Year: Never true     Ran Out of Food in the Last Year: Never true     Ran Out of Food in the Last Year: Not on file   Recent Concern: Food Insecurity - Food Insecurity Present (5/24/2024)    Hunger Vital Sign     Worried About Running Out of Food in the Last Year: Sometimes true     Ran Out of Food in the Last Year: Often true   Transportation Needs: No Transportation Needs (7/29/2024)    PRAPARE - Transportation     Lack of Transportation (Medical): No     Lack of Transportation (Non-Medical): No   Physical Activity: Sufficiently Active (6/11/2021)    Exercise Vital Sign     Days of Exercise per Week: 4 days     Minutes of Exercise per Session: 60 min   Stress: No Stress Concern Present (6/11/2021)    [place] of Occupational [employer] - Occupational Stress Questionnaire     Feeling of Stress : Not at all   Social Connections: Moderately Isolated (6/11/2021)    Social Connection and Isolation Panel [NHANES]     Frequency of Communication with Friends and Family: More than three times a week     Frequency of Social Gatherings with Friends and Family: Once a week     Attends Moravian  Services: Never     Active Member of Clubs or Organizations: No     Attends Club or Organization Meetings: Never     Marital Status:    Intimate Partner Violence: Not At Risk (6/11/2021)    Humiliation, Afraid, Rape, and Kick questionnaire     Fear of Current or Ex-Partner: No     Emotionally Abused: No     Physically Abused: No     Sexually Abused: No   Housing Stability: Low Risk  (7/29/2024)    Housing Stability Vital Sign     Unable to Pay for Housing in the Last Year: No     Number of Times Moved in the Last Year: 0     Homeless in the Last Year: No   Recent Concern: Housing Stability - High Risk (5/24/2024)    Housing Stability Vital Sign     Unable to Pay for Housing in the Last Year: Yes     Number of Times Moved in the Last Year: Not on file     Homeless in the Last Year: No      Family History   Problem Relation Age of Onset    Hypertension Mother     Hypertension Father      Past Surgical History:   Procedure Laterality Date    APPENDECTOMY      BACK SURGERY  12/09/2022    BARIATRIC SURGERY      HYSTERECTOMY      IN COLPOSCOPY CERVIX VAG LOOP ELTRD BX CERVIX N/A 09/15/2020    Procedure: BIOPSY LEEP CERVIX;  Surgeon: Abhishek Roy MD;  [facility];  Service: Gynecology    IN LAPAROSCOPY W/RMVL ADNEXAL STRUCTURES Bilateral 09/15/2020    Procedure: SALPINGECTOMY, LAPAROSCOPIC;  Surgeon: Abhishek Roy MD;  [facility];  Service: Gynecology    IN REMOVAL INTRAUTERINE DEVICE IUD N/A 09/15/2020    Procedure: REMOVAL OF INTRAUTERINE DEVICE (IUD);  Surgeon: Abhishek Roy MD;  [facility];  Service: Gynecology    SPINE SURGERY  12/20/2022    UPPER GASTROINTESTINAL ENDOSCOPY  10/2023       Current Outpatient Medications:     ergocalciferol (VITAMIN D2) 50,000 units, Take 1 capsule (50,000 Units total) by mouth once a week, Disp: 12 capsule, Rfl: 3    Semaglutide-Weight Management (WEGOVY) 0.5 MG/0.5ML, Inject 0.5 mL (0.5 mg total) under the skin once a week, Disp: 2 mL, Rfl: 0     "vitamin B-12 (VITAMIN B-12) 1,000 mcg tablet, Take 1 tablet (1,000 mcg total) by mouth daily, Disp: 90 tablet, Rfl: 3  No Known Allergies    Labs:not applicable  Imaging: No results found.    Review of Systems:  Review of Systems   All other systems reviewed and are negative.      Physical Exam:  /62 (BP Location: Left arm, Patient Position: Sitting, Cuff Size: Large)   Pulse 67   Ht 5' 5\" (1.651 m)   SpO2 99%   BMI 36.68 kg/m²   Physical Exam  Vitals reviewed.   Constitutional:       Appearance: She is well-developed.   HENT:      Head: Normocephalic and atraumatic.   Eyes:      Conjunctiva/sclera: Conjunctivae normal.      Pupils: Pupils are equal, round, and reactive to light.   Cardiovascular:      Rate and Rhythm: Normal rate.      Heart sounds: Normal heart sounds.   Pulmonary:      Effort: Pulmonary effort is normal.      Breath sounds: Normal breath sounds.   Musculoskeletal:      Cervical back: Normal range of motion and neck supple.   Skin:     General: Skin is warm and dry.   Neurological:      Mental Status: She is alert and oriented to person, place, and time.         Discussion/Summary: Patient has no cardiac symptoms.  Her EKG is normal.  Her recent cardiac testing looked good.  She is okay to proceed with bariatric surgery from a cardiac point of view.  I have asked her to call if there is a problem in the interim otherwise I will see her as needed going forward.  "

## 2024-11-18 ENCOUNTER — CLINICAL SUPPORT (OUTPATIENT)
Dept: BARIATRICS | Facility: CLINIC | Age: 39
End: 2024-11-18

## 2024-11-18 VITALS — BODY MASS INDEX: 36.68 KG/M2 | WEIGHT: 220.4 LBS

## 2024-11-18 DIAGNOSIS — E66.812 OBESITY, CLASS II, BMI 35-39.9: Primary | ICD-10-CM

## 2024-11-18 PROCEDURE — RECHECK

## 2024-11-18 NOTE — PROGRESS NOTES
Level 2 revision pathway   5 / 6 weight check  (English)     WEGOVY  0.50 mg (out of stock)        Vertical sleeve gastrectomy with Dr Alvarez at [facility] in [place]    Surgery Date: 2017  Gained weight after multiple child births.       highest weight prior to surgery was around 235 lbs and lowest was 187 lbs about 1.5 years postop. She reports started regaining weight gradually around 2018 despite exercise.        Surgeon: Dr. Neel Clark  Today's weight: 220.4  #   PLAN:  sleeve to RYGB    Focused on her food intake.  Choosing healthy foods.  Cooking at home.  Avoiding sweets.  Felt the Rx was helping, however, out of stock currently and last injection was Oct 21st.    Has lost weight since starting the revision process.   Hydrating with water.   Still planning on the revision.   Focused on portion sizes.  Measuring.  Does feel physical hunger at times.  Was tracking food, but not currently.  One protein shake (25g) a day in PM.  Eggs in the AM   Other protein from meat, fish.

## 2024-11-21 ENCOUNTER — OFFICE VISIT (OUTPATIENT)
Dept: CARDIOLOGY CLINIC | Facility: CLINIC | Age: 39
End: 2024-11-21
Payer: COMMERCIAL

## 2024-11-21 VITALS
DIASTOLIC BLOOD PRESSURE: 62 MMHG | OXYGEN SATURATION: 99 % | SYSTOLIC BLOOD PRESSURE: 126 MMHG | HEART RATE: 67 BPM | HEIGHT: 65 IN | BODY MASS INDEX: 36.68 KG/M2

## 2024-11-21 DIAGNOSIS — Z01.810 PREOPERATIVE CARDIOVASCULAR EXAMINATION: ICD-10-CM

## 2024-11-21 DIAGNOSIS — Z98.84 STATUS POST LAPAROSCOPIC SLEEVE GASTRECTOMY: ICD-10-CM

## 2024-11-21 DIAGNOSIS — E66.812 OBESITY, CLASS II, BMI 35-39.9: Primary | ICD-10-CM

## 2024-11-21 PROCEDURE — 93000 ELECTROCARDIOGRAM COMPLETE: CPT | Performed by: INTERNAL MEDICINE

## 2024-11-21 PROCEDURE — 99244 OFF/OP CNSLTJ NEW/EST MOD 40: CPT | Performed by: INTERNAL MEDICINE

## 2024-12-02 ENCOUNTER — CLINICAL SUPPORT (OUTPATIENT)
Dept: OBGYN CLINIC | Facility: CLINIC | Age: 39
End: 2024-12-02

## 2024-12-02 VITALS
BODY MASS INDEX: 37.19 KG/M2 | HEIGHT: 65 IN | HEART RATE: 84 BPM | SYSTOLIC BLOOD PRESSURE: 122 MMHG | WEIGHT: 223.2 LBS | DIASTOLIC BLOOD PRESSURE: 82 MMHG

## 2024-12-02 DIAGNOSIS — Z23 NEED FOR HPV VACCINE: Primary | ICD-10-CM

## 2024-12-02 PROCEDURE — 90651 9VHPV VACCINE 2/3 DOSE IM: CPT

## 2024-12-02 PROCEDURE — 90471 IMMUNIZATION ADMIN: CPT

## 2024-12-02 NOTE — PROGRESS NOTES
HPV given to patient in left arm on 12/2/2024.    NDC: 6641-0812-92  LOT: [id number]  EXP: 08/18/2026

## 2024-12-17 NOTE — PROGRESS NOTES
"Bariatric Nutrition Assessment Note  \"Patient was initially planning to undergo a gastric bypass in [place] but eventually had a sleeve gastrectomy. She lost about 50lbs following her surgery but has regained most of her weight back. She is now interested in additional options for weight loss. Discussed with patient regarding 6 months referral to medical weight loss and reassessment to see if she would qualify for a revision  Level 2 revisional pathway\"    Pt started the revision process but lost her job and insurance and was halted from the program  She now has insurance and is restarting the program.     Type of surgery    Vertical sleeve gastrectomy [facility] in [place], [place]  Surgery Date: 7/31/2017  6 years post-op  Surgeon: Dr. Alvarez  Pre op for possible revision   Surgery date : Expected Jan 2025  Surgeon :  Dr Neel Clark     Nutrition Assessment   Steph Ceron  39 y.o.  female   LMP 11/12/2024 (Approximate)    Wt 99.8 kg (220 lb 1.6 oz)   LMP 11/12/2024 (Approximate)   BMI 36.63 kg/m²      -11.5# x 6 months     Wt Readings from Last 3 Encounters:   12/02/24 101 kg (223 lb 3.2 oz)   11/18/24 100 kg (220 lb 6.4 oz)   10/31/24 101 kg (222 lb 4.8 oz)        Emily Estrella Equation:  DIE=0925xovu/day  Weight Maintenance= 2072kcal/day  Estimated calories for weight loss 1072-1572kcal/day (1-2# per wk wt loss - sedentary)  Estimated protein needs 68.3-102.4g/day (1.0-1.5 gms/kg IBW)  Estimated fluid needs 2049-2391ml/day (30-35 ml/kg IBW)    Weight on Day of Weight Loss Surgery: 235lbs  Weight in (lb) to have BMI = 25: 150.25lbs  Pre-Op Excess Wt: 84.75lbs  Post-op thien= 181lbs  47lb weight regain  Post-Op Wt Loss: 7#/ 8.3% EBWL in 4 year(s)    Maximum Wt Lost:  highest weight prior to surgery was around 235 lbs and lowest was 181 lbs about 1.5 years postop. She reports started regaining weight gradually around 2018 despite exercise. In 2020 she started gaining more significant amount of weight.     Pt " was prescribed Wegovy by her PCP. She started to take medication but it was frequently out of stock.  When she couldn't get her prescription filled , she would get overly hungry.  Decided to stop medication due to shortages and work towards revisional surgery. ( Sleeve to bypass )     Review of History and Medications   Past Medical History:   Diagnosis Date    Abnormal Pap smear of cervix     Anemia     History of sleep apnea      Past Surgical History:   Procedure Laterality Date    APPENDECTOMY      BACK SURGERY  12/09/2022    BARIATRIC SURGERY      HYSTERECTOMY      FL COLPOSCOPY CERVIX VAG LOOP ELTRD BX CERVIX N/A 09/15/2020    Procedure: BIOPSY LEEP CERVIX;  Surgeon: Abhishek Roy MD;  [facility];  Service: Gynecology    FL LAPAROSCOPY W/RMVL ADNEXAL STRUCTURES Bilateral 09/15/2020    Procedure: SALPINGECTOMY, LAPAROSCOPIC;  Surgeon: Abhishek Roy MD;  [facility];  Service: Gynecology    FL REMOVAL INTRAUTERINE DEVICE IUD N/A 09/15/2020    Procedure: REMOVAL OF INTRAUTERINE DEVICE (IUD);  Surgeon: Abhishek Roy MD;  [facility];  Service: Gynecology    SPINE SURGERY  12/20/2022    UPPER GASTROINTESTINAL ENDOSCOPY  10/2023     Social History     Socioeconomic History    Marital status: /Civil Union     Spouse name: Not on file    Number of children: 3    Years of education: Not on file    Highest education level: Some college, no degree   Occupational History    Occupation: [employer]    Tobacco Use    Smoking status: Never     Passive exposure: Never    Smokeless tobacco: Never   Vaping Use    Vaping status: Never Used   Substance and Sexual Activity    Alcohol use: Yes     Comment: Ocassional only    Drug use: Never    Sexual activity: Yes     Partners: Male     Birth control/protection: Female Sterilization   Other Topics Concern    Not on file   Social History Narrative    Not on file     Social Drivers of Health     Financial Resource Strain: Medium Risk (12/2/2024)    Overall  Financial Resource Strain (CARDIA)     Difficulty of Paying Living Expenses: Somewhat hard   Food Insecurity: No Food Insecurity (12/2/2024)    Hunger Vital Sign     Worried About Running Out of Food in the Last Year: Never true     Ran Out of Food in the Last Year: Never true   Transportation Needs: No Transportation Needs (12/2/2024)    PRAPARE - Transportation     Lack of Transportation (Medical): No     Lack of Transportation (Non-Medical): No   Physical Activity: Sufficiently Active (6/11/2021)    Exercise Vital Sign     Days of Exercise per Week: 4 days     Minutes of Exercise per Session: 60 min   Stress: No Stress Concern Present (6/11/2021)    [place] of Occupational Health - Occupational Stress Questionnaire     Feeling of Stress : Not at all   Social Connections: Moderately Isolated (6/11/2021)    Social Connection and Isolation Panel [NHANES]     Frequency of Communication with Friends and Family: More than three times a week     Frequency of Social Gatherings with Friends and Family: Once a week     Attends Baptist Services: Never     Active Member of Clubs or Organizations: No     Attends Club or Organization Meetings: Never     Marital Status:    Intimate Partner Violence: Not At Risk (6/11/2021)    Humiliation, Afraid, Rape, and Kick questionnaire     Fear of Current or Ex-Partner: No     Emotionally Abused: No     Physically Abused: No     Sexually Abused: No   Housing Stability: Low Risk  (12/2/2024)    Housing Stability Vital Sign     Unable to Pay for Housing in the Last Year: No     Number of Times Moved in the Last Year: 0     Homeless in the Last Year: No       Current Outpatient Medications:     ergocalciferol (VITAMIN D2) 50,000 units, Take 1 capsule (50,000 Units total) by mouth once a week, Disp: 12 capsule, Rfl: 3    Semaglutide-Weight Management (WEGOVY) 0.5 MG/0.5ML, Inject 0.5 mL (0.5 mg total) under the skin once a week, Disp: 2 mL, Rfl: 0    vitamin B-12 (VITAMIN  B-12) 1,000 mcg tablet, Take 1 tablet (1,000 mcg total) by mouth daily, Disp: 90 tablet, Rfl: 3    Food Intake and Lifestyle Assessment   Food Intake Assessment completed via usual diet recall  Takes her daughter to school   Wakes 7:30 am  4oz coffee with 2 spoons sugar  Breakfast: 8:30am: boiled egg with 2 slices of wheat bread   Snack: usually not.    Lunch: 1 pm: beans &  rice, meat: mostly chicken, sometimes beef or pork or shrimp, salad, plantains  Trying to eat more vegetables instead of rice or will eat her beans first   Snack : 0   Dinner : 5pm: protein shake with fruit  or cereal and fruit   Snack: sometimes bread if hungry , not usually   When mother in law lives with her she eats dinner   Otherwise will have a snack instead   Beverage intake: water, apple juice, and coffee  Protein supplement: Once per day - Gold Standard Whey protein mixed milk (1%)   Estimated protein intake per day: 60-70 g protein   Estimated fluid intake per day:  two to three 16-oz connell, 8 oz coffee, 4-8 oz juice  Meals eaten away from home: weekends: once per week will get pizza   Typical meal pattern: 2 meals per day and 1 snacks per day  Eating Behaviors: Consumption of high calorie beverages and skips dinner when mother in law not with her    Food allergies or intolerances: No Known Allergies FA  Cultural or Muslim considerations:     Physical Assessment  Physical Activity  Types of exercise: Some walking.   Goes to the gym - 2-3 times per week for 25 minutes - does recumbant bike or treadmill   Can only do light lifting. - has stopped due to back pain and only walks   Current physical limitations: back injury May 2022 back surgery December 2022.  Did 3 months of PT  Had surgery on her back for bulging disk last year, has some limitations for due to the surgery     Psychosocial Assessment   Support systems: lives with spouse and children: 17yo, 14yo, 12yo, mother in law currently living with her   Socioeconomic  "factors: works PT for [employer]   Brother had WLS after she did   Friend had WLS last year    Nutrition Diagnosis- continued   Diagnosis: Overweight / Obesity (NC-3.3), Excessive energy intake (NI-1.5), Inadequate protein intake (NI-5.7.3), and Altered GI function (NC-1.4)  Related to: Physical inactivity, Excessive energy intake, and Altered GI function  As Evidenced by: BMI >25, Expected anthropometric outcomes are not achieved, Excessive energy intake, and Unintentional weight gain     Nutrition Prescription: Recommend the following diet  1200 calories/ 65-75 grams protein per day     Interventions and Teaching   Discussed pre-op and post-op nutrition guidelines.       Patient educated and handouts provided.  Surgical changes to stomach / GI  Capacity of post-surgery stomach  Diet progression  Adequate hydration  Sugar and fat restriction to decrease \"dumping syndrome\"  Expected weight loss  Weight loss plateaus/ possibility of weight regain  Exercise  Nutrition considerations after surgery  Protein supplements  Meal planning and preparation  Appropriate carbohydrate, protein, and fat intake, and food/fluid choices to maximize safe weight loss, nutrient intake, and tolerance   Dietary and lifestyle changes  Possible problems with poor eating habits  Techniques for self monitoring and keeping daily food journal  Potential for food intolerance after surgery, and ways to deal with them including: lactose intolerance, nausea, reflux, vomiting, diarrhea, food intolerance, appetite changes, gas  Vitamin / Mineral supplementation of Multivitamin with minerals and Calcium  Currently taking:  Vitamin D 1000mg  Iron 65mg  Multivitamin- OTC gummy once daily  Stopped taking Vit B12  Ordered nutritional labs to assess if needs are being met     Patient is not currently pregnant and doesn't desire to become pregnant a minimum of one year post-op- pt had tubal ligation.     Education provided to: patient    Barriers to " learning: No barriers identified    Readiness to change: preparation/ action     Comprehension: verbalizes understanding and needs reinforcement     Expected Compliance: good    Recommendations  Pt is an appropriate candidate for surgery. Yes    Evaluation / Monitoring  Dietitian to Monitor: Eating pattern as discussed Tolerance of nutrition prescription Body weight Lab values Physical activity Bowel pattern  Patient is  taking oral iron 65 mg four times per week.  Experiences some diarrhea with her supplements but it is improving now that she has decreased to 4 days per week. .  She separates when she takes her vitamins and has been able to tolerate them better taken apart.  .  She is taking her bariatric vitamin and mineral once per day, additional vitamin D,  and calcium once per day. She is drinking one protein shake per day and eating 3 meals.  .  Patient has been following the 30/30 rule. She has been drinking 64 ounces of fluid per day She  has been inconsistent with tracking her steps since Thanksgiving.  Plans to start tracking again and return to the gym in January with her son. In consistent with food logging.      Goals- continued   Eliminate sugar sweetened beverages, Food journal, Exercise 30 minutes 5 times per week, Complete lession plans 1-6, and Eat 3 meals per day  Food log 1200 calories per day 65-75 grams protein - try to be more consistent   Continue with one per day bariatric vitamin and mineral , 2000 IU D3 and 65 mg of iron 4x per week   Continue with one protein shake and one calcium supplement to meet your needs ( pt has been cutting her calcium supplements due to size )   Track steps to > 4000 per day - aim to do this every day   Return to gym in January with her son 2-3 times per week        Time Spent:   30 minutes

## 2024-12-19 ENCOUNTER — CLINICAL SUPPORT (OUTPATIENT)
Dept: BARIATRICS | Facility: CLINIC | Age: 39
End: 2024-12-19

## 2024-12-19 VITALS — WEIGHT: 220.1 LBS | BODY MASS INDEX: 36.63 KG/M2

## 2024-12-19 DIAGNOSIS — Z98.84 BARIATRIC SURGERY STATUS: Primary | ICD-10-CM

## 2024-12-19 PROCEDURE — RECHECK: Performed by: DIETITIAN, REGISTERED

## 2024-12-27 ENCOUNTER — TELEPHONE (OUTPATIENT)
Dept: BARIATRICS | Facility: CLINIC | Age: 39
End: 2024-12-27

## 2025-01-02 ENCOUNTER — PREP FOR PROCEDURE (OUTPATIENT)
Dept: BARIATRICS | Facility: CLINIC | Age: 40
End: 2025-01-02

## 2025-01-02 DIAGNOSIS — K20.90 ESOPHAGITIS: ICD-10-CM

## 2025-01-02 DIAGNOSIS — R63.5 WEIGHT GAIN: Primary | ICD-10-CM

## 2025-01-02 NOTE — PROGRESS NOTES
"Bariatric Nutrition Assessment Note     Patient's name and  were verified during visit. Pt present in a state that I hold active licensure.   Provider explained that [employer] is a HIPPA compliant platform and to further protect their confidentiality the provider was alone and the office door was closed. Patient consented to the virtual video visit Patient consented to the [employer] visit.  This visit is free.     Type of surgery    Vertical sleeve gastrectomy [facility] in [place], [place]  Surgery Date: 2017  6 years post-op  Surgeon: Dr. Alvarez  Pre op for possible revision   Surgery date : 2025  Surgeon :  Dr Neel Clark    \"Patient was initially planning to undergo a gastric bypass in [place] but eventually had a sleeve gastrectomy. She lost about 50lbs following her surgery but has regained most of her weight back. She is now interested in additional options for weight loss. Discussed with patient regarding 6 months referral to medical weight loss and reassessment to see if she would qualify for a revision  Level 2 revisional pathway    Nutrition Assessment   Steph Mcraeta  39 y.o.  female   LMP 2024 (Exact Date)      -11.5# x 6 months     Wt Readings from Last 3 Encounters:   24 99.8 kg (220 lb 1.6 oz)   24 101 kg (223 lb 3.2 oz)   24 100 kg (220 lb 6.4 oz)        Emily Estrella Equation:  LWT=6438vnqs/day  Weight Maintenance= 2072kcal/day  Estimated calories for weight loss 1072-1572kcal/day (1-2# per wk wt loss - sedentary)  Estimated protein needs 68.3-102.4g/day (1.0-1.5 gms/kg IBW)  Estimated fluid needs 2049-2391ml/day (30-35 ml/kg IBW)    Weight on Day of Weight Loss Surgery: 235lbs  Weight in (lb) to have BMI = 25: 150.25lbs  Pre-Op Excess Wt: 84.75lbs  Post-op thien= 181lbs  47lb weight regain  Post-Op Wt Loss: 7#/ 8.3% EBWL in 4 year(s)    Maximum Wt Lost:  highest weight prior to surgery was around 235 lbs and lowest was 181 lbs about 1.5 years postop. She " reports started regaining weight gradually around 2018 despite exercise. In 2020 she started gaining more significant amount of weight.     Pt was prescribed Wegovy by her PCP. She started to take medication but it was frequently out of stock.  When she couldn't get her prescription filled , she would get overly hungry.  Decided to stop medication due to shortages and work towards revisional surgery. ( Sleeve to bypass )     Review of History and Medications   Past Medical History:   Diagnosis Date    Abnormal Pap smear of cervix     Anemia     GERD (gastroesophageal reflux disease)     History of sleep apnea     no CPAP since 2022     Past Surgical History:   Procedure Laterality Date    APPENDECTOMY      BACK SURGERY  12/09/2022    Lumbar    BARIATRIC SURGERY  2017    gastric sleeve    HYSTERECTOMY      LASIK      NM COLPOSCOPY CERVIX VAG LOOP ELTRD BX CERVIX N/A 09/15/2020    Procedure: BIOPSY LEEP CERVIX;  Surgeon: Abhishek Roy MD;  [facility];  Service: Gynecology    NM LAPAROSCOPY W/RMVL ADNEXAL STRUCTURES Bilateral 09/15/2020    Procedure: SALPINGECTOMY, LAPAROSCOPIC;  Surgeon: Abhishek Roy MD;  [facility];  Service: Gynecology    NM REMOVAL INTRAUTERINE DEVICE IUD N/A 09/15/2020    Procedure: REMOVAL OF INTRAUTERINE DEVICE (IUD);  Surgeon: Abhishek Roy MD;  [facility];  Service: Gynecology    SPINE SURGERY  12/20/2022    UPPER GASTROINTESTINAL ENDOSCOPY  10/2023     Social History     Socioeconomic History    Marital status: /Civil Union     Spouse name: Not on file    Number of children: 3    Years of education: Not on file    Highest education level: Some college, no degree   Occupational History    Occupation: [employer]    Tobacco Use    Smoking status: Never     Passive exposure: Never    Smokeless tobacco: Never   Vaping Use    Vaping status: Never Used   Substance and Sexual Activity    Alcohol use: Not Currently    Drug use: Never    Sexual activity: Yes      Partners: Male     Birth control/protection: Female Sterilization   Other Topics Concern    Not on file   Social History Narrative    Not on file     Social Drivers of Health     Financial Resource Strain: Medium Risk (12/2/2024)    Overall Financial Resource Strain (CARDIA)     Difficulty of Paying Living Expenses: Somewhat hard   Food Insecurity: No Food Insecurity (12/2/2024)    Hunger Vital Sign     Worried About Running Out of Food in the Last Year: Never true     Ran Out of Food in the Last Year: Never true   Transportation Needs: No Transportation Needs (12/2/2024)    PRAPARE - Transportation     Lack of Transportation (Medical): No     Lack of Transportation (Non-Medical): No   Physical Activity: Sufficiently Active (6/11/2021)    Exercise Vital Sign     Days of Exercise per Week: 4 days     Minutes of Exercise per Session: 60 min   Stress: No Stress Concern Present (6/11/2021)    [employer] - Occupational Stress Questionnaire     Feeling of Stress : Not at all   Social Connections: Moderately Isolated (6/11/2021)    Social Connection and Isolation Panel [NHANES]     Frequency of Communication with Friends and Family: More than three times a week     Frequency of Social Gatherings with Friends and Family: Once a week     Attends Sabianist Services: Never     Active Member of Clubs or Organizations: No     Attends Club or Organization Meetings: Never     Marital Status:    Intimate Partner Violence: Not At Risk (6/11/2021)    Humiliation, Afraid, Rape, and Kick questionnaire     Fear of Current or Ex-Partner: No     Emotionally Abused: No     Physically Abused: No     Sexually Abused: No   Housing Stability: Low Risk  (12/2/2024)    Housing Stability Vital Sign     Unable to Pay for Housing in the Last Year: No     Number of Times Moved in the Last Year: 0     Homeless in the Last Year: No       Current Outpatient Medications:     Calcium-Vitamin D-Vitamin K 500-100-40  MG-UNT-MCG CHEW, Chew in the morning, Disp: , Rfl:     Cholecalciferol (Vitamin D-3) 125 MCG (5000 UT) TABS, Take by mouth in the morning, Disp: , Rfl:     ergocalciferol (VITAMIN D2) 50,000 units, Take 1 capsule (50,000 Units total) by mouth once a week, Disp: 12 capsule, Rfl: 3    Ferrous Sulfate (Iron) 325 (65 Fe) MG TABS, Take by mouth in the morning, Disp: , Rfl:     multivitamin (THERAGRAN) TABS, Take 1 tablet by mouth daily, Disp: , Rfl:     Semaglutide-Weight Management (WEGOVY) 0.5 MG/0.5ML, Inject 0.5 mL (0.5 mg total) under the skin once a week, Disp: 2 mL, Rfl: 0    vitamin B-12 (VITAMIN B-12) 1,000 mcg tablet, Take 1 tablet (1,000 mcg total) by mouth daily, Disp: 90 tablet, Rfl: 3    Food Intake and Lifestyle Assessment   Food Intake Assessment completed via usual diet recall  Takes her daughter to school   Wakes 7:30 am  4oz coffee with 2 spoons sugar  Breakfast: 8:30am: boiled egg with 2 slices of wheat bread   Snack: usually not.    Lunch: 1 pm: beans &  rice, meat: mostly chicken, sometimes beef or pork or shrimp, salad, plantains  Trying to eat more vegetables instead of rice or will eat her beans first   Snack : 0   Dinner : 5pm: protein shake with fruit  or cereal and fruit   Snack: sometimes bread if hungry , not usually   When mother in law lives with her she eats dinner   Otherwise will have a snack instead   Beverage intake: water, apple juice, and coffee  Protein supplement: Once per day - Gold Standard Whey protein mixed milk (1%)   Estimated protein intake per day: 60-70 g protein   Estimated fluid intake per day:  two to three 16-oz connell, 8 oz coffee, 4-8 oz juice  Meals eaten away from home: weekends: once per week will get pizza   Typical meal pattern: 2 meals per day and 1 snacks per day  Eating Behaviors: Consumption of high calorie beverages and skips dinner when mother in law not with her    Food allergies or intolerances: No Known Allergies NKFA  Cultural or Buddhist  "considerations:     Physical Assessment  Physical Activity  Types of exercise: Some walking.   Goes to the gym - 2-3 times per week for 25 minutes - does recumbant bike or treadmill   Can only do light lifting. - has stopped due to back pain and only walks   Current physical limitations: back injury May 2022 back surgery December 2022.  Did 3 months of PT  Had surgery on her back for bulging disk last year, has some limitations for due to the surgery     Psychosocial Assessment   Support systems: lives with spouse and children: 17yo, 12yo, 12yo, mother in law currently living with her   Socioeconomic factors: works PT for [employer]   Brother had WLS after she did   Friend had WLS last year    Nutrition Diagnosis- continued   Diagnosis: Overweight / Obesity (NC-3.3), Excessive energy intake (NI-1.5), Inadequate protein intake (NI-5.7.3), and Altered GI function (NC-1.4)  Related to: Physical inactivity, Excessive energy intake, and Altered GI function  As Evidenced by: BMI >25, Expected anthropometric outcomes are not achieved, Excessive energy intake, and Unintentional weight gain     Nutrition Prescription: Recommend the following diet  Pre op Liver shrinking diet 700-900 calories/ 120 grams protein/ 45 grams carbohydrate   80 oz of calorie free beverages     Interventions and Teaching   Discussed pre-op and post-op nutrition guidelines.       Patient educated and handouts provided.  Surgical changes to stomach / GI  Capacity of post-surgery stomach  Diet progression  Adequate hydration  Sugar and fat restriction to decrease \"dumping syndrome\"  Expected weight loss  Weight loss plateaus/ possibility of weight regain  Exercise  Nutrition considerations after surgery  Protein supplements  Meal planning and preparation  Appropriate carbohydrate, protein, and fat intake, and food/fluid choices to maximize safe weight loss, nutrient intake, and tolerance   Dietary and lifestyle changes  Possible problems with " poor eating habits  Techniques for self monitoring and keeping daily food journal  Potential for food intolerance after surgery, and ways to deal with them including: lactose intolerance, nausea, reflux, vomiting, diarrhea, food intolerance, appetite changes, gas  Vitamin / Mineral supplementation of Multivitamin with minerals and Calcium  Currently taking:  Vitamin D 1000mg  Iron 65mg  Multivitamin- OTC gummy once daily  Stopped taking Vit B12  Patient is not currently pregnant and doesn't desire to become pregnant a minimum of one year post-op- pt had tubal ligation.     Pt. educated on the pre operative liver shrinking diet.  Pt understands that the diet needs to be followed for 2 weeks prior to surgery. Handout reviewed.( Provided via email ) Emphasized the need to drink 80 ounces of fluid per day while on the diet. Patient will return for a pre op weight check on January 9th 2025 to ensure she has met her  pre opgoal.     Education provided to: patient    Barriers to learning: No barriers identified    Readiness to change: preparation/ action     Comprehension: verbalizes understanding and needs reinforcement     Expected Compliance: good    Recommendations  Pt is an appropriate candidate for surgery. Yes    Evaluation / Monitoring  Dietitian to Monitor: Eating pattern as discussed Tolerance of nutrition prescription Body weight Lab values Physical activity Bowel pattern  Patient is  taking oral iron 65 mg four times per week.  Experiences some diarrhea with her supplements but it is improving now that she has decreased to 4 days per week. .  She separates when she takes her vitamins and has been able to tolerate them better taken apart.  .  She is taking her bariatric vitamin and mineral once per day, additional vitamin D,  and calcium once per day. She is drinking one protein shake per day and eating 3 meals.  .  Patient has been following the 30/30 rule. She has been drinking 64 ounces of fluid per day She   has been inconsistent with tracking her steps since Thanksgiving.  Plans to start tracking again and return to the gym in January with her son. In consistent with food logging.      Goals- continued   Eliminate sugar sweetened beverages, Food journal, Exercise 30 minutes 5 times per week, Complete lession plans 1-6, and Eat 3 meals per day  Continue with one per day bariatric vitamin and mineral , 2000 IU D3 and 65 mg of iron 4x per week   Continue with one protein shake and one calcium supplement to meet your needs ( pt has been cutting her calcium supplements due to size )   Start pre op liver shrinking diet as educated- today 1/3/2025     Time Spent:   30 minutes

## 2025-01-03 ENCOUNTER — TELEMEDICINE (OUTPATIENT)
Dept: BARIATRICS | Facility: CLINIC | Age: 40
End: 2025-01-03

## 2025-01-03 DIAGNOSIS — Z98.84 BARIATRIC SURGERY STATUS: Primary | ICD-10-CM

## 2025-01-03 PROCEDURE — RECHECK: Performed by: DIETITIAN, REGISTERED

## 2025-01-07 ENCOUNTER — RESULTS FOLLOW-UP (OUTPATIENT)
Dept: BARIATRICS | Facility: CLINIC | Age: 40
End: 2025-01-07

## 2025-01-07 DIAGNOSIS — R79.0 LOW FERRITIN: ICD-10-CM

## 2025-01-07 DIAGNOSIS — E53.8 VITAMIN B12 DEFICIENCY: ICD-10-CM

## 2025-01-07 DIAGNOSIS — E55.9 VITAMIN D DEFICIENCY: Primary | ICD-10-CM

## 2025-01-07 LAB
ALBUMIN SERPL-MCNC: 4.2 G/DL (ref 3.5–5.7)
ALP SERPL-CCNC: 49 U/L (ref 35–120)
ALT SERPL-CCNC: 17 U/L
ANION GAP SERPL CALCULATED.3IONS-SCNC: 10 MMOL/L (ref 3–11)
AST SERPL-CCNC: 22 U/L
BILIRUB SERPL-MCNC: 0.6 MG/DL (ref 0.2–1)
BUN SERPL-MCNC: 11 MG/DL (ref 7–25)
CALCIUM SERPL-MCNC: 9.2 MG/DL (ref 8.5–10.5)
CHLORIDE SERPL-SCNC: 103 MMOL/L (ref 100–109)
CO2 SERPL-SCNC: 26 MMOL/L (ref 21–31)
CREAT SERPL-MCNC: 0.66 MG/DL (ref 0.4–1.1)
CYTOLOGY CMNT CVX/VAG CYTO-IMP: NORMAL
ERYTHROCYTE [DISTWIDTH] IN BLOOD BY AUTOMATED COUNT: 13.6 % (ref 12–16)
GFR/BSA.PRED SERPLBLD CYS-BASED-ARV: 115 ML/MIN/{1.73_M2}
GLUCOSE SERPL-MCNC: 74 MG/DL (ref 65–99)
HCT VFR BLD AUTO: 41.6 % (ref 35–43)
HGB BLD-MCNC: 13.9 G/DL (ref 11.5–14.5)
MCH RBC QN AUTO: 30 PG (ref 26–34)
MCHC RBC AUTO-ENTMCNC: 33.4 G/DL (ref 32–37)
MCV RBC AUTO: 90 FL (ref 80–100)
PLATELET # BLD AUTO: 207 THOU/CMM (ref 140–350)
PMV BLD REES-ECKER: 10.6 FL (ref 7.5–11.3)
POTASSIUM SERPL-SCNC: 4 MMOL/L (ref 3.5–5.2)
PROT SERPL-MCNC: 7.3 G/DL (ref 6.3–8.3)
RBC # BLD AUTO: 4.64 MILL/CMM (ref 3.7–4.7)
SODIUM SERPL-SCNC: 139 MMOL/L (ref 135–145)
WBC # BLD AUTO: 5.7 THOU/CMM (ref 4–10)

## 2025-01-08 RX ORDER — LANOLIN ALCOHOL/MO/W.PET/CERES
1000 CREAM (GRAM) TOPICAL DAILY
Qty: 90 TABLET | Refills: 0 | Status: SHIPPED | OUTPATIENT
Start: 2025-01-08

## 2025-01-08 RX ORDER — DIPHENOXYLATE HYDROCHLORIDE AND ATROPINE SULFATE 2.5; .025 MG/1; MG/1
1 TABLET ORAL DAILY
Qty: 30 TABLET | Refills: 0 | Status: SHIPPED | OUTPATIENT
Start: 2025-01-08

## 2025-01-08 RX ORDER — PNV NO.95/FERROUS FUM/FOLIC AC 28MG-0.8MG
1 TABLET ORAL DAILY
Qty: 30 TABLET | Refills: 0 | Status: SHIPPED | OUTPATIENT
Start: 2025-01-08

## 2025-01-09 ENCOUNTER — CLINICAL SUPPORT (OUTPATIENT)
Dept: BARIATRICS | Facility: CLINIC | Age: 40
End: 2025-01-09

## 2025-01-09 ENCOUNTER — OFFICE VISIT (OUTPATIENT)
Dept: BARIATRICS | Facility: CLINIC | Age: 40
End: 2025-01-09
Payer: COMMERCIAL

## 2025-01-09 VITALS
SYSTOLIC BLOOD PRESSURE: 124 MMHG | HEART RATE: 68 BPM | BODY MASS INDEX: 36.32 KG/M2 | WEIGHT: 218 LBS | TEMPERATURE: 98.7 F | HEIGHT: 65 IN | OXYGEN SATURATION: 97 % | DIASTOLIC BLOOD PRESSURE: 82 MMHG

## 2025-01-09 DIAGNOSIS — K21.9 GASTROESOPHAGEAL REFLUX DISEASE WITHOUT ESOPHAGITIS: ICD-10-CM

## 2025-01-09 DIAGNOSIS — E66.812 OBESITY, CLASS II, BMI 35-39.9: Primary | ICD-10-CM

## 2025-01-09 DIAGNOSIS — Z98.84 STATUS POST LAPAROSCOPIC SLEEVE GASTRECTOMY: ICD-10-CM

## 2025-01-09 PROCEDURE — RECHECK: Performed by: DIETITIAN, REGISTERED

## 2025-01-09 PROCEDURE — 99213 OFFICE O/P EST LOW 20 MIN: CPT | Performed by: SURGERY

## 2025-01-09 RX ORDER — HEPARIN SODIUM 5000 [USP'U]/ML
5000 INJECTION, SOLUTION INTRAVENOUS; SUBCUTANEOUS ONCE
Status: CANCELLED | OUTPATIENT
Start: 2025-01-13 | End: 2025-01-09

## 2025-01-09 RX ORDER — ACETAMINOPHEN 10 MG/ML
1000 INJECTION, SOLUTION INTRAVENOUS ONCE
Status: CANCELLED | OUTPATIENT
Start: 2025-01-13 | End: 2025-01-09

## 2025-01-09 RX ORDER — CELECOXIB 200 MG/1
200 CAPSULE ORAL ONCE
Status: CANCELLED | OUTPATIENT
Start: 2025-01-13 | End: 2025-01-09

## 2025-01-09 RX ORDER — METRONIDAZOLE 500 MG/100ML
500 INJECTION, SOLUTION INTRAVENOUS ONCE
Status: CANCELLED | OUTPATIENT
Start: 2025-01-13 | End: 2025-01-09

## 2025-01-09 RX ORDER — CEFAZOLIN SODIUM 2 G/50ML
2000 SOLUTION INTRAVENOUS ONCE
Status: CANCELLED | OUTPATIENT
Start: 2025-01-13 | End: 2025-01-09

## 2025-01-09 NOTE — H&P (VIEW-ONLY)
BARIATRIC H&P - BARIATRIC SURGERY  Steph Ceron 39 y.o. female MRN: [id number]  Unit/Bed#:  Encounter: [id number]      HPI:  Steph Ceron is a 39 y.o. female who underwent a sleeve gastrectomy and has developed GERD and weight regain  She is here today to discuss details of her surgery.    Review of Systems   All other systems reviewed and are negative.      Historical Information   Past Medical History:   Diagnosis Date    Abnormal Pap smear of cervix     Anemia     GERD (gastroesophageal reflux disease)     History of sleep apnea     no CPAP since 2022     Past Surgical History:   Procedure Laterality Date    APPENDECTOMY      BACK SURGERY  12/09/2022    Lumbar    BARIATRIC SURGERY  2017    gastric sleeve    HYSTERECTOMY      LASIK      MI COLPOSCOPY CERVIX VAG LOOP ELTRD BX CERVIX N/A 09/15/2020    Procedure: BIOPSY LEEP CERVIX;  Surgeon: Abhishek Roy MD;  [facility];  Service: Gynecology    MI LAPAROSCOPY W/RMVL ADNEXAL STRUCTURES Bilateral 09/15/2020    Procedure: SALPINGECTOMY, LAPAROSCOPIC;  Surgeon: Abhishek Roy MD;  [facility];  Service: Gynecology    MI REMOVAL INTRAUTERINE DEVICE IUD N/A 09/15/2020    Procedure: REMOVAL OF INTRAUTERINE DEVICE (IUD);  Surgeon: Abhishek Roy MD;  [facility];  Service: Gynecology    SPINE SURGERY  12/20/2022    UPPER GASTROINTESTINAL ENDOSCOPY  10/2023     Social History   Social History     Substance and Sexual Activity   Alcohol Use Not Currently     Social History     Substance and Sexual Activity   Drug Use Never     Social History     Tobacco Use   Smoking Status Never    Passive exposure: Never   Smokeless Tobacco Never     Family History: Family history non-contributory    Meds/Allergies   all medications and allergies reviewed  No Known Allergies    Objective     Current Vitals:   Blood Pressure: 124/82 (01/09/25 1418)  Pulse: 68 (01/09/25 1418)  Temperature: 98.7 °F (37.1 °C) (01/09/25 1418)  Temp Source: Tympanic (01/09/25  "1418)  Height: 5' 5\" (165.1 cm) (01/09/25 1418)  Weight - Scale: 98.9 kg (218 lb) (01/09/25 1418)  SpO2: 97 % (01/09/25 1418)      Invasive Devices       None                   Physical Exam  Vitals and nursing note reviewed.   Constitutional:       Appearance: Normal appearance. She is well-developed.   HENT:      Head: Normocephalic and atraumatic.      Nose: Nose normal.   Eyes:      General: No scleral icterus.        Right eye: No discharge.         Left eye: No discharge.      Conjunctiva/sclera: Conjunctivae normal.   Cardiovascular:      Rate and Rhythm: Normal rate and regular rhythm.      Heart sounds: Normal heart sounds.   Pulmonary:      Effort: Pulmonary effort is normal.      Breath sounds: Normal breath sounds. No stridor. No wheezing or rales.   Chest:      Chest wall: No tenderness.   Abdominal:      General: Bowel sounds are normal.      Palpations: Abdomen is soft.      Tenderness: There is no abdominal tenderness. There is no guarding or rebound.      Comments: Abdomen is obese, soft and benign.  Well-healed laparoscopic incisions from previous surgeries   Musculoskeletal:         General: Normal range of motion.      Cervical back: Normal range of motion and neck supple.   Lymphadenopathy:      Cervical: No cervical adenopathy.   Skin:     General: Skin is warm and dry.      Findings: No erythema or rash.   Neurological:      Mental Status: She is alert and oriented to person, place, and time.   Psychiatric:         Behavior: Behavior normal.         Thought Content: Thought content normal.         Judgment: Judgment normal.         Lab Results: I have personally reviewed pertinent lab results.    Imaging: Results Review Statement: No pertinent imaging studies reviewed.  EKG, Pathology, and Other Studies: Results Review Statement: No pertinent imaging studies reviewed.  The endoscopy showed Esophagitis LA grade A  Sleeve pouch gastritis with mild erythematous mucosa.  Slightly prominent upper " portion of the sleeve.  The biopsies revealed  Final Diagnosis  A. Stomach, biopsy:  -Gastric antral mucosa with reactive/chemical gastropathy.  -Gastric oxyntic mucosa with no significant histopathologic change.  -Negative for Helicobacter pylori organisms on H&E stain.          Assessment/PLAN:    39 y.o. female s/p Vertical Sleeve Gastrectomy with Dr Alvarez in 2017 at [facility] in [place]. Presents to the office last year with weight regain.     Her highest weight prior to surgery was around 235 lbs and lowest was 187 lbs about 1.5 years postop. Denies any postop complications. She reports started regaining weight gradually around 2018 despite exercise. In 2020 she started gaining more significant amount of weight. Overall states she eats a healthy diet.      She was interested in revision if appropriate. She states initially the surgeon had recommended bypass but patient ultimately chose the sleeve.      I have reviewed myself the upper GI images that were obtained and the study revealed Status post gastric sleeve procedure. Possible mild reexpansion of gastric volume but this is uncertain without any postoperative comparison imaging.     She then underwent a EGD that revealed esophagitis and slight prominent upper portion of the sleeve.    She has been pre certified to undergo a Laparoscopic conversion from sleeve gastrectomy to a Kingsley-en-Y gastric bypass.    Here today to review her pre op test results.      Has been medically cleared for the procedure.      I have discussed with her at length the risks and benefits of the operation and reiterated the components of our multidisciplinary program and the importance of compliance and follow up in the post operative period. Although there is a great statistical chance of improvement or even resolution of most of her associated comorbidities, the results vary from patient to patient and they largely depend on her commitment.     The patient was also instructed  with regards to the importance of behavior modification, nutritional counseling, support meeting attendance and lifestyle changes that are important to ensure success.    I have explained and reviewed the instructions for stopping or tapering anti-obesity medications prior to surgery.  She was given the opportunity to ask questions and I have answered all of them.     I have addressed with the patient the level of CODE STATUS for this hospital stay and after explaining the different options currently she wishes to be a Level I.    She understands and wishes to proceed.    She has lost all the weight required prior to surgery.    Neel Clark MD  1/9/2025  2:32 PM

## 2025-01-09 NOTE — PROGRESS NOTES
Pt attended pre-op education session. Standardized packet of information for bariatric surgery was provided and reviewed with pt. Importance of lifestyle change and development of regular exercise routine stressed.   Pt. educated on two-week pre operative liquid protein liver shrinking diet.  Pt understands that the diet needs to be followed for 2 weeks prior to surgery. Handout reviewed.   Emphasized the need to drink 80 ounces of fluid per day while on the diet and to contact PCP to adjust any diabetes or blood pressure medicines prior to starting the diet.  Patient will not eat any solid food for 2 days prior to surgery, including 2 cups of non starchy vegetables to ensure that the stomach will be empty day of surgery. Reviewed Ensure pre-surgery ERAS drink instructions, protein supplement suggestions, post-operative clear liquid, full liquid, and pureed diet stages, post-operative nutrition rules and facts, and post-operative bariatric multivitamin/mineral recommendations and brand comparison. Reviewed instructions for stopping or tapering anti-obesity medications prior to surgery.      Pt given the opportunity to ask questions. Questions were answered. Pt verbalized understanding of all information provided. Pt appeared prepared for upcoming surgery. Contact information provided for any questions/concerns.

## 2025-01-09 NOTE — H&P
BARIATRIC H&P - BARIATRIC SURGERY  Steph Ceron 39 y.o. female MRN: [id number]  Unit/Bed#:  Encounter: [id number]      HPI:  Steph Ceron is a 39 y.o. female who underwent a sleeve gastrectomy and has developed GERD and weight regain  She is here today to discuss details of her surgery.    Review of Systems   All other systems reviewed and are negative.      Historical Information   Past Medical History:   Diagnosis Date    Abnormal Pap smear of cervix     Anemia     GERD (gastroesophageal reflux disease)     History of sleep apnea     no CPAP since 2022     Past Surgical History:   Procedure Laterality Date    APPENDECTOMY      BACK SURGERY  12/09/2022    Lumbar    BARIATRIC SURGERY  2017    gastric sleeve    HYSTERECTOMY      LASIK      CT COLPOSCOPY CERVIX VAG LOOP ELTRD BX CERVIX N/A 09/15/2020    Procedure: BIOPSY LEEP CERVIX;  Surgeon: Abhishek Roy MD;  Location: [facility];  Service: Gynecology    CT LAPAROSCOPY W/RMVL ADNEXAL STRUCTURES Bilateral 09/15/2020    Procedure: SALPINGECTOMY, LAPAROSCOPIC;  Surgeon: Abhishek Roy MD;  Location: [facility];  Service: Gynecology    CT REMOVAL INTRAUTERINE DEVICE IUD N/A 09/15/2020    Procedure: REMOVAL OF INTRAUTERINE DEVICE (IUD);  Surgeon: Abhishek Roy MD;  Location[facility];  Service: Gynecology    SPINE SURGERY  12/20/2022    UPPER GASTROINTESTINAL ENDOSCOPY  10/2023     Social History   Social History     Substance and Sexual Activity   Alcohol Use Not Currently     Social History     Substance and Sexual Activity   Drug Use Never     Social History     Tobacco Use   Smoking Status Never    Passive exposure: Never   Smokeless Tobacco Never     Family History: Family history non-contributory    Meds/Allergies   all medications and allergies reviewed  No Known Allergies    Objective     Current Vitals:   Blood Pressure: 124/82 (01/09/25 1418)  Pulse: 68 (01/09/25 1418)  Temperature: 98.7 °F (37.1 °C) (01/09/25 1418)  Temp Source: Tympanic (01/09/25  "1418)  Height: 5' 5\" (165.1 cm) (01/09/25 1418)  Weight - Scale: 98.9 kg (218 lb) (01/09/25 1418)  SpO2: 97 % (01/09/25 1418)      Invasive Devices       None                   Physical Exam  Vitals and nursing note reviewed.   Constitutional:       Appearance: Normal appearance. She is well-developed.   HENT:      Head: Normocephalic and atraumatic.      Nose: Nose normal.   Eyes:      General: No scleral icterus.        Right eye: No discharge.         Left eye: No discharge.      Conjunctiva/sclera: Conjunctivae normal.   Cardiovascular:      Rate and Rhythm: Normal rate and regular rhythm.      Heart sounds: Normal heart sounds.   Pulmonary:      Effort: Pulmonary effort is normal.      Breath sounds: Normal breath sounds. No stridor. No wheezing or rales.   Chest:      Chest wall: No tenderness.   Abdominal:      General: Bowel sounds are normal.      Palpations: Abdomen is soft.      Tenderness: There is no abdominal tenderness. There is no guarding or rebound.      Comments: Abdomen is obese, soft and benign.  Well-healed laparoscopic incisions from previous surgeries   Musculoskeletal:         General: Normal range of motion.      Cervical back: Normal range of motion and neck supple.   Lymphadenopathy:      Cervical: No cervical adenopathy.   Skin:     General: Skin is warm and dry.      Findings: No erythema or rash.   Neurological:      Mental Status: She is alert and oriented to person, place, and time.   Psychiatric:         Behavior: Behavior normal.         Thought Content: Thought content normal.         Judgment: Judgment normal.         Lab Results: I have personally reviewed pertinent lab results.    Imaging: Results Review Statement: No pertinent imaging studies reviewed.  EKG, Pathology, and Other Studies: Results Review Statement: No pertinent imaging studies reviewed.  The endoscopy showed Esophagitis LA grade A  Sleeve pouch gastritis with mild erythematous mucosa.  Slightly prominent upper " portion of the sleeve.  The biopsies revealed  Final Diagnosis  A. Stomach, biopsy:  -Gastric antral mucosa with reactive/chemical gastropathy.  -Gastric oxyntic mucosa with no significant histopathologic change.  -Negative for Helicobacter pylori organisms on H&E stain.          Assessment/PLAN:    39 y.o. female s/p Vertical Sleeve Gastrectomy with Dr Alvarez in 2017 at [facility] in [place]. Presents to the office last year with weight regain.     Her highest weight prior to surgery was around 235 lbs and lowest was 187 lbs about 1.5 years postop. Denies any postop complications. She reports started regaining weight gradually around 2018 despite exercise. In 2020 she started gaining more significant amount of weight. Overall states she eats a healthy diet.      She was interested in revision if appropriate. She states initially the surgeon had recommended bypass but patient ultimately chose the sleeve.      I have reviewed myself the upper GI images that were obtained and the study revealed Status post gastric sleeve procedure. Possible mild reexpansion of gastric volume but this is uncertain without any postoperative comparison imaging.     She then underwent a EGD that revealed esophagitis and slight prominent upper portion of the sleeve.    She has been pre certified to undergo a Laparoscopic conversion from sleeve gastrectomy to a Kingsley-en-Y gastric bypass.    Here today to review her pre op test results.      Has been medically cleared for the procedure.      I have discussed with her at length the risks and benefits of the operation and reiterated the components of our multidisciplinary program and the importance of compliance and follow up in the post operative period. Although there is a great statistical chance of improvement or even resolution of most of her associated comorbidities, the results vary from patient to patient and they largely depend on her commitment.     The patient was also instructed  with regards to the importance of behavior modification, nutritional counseling, support meeting attendance and lifestyle changes that are important to ensure success.    I have explained and reviewed the instructions for stopping or tapering anti-obesity medications prior to surgery.  She was given the opportunity to ask questions and I have answered all of them.     I have addressed with the patient the level of CODE STATUS for this hospital stay and after explaining the different options currently she wishes to be a Level I.    She understands and wishes to proceed.    She has lost all the weight required prior to surgery.    Neel Clark MD  1/9/2025  2:32 PM

## 2025-01-10 DIAGNOSIS — Z98.84 BARIATRIC SURGERY STATUS: Primary | ICD-10-CM

## 2025-01-13 ENCOUNTER — PREP FOR PROCEDURE (OUTPATIENT)
Dept: BARIATRICS | Facility: CLINIC | Age: 40
End: 2025-01-13

## 2025-01-13 DIAGNOSIS — R63.5 WEIGHT GAIN: Primary | ICD-10-CM

## 2025-01-13 DIAGNOSIS — K20.90 ESOPHAGITIS: ICD-10-CM

## 2025-01-14 ENCOUNTER — ANESTHESIA EVENT (INPATIENT)
Dept: PERIOP | Facility: HOSPITAL | Age: 40
DRG: 220 | End: 2025-01-14
Payer: COMMERCIAL

## 2025-01-14 ENCOUNTER — APPOINTMENT (INPATIENT)
Dept: RADIOLOGY | Facility: HOSPITAL | Age: 40
DRG: 220 | End: 2025-01-14
Payer: COMMERCIAL

## 2025-01-14 ENCOUNTER — HOSPITAL ENCOUNTER (INPATIENT)
Facility: HOSPITAL | Age: 40
LOS: 1 days | Discharge: HOME | DRG: 220 | End: 2025-01-15
Attending: SURGERY | Admitting: SURGERY
Payer: COMMERCIAL

## 2025-01-14 ENCOUNTER — ANESTHESIA (INPATIENT)
Dept: PERIOP | Facility: HOSPITAL | Age: 40
DRG: 220 | End: 2025-01-14
Payer: COMMERCIAL

## 2025-01-14 DIAGNOSIS — R63.5 WEIGHT GAIN: ICD-10-CM

## 2025-01-14 DIAGNOSIS — K20.90 ESOPHAGITIS: ICD-10-CM

## 2025-01-14 DIAGNOSIS — E66.812 OBESITY, CLASS II, BMI 35-39.9: Primary | ICD-10-CM

## 2025-01-14 LAB
EXT PREGNANCY TEST URINE: NEGATIVE
EXT. CONTROL: NORMAL

## 2025-01-14 PROCEDURE — 88307 TISSUE EXAM BY PATHOLOGIST: CPT | Performed by: STUDENT IN AN ORGANIZED HEALTH CARE EDUCATION/TRAINING PROGRAM

## 2025-01-14 PROCEDURE — 43848 REVJ OPEN GSTR RSTCV PX: CPT | Performed by: STUDENT IN AN ORGANIZED HEALTH CARE EDUCATION/TRAINING PROGRAM

## 2025-01-14 PROCEDURE — 43848 REVJ OPEN GSTR RSTCV PX: CPT | Performed by: SURGERY

## 2025-01-14 PROCEDURE — 0DJ08ZZ INSPECTION OF UPPER INTESTINAL TRACT, VIA NATURAL OR ARTIFICIAL OPENING ENDOSCOPIC: ICD-10-PCS | Performed by: SURGERY

## 2025-01-14 PROCEDURE — 0D164ZA BYPASS STOMACH TO JEJUNUM, PERCUTANEOUS ENDOSCOPIC APPROACH: ICD-10-PCS | Performed by: SURGERY

## 2025-01-14 PROCEDURE — C1781 MESH (IMPLANTABLE): HCPCS | Performed by: SURGERY

## 2025-01-14 PROCEDURE — 74018 RADEX ABDOMEN 1 VIEW: CPT

## 2025-01-14 PROCEDURE — 81025 URINE PREGNANCY TEST: CPT | Performed by: ANESTHESIOLOGY

## 2025-01-14 DEVICE — SEAMGUARD STPL REINF ENDO GIA ULTRA UNIV 60 PURPLE: Type: IMPLANTABLE DEVICE | Site: ABDOMEN | Status: FUNCTIONAL

## 2025-01-14 RX ORDER — SIMETHICONE 80 MG
80 TABLET,CHEWABLE ORAL 4 TIMES DAILY PRN
Status: DISCONTINUED | OUTPATIENT
Start: 2025-01-14 | End: 2025-01-15 | Stop reason: HOSPADM

## 2025-01-14 RX ORDER — SODIUM CHLORIDE, SODIUM LACTATE, POTASSIUM CHLORIDE, CALCIUM CHLORIDE 600; 310; 30; 20 MG/100ML; MG/100ML; MG/100ML; MG/100ML
100 INJECTION, SOLUTION INTRAVENOUS CONTINUOUS
Status: DISCONTINUED | OUTPATIENT
Start: 2025-01-14 | End: 2025-01-15 | Stop reason: HOSPADM

## 2025-01-14 RX ORDER — HEPARIN SODIUM 5000 [USP'U]/ML
5000 INJECTION, SOLUTION INTRAVENOUS; SUBCUTANEOUS ONCE
Status: COMPLETED | OUTPATIENT
Start: 2025-01-14 | End: 2025-01-14

## 2025-01-14 RX ORDER — LIDOCAINE HYDROCHLORIDE 20 MG/ML
INJECTION, SOLUTION EPIDURAL; INFILTRATION; INTRACAUDAL; PERINEURAL AS NEEDED
Status: DISCONTINUED | OUTPATIENT
Start: 2025-01-14 | End: 2025-01-14

## 2025-01-14 RX ORDER — ACETAMINOPHEN 10 MG/ML
1000 INJECTION, SOLUTION INTRAVENOUS ONCE
Status: COMPLETED | OUTPATIENT
Start: 2025-01-14 | End: 2025-01-14

## 2025-01-14 RX ORDER — OXYCODONE HCL 5 MG/5 ML
5 SOLUTION, ORAL ORAL EVERY 4 HOURS PRN
Status: DISCONTINUED | OUTPATIENT
Start: 2025-01-14 | End: 2025-01-15 | Stop reason: HOSPADM

## 2025-01-14 RX ORDER — ACETAMINOPHEN 10 MG/ML
1000 INJECTION, SOLUTION INTRAVENOUS EVERY 6 HOURS SCHEDULED
Status: DISCONTINUED | OUTPATIENT
Start: 2025-01-14 | End: 2025-01-15 | Stop reason: HOSPADM

## 2025-01-14 RX ORDER — METRONIDAZOLE 500 MG/100ML
500 INJECTION, SOLUTION INTRAVENOUS EVERY 8 HOURS
Status: COMPLETED | OUTPATIENT
Start: 2025-01-14 | End: 2025-01-15

## 2025-01-14 RX ORDER — CELECOXIB 200 MG/1
200 CAPSULE ORAL ONCE
Status: COMPLETED | OUTPATIENT
Start: 2025-01-14 | End: 2025-01-14

## 2025-01-14 RX ORDER — ONDANSETRON 2 MG/ML
4 INJECTION INTRAMUSCULAR; INTRAVENOUS EVERY 6 HOURS PRN
Status: DISCONTINUED | OUTPATIENT
Start: 2025-01-14 | End: 2025-01-15 | Stop reason: HOSPADM

## 2025-01-14 RX ORDER — CEFAZOLIN SODIUM 2 G/50ML
2000 SOLUTION INTRAVENOUS EVERY 8 HOURS
Status: COMPLETED | OUTPATIENT
Start: 2025-01-14 | End: 2025-01-15

## 2025-01-14 RX ORDER — CEFAZOLIN SODIUM 2 G/50ML
2000 SOLUTION INTRAVENOUS ONCE
Status: COMPLETED | OUTPATIENT
Start: 2025-01-14 | End: 2025-01-14

## 2025-01-14 RX ORDER — EPHEDRINE SULFATE 50 MG/ML
INJECTION INTRAVENOUS AS NEEDED
Status: DISCONTINUED | OUTPATIENT
Start: 2025-01-14 | End: 2025-01-14

## 2025-01-14 RX ORDER — ROCURONIUM BROMIDE 10 MG/ML
INJECTION, SOLUTION INTRAVENOUS AS NEEDED
Status: DISCONTINUED | OUTPATIENT
Start: 2025-01-14 | End: 2025-01-14

## 2025-01-14 RX ORDER — DEXAMETHASONE SODIUM PHOSPHATE 10 MG/ML
INJECTION, SOLUTION INTRAMUSCULAR; INTRAVENOUS AS NEEDED
Status: DISCONTINUED | OUTPATIENT
Start: 2025-01-14 | End: 2025-01-14

## 2025-01-14 RX ORDER — FENTANYL CITRATE 50 UG/ML
INJECTION, SOLUTION INTRAMUSCULAR; INTRAVENOUS AS NEEDED
Status: DISCONTINUED | OUTPATIENT
Start: 2025-01-14 | End: 2025-01-14

## 2025-01-14 RX ORDER — BUPIVACAINE HYDROCHLORIDE 5 MG/ML
INJECTION, SOLUTION EPIDURAL; INTRACAUDAL AS NEEDED
Status: DISCONTINUED | OUTPATIENT
Start: 2025-01-14 | End: 2025-01-14 | Stop reason: HOSPADM

## 2025-01-14 RX ORDER — METRONIDAZOLE 500 MG/100ML
500 INJECTION, SOLUTION INTRAVENOUS ONCE
Status: COMPLETED | OUTPATIENT
Start: 2025-01-14 | End: 2025-01-14

## 2025-01-14 RX ORDER — FAMOTIDINE 10 MG/ML
20 INJECTION, SOLUTION INTRAVENOUS 2 TIMES DAILY
Status: DISCONTINUED | OUTPATIENT
Start: 2025-01-14 | End: 2025-01-15 | Stop reason: HOSPADM

## 2025-01-14 RX ORDER — DIPHENHYDRAMINE HCL 25 MG
25 TABLET ORAL EVERY 8 HOURS PRN
Status: DISCONTINUED | OUTPATIENT
Start: 2025-01-14 | End: 2025-01-15 | Stop reason: HOSPADM

## 2025-01-14 RX ORDER — MORPHINE SULFATE 4 MG/ML
4 INJECTION, SOLUTION INTRAMUSCULAR; INTRAVENOUS EVERY 4 HOURS PRN
Status: DISCONTINUED | OUTPATIENT
Start: 2025-01-14 | End: 2025-01-15 | Stop reason: HOSPADM

## 2025-01-14 RX ORDER — MIDAZOLAM HYDROCHLORIDE 2 MG/2ML
INJECTION, SOLUTION INTRAMUSCULAR; INTRAVENOUS AS NEEDED
Status: DISCONTINUED | OUTPATIENT
Start: 2025-01-14 | End: 2025-01-14

## 2025-01-14 RX ORDER — PROMETHAZINE HYDROCHLORIDE 25 MG/ML
25 INJECTION, SOLUTION INTRAMUSCULAR; INTRAVENOUS EVERY 6 HOURS PRN
Status: DISCONTINUED | OUTPATIENT
Start: 2025-01-14 | End: 2025-01-15 | Stop reason: HOSPADM

## 2025-01-14 RX ORDER — HYDROMORPHONE HCL/PF 1 MG/ML
SYRINGE (ML) INJECTION AS NEEDED
Status: DISCONTINUED | OUTPATIENT
Start: 2025-01-14 | End: 2025-01-14

## 2025-01-14 RX ORDER — SODIUM CHLORIDE 9 MG/ML
125 INJECTION, SOLUTION INTRAVENOUS CONTINUOUS
Status: DISCONTINUED | OUTPATIENT
Start: 2025-01-14 | End: 2025-01-15 | Stop reason: HOSPADM

## 2025-01-14 RX ORDER — OXYCODONE HCL 5 MG/5 ML
10 SOLUTION, ORAL ORAL EVERY 4 HOURS PRN
Status: DISCONTINUED | OUTPATIENT
Start: 2025-01-14 | End: 2025-01-15 | Stop reason: HOSPADM

## 2025-01-14 RX ORDER — PROPOFOL 10 MG/ML
INJECTION, EMULSION INTRAVENOUS AS NEEDED
Status: DISCONTINUED | OUTPATIENT
Start: 2025-01-14 | End: 2025-01-14

## 2025-01-14 RX ORDER — PHENYLEPHRINE HCL IN 0.9% NACL 1 MG/10 ML
SYRINGE (ML) INTRAVENOUS AS NEEDED
Status: DISCONTINUED | OUTPATIENT
Start: 2025-01-14 | End: 2025-01-14

## 2025-01-14 RX ORDER — ONDANSETRON 2 MG/ML
INJECTION INTRAMUSCULAR; INTRAVENOUS AS NEEDED
Status: DISCONTINUED | OUTPATIENT
Start: 2025-01-14 | End: 2025-01-14

## 2025-01-14 RX ADMIN — MIDAZOLAM 2 MG: 1 INJECTION INTRAMUSCULAR; INTRAVENOUS at 11:54

## 2025-01-14 RX ADMIN — Medication 100 MCG: at 12:41

## 2025-01-14 RX ADMIN — PROPOFOL 200 MG: 10 INJECTION, EMULSION INTRAVENOUS at 11:57

## 2025-01-14 RX ADMIN — CELECOXIB 200 MG: 100 CAPSULE ORAL at 10:05

## 2025-01-14 RX ADMIN — Medication 100 MCG: at 13:59

## 2025-01-14 RX ADMIN — DEXMEDETOMIDINE HYDROCHLORIDE 4 MCG: 100 INJECTION, SOLUTION INTRAVENOUS at 11:54

## 2025-01-14 RX ADMIN — Medication 100 MCG: at 13:47

## 2025-01-14 RX ADMIN — DEXAMETHASONE SODIUM PHOSPHATE 10 MG: 10 INJECTION INTRAMUSCULAR; INTRAVENOUS at 11:58

## 2025-01-14 RX ADMIN — ROCURONIUM 15 MG: 50 INJECTION, SOLUTION INTRAVENOUS at 13:35

## 2025-01-14 RX ADMIN — CEFAZOLIN SODIUM 2000 MG: 2 SOLUTION INTRAVENOUS at 19:32

## 2025-01-14 RX ADMIN — FOSAPREPITANT DIMEGLUMINE 150 MG: 150 INJECTION, POWDER, LYOPHILIZED, FOR SOLUTION INTRAVENOUS at 10:23

## 2025-01-14 RX ADMIN — DEXMEDETOMIDINE HYDROCHLORIDE 4 MCG: 100 INJECTION, SOLUTION INTRAVENOUS at 12:20

## 2025-01-14 RX ADMIN — Medication 100 MCG: at 12:05

## 2025-01-14 RX ADMIN — ACETAMINOPHEN 1000 MG: 10 INJECTION INTRAVENOUS at 17:30

## 2025-01-14 RX ADMIN — FAMOTIDINE 20 MG: 10 INJECTION, SOLUTION INTRAVENOUS at 20:52

## 2025-01-14 RX ADMIN — SODIUM CHLORIDE 125 ML/HR: 0.9 INJECTION, SOLUTION INTRAVENOUS at 10:22

## 2025-01-14 RX ADMIN — SODIUM CHLORIDE, SODIUM LACTATE, POTASSIUM CHLORIDE, AND CALCIUM CHLORIDE 100 ML/HR: .6; .31; .03; .02 INJECTION, SOLUTION INTRAVENOUS at 16:24

## 2025-01-14 RX ADMIN — ROCURONIUM 20 MG: 50 INJECTION, SOLUTION INTRAVENOUS at 12:55

## 2025-01-14 RX ADMIN — SODIUM CHLORIDE: 0.9 INJECTION, SOLUTION INTRAVENOUS at 12:41

## 2025-01-14 RX ADMIN — FENTANYL CITRATE 50 MCG: 50 INJECTION, SOLUTION INTRAMUSCULAR; INTRAVENOUS at 11:54

## 2025-01-14 RX ADMIN — HYDROMORPHONE HYDROCHLORIDE 0.5 MG: 1 INJECTION, SOLUTION INTRAMUSCULAR; INTRAVENOUS; SUBCUTANEOUS at 13:23

## 2025-01-14 RX ADMIN — Medication 100 MCG: at 13:22

## 2025-01-14 RX ADMIN — EPHEDRINE SULFATE 5 MG: 50 INJECTION INTRAVENOUS at 12:41

## 2025-01-14 RX ADMIN — METRONIDAZOLE: 500 INJECTION, SOLUTION INTRAVENOUS at 12:10

## 2025-01-14 RX ADMIN — OXYCODONE HYDROCHLORIDE 10 MG: 5 SOLUTION ORAL at 19:45

## 2025-01-14 RX ADMIN — ROCURONIUM 50 MG: 50 INJECTION, SOLUTION INTRAVENOUS at 11:58

## 2025-01-14 RX ADMIN — ROCURONIUM 15 MG: 50 INJECTION, SOLUTION INTRAVENOUS at 13:45

## 2025-01-14 RX ADMIN — FENTANYL CITRATE 50 MCG: 50 INJECTION, SOLUTION INTRAMUSCULAR; INTRAVENOUS at 12:20

## 2025-01-14 RX ADMIN — HYDROMORPHONE HYDROCHLORIDE 0.5 MG: 1 INJECTION, SOLUTION INTRAMUSCULAR; INTRAVENOUS; SUBCUTANEOUS at 14:11

## 2025-01-14 RX ADMIN — HEPARIN SODIUM 5000 UNITS: 5000 INJECTION INTRAVENOUS; SUBCUTANEOUS at 10:05

## 2025-01-14 RX ADMIN — Medication 100 MCG: at 13:31

## 2025-01-14 RX ADMIN — Medication 100 MCG: at 13:26

## 2025-01-14 RX ADMIN — DEXMEDETOMIDINE HYDROCHLORIDE 4 MCG: 100 INJECTION, SOLUTION INTRAVENOUS at 12:30

## 2025-01-14 RX ADMIN — EPHEDRINE SULFATE 5 MG: 50 INJECTION INTRAVENOUS at 14:04

## 2025-01-14 RX ADMIN — SODIUM CHLORIDE: 0.9 INJECTION, SOLUTION INTRAVENOUS at 14:25

## 2025-01-14 RX ADMIN — SUGAMMADEX 200 MG: 100 INJECTION, SOLUTION INTRAVENOUS at 15:14

## 2025-01-14 RX ADMIN — METRONIDAZOLE 500 MG: 500 INJECTION, SOLUTION INTRAVENOUS at 20:27

## 2025-01-14 RX ADMIN — LIDOCAINE HYDROCHLORIDE 100 MG: 20 INJECTION, SOLUTION EPIDURAL; INFILTRATION; INTRACAUDAL at 11:57

## 2025-01-14 RX ADMIN — ONDANSETRON 4 MG: 2 INJECTION INTRAMUSCULAR; INTRAVENOUS at 14:10

## 2025-01-14 RX ADMIN — Medication 100 MCG: at 12:17

## 2025-01-14 RX ADMIN — CEFAZOLIN SODIUM 2000 MG: 2 SOLUTION INTRAVENOUS at 12:00

## 2025-01-14 RX ADMIN — ACETAMINOPHEN 1000 MG: 10 INJECTION INTRAVENOUS at 12:00

## 2025-01-14 NOTE — ANESTHESIA PREPROCEDURE EVALUATION
Procedure:  REVISION BYPASS LAPAROSCOPIC (Abdomen)    Relevant Problems   ANESTHESIA   (+) PONV (postoperative nausea and vomiting)      CARDIO (within normal limits)      PULMONARY (within normal limits)        Physical Exam    Airway    Mallampati score: II  TM Distance: >3 FB  Neck ROM: full     Dental   No notable dental hx     Cardiovascular  Rhythm: regular, Rate: normal, Cardiovascular exam normal    Pulmonary  Pulmonary exam normal Breath sounds clear to auscultation    Other Findings  post-pubertal.      Anesthesia Plan  ASA Score- 3     Anesthesia Type- general with ASA Monitors.         Additional Monitors:     Airway Plan: ETT.           Plan Factors-Exercise tolerance (METS): >4 METS.    Chart reviewed.   Existing labs reviewed. Patient summary reviewed.    Patient is not a current smoker.              Induction- intravenous.    Postoperative Plan-         Informed Consent- Anesthetic plan and risks discussed with patient.        NPO Status:  No vitals data found for the desired time range.

## 2025-01-14 NOTE — OP NOTE
[facility]   [address] [place] [address], [facility] [address] [facility], [address] [phone] (Fax)      Operative Report  REVISION BYPASS LAPAROSCOPIC DONOVAN-EN-Y WITH INTRA-OPERATIVE EGD     Patient Name: Steph Ceron    :  1985  MRN: [id number]  Patient Location: [facility] ROOM 07  Surgery Date : 2025  Surgeons:  Surgeons and Role:     * Neel Clark MD - Primary     * Дмитрий Wolfe MD - Assistant    Diagnosis:    Pre-Op Diagnosis Codes:  Weight gain [R63.5]  Esophagitis [K20.90]  Body mass index is 36.03 kg/m².      Post-Op Diagnosis Codes:     * Weight gain [R63.5]     * Esophagitis [K20.90]     * Body mass index is 36.03 kg/m².      Procedure    Diagnostic Laparoscopy  Extensive Lysis of Adhesions  Gastric Pouch reduction  Laparoscopic Revision of Sleeve Gastrectomy to a Donovan-en-Y Gastric Bypass  Intraoperative Endoscopy    Specimen(s):  ID Type Source Tests Collected by Time Destination   1 : Segment of Pouch Tissue Stomach TISSUE EXAM Neel Clark MD 2025 [id number]        Estimated Blood Loss:    50 cc    Anesthesia Type:     General    Operative Indications:    Weight gain [R63.5]  Esophagitis [K20.90]      Operative Findings:    Multiple adhesions involving the sleeve pouch to the undersurface of the liver and retroperitoneal structures    Complications:     None    Procedure and Technique:    INDICATION:    39 y.o. female s/p Vertical Sleeve Gastrectomy with Dr Alvarez in 2017 at [facility] in [place]. Presents to the office last year with weight regain.     Her highest weight prior to surgery was around 235 lbs and lowest was 187 lbs about 1.5 years postop. Denies any postop complications. She reports started regaining weight gradually around 2018 despite exercise. In  she started gaining more significant amount of weight. Overall states she eats a healthy diet.      She was interested in revision if appropriate. She states initially the surgeon had  recommended bypass but patient ultimately chose the sleeve.      I have reviewed myself the upper GI images that were obtained and the study revealed Status post gastric sleeve procedure. Possible mild reexpansion of gastric volume but this is uncertain without any postoperative comparison imaging.     She then underwent a EGD that revealed esophagitis and slight prominent upper portion of the sleeve.     She has been pre certified to undergo a Laparoscopic conversion from sleeve gastrectomy to a Kingsley-en-Y gastric bypass.    OPERATIVE TECHNIQUE    The patient was taken to the operating room and placed in a supine position. A dose of IV antibiotic prophylaxis that consisted of Ancef 2g and Metronidazole 500mg were given. Also, 5000 units of subcutaneous unfractionated heparin to prevent DVT were administered. Sequential compression devices were placed on both lower extremities.     After satisfactory general anesthesia induction and endotracheal intubation was achieved, the extremities were secured to prevent neurovascular and musculoskeletal injuries as best as possible. Subsequently, the abdominal wall was prepped and draped in a surgical standard sterile fashion.  After a timeout was done and the patient was properly identified and the type of procedure was confirmed access to the peritoneal cavity was gained with standard Veress needle technique. Pneumoperitoneum was then established with CO2 insufflation.      A four quadrant transversus abdominis plane block was performed under direct laparoscopic vision.  After this was completed four additional trocars were placed: a 12 mm in the right upper quadrant subcostal position in the anterior axillary line, a 12-mm port was placed in the right flank midclavicular line, a 12-mm port was placed in the left upper quadrant subcostal position in the midclavicular line and another 12-mm port was placed in the left quadrant anterior axillary line lateral to the  supraumbilical port.    With the trocars in place, the dissection was begun.    At this point we repositioned the patient into a reverse Trendelenburg and the Perry liver retractor was placed in the subxiphoid position through the use of a 5-mm trocar incision.    We elevated the liver and encountered multiple dense adhesions involving the anterior aspect of the sleeve pouch to the undersurface of the liver into the retroperitoneal structures. These were taken down with a combination of sharp and energy dissection looking to identify the structures. This process was tedious and took about 30 minutes to accomplish.      The pars flaccida was identified and incised. The lesser sac was entered below the lesser curve at the level just inferior to the take off of the left gastric artery. The left gastric artery and hepatic vagal branches were preserved.    We then created aproximately a 30 cc gastric pouch. To accomplish this, serial firings of a laparoscopic stapler 60-mm cartridge were utilized. The staple lines were reinforced with a butressing material. We created a pouch based on the lesser curve and in vertical orientation.   This was accomplished by a transverse firing of the stapler along the inferior edge of the pouch and then vertical serial firings of the stapler to the angle of His.   This way we did a gastric pouch reduction as it was found to be prominent on the preop endoscopy.  This segment of pouch was sent to pathology for further examination.  Upon completing the stapling, this completely  the pouch from the gastric remnant. A 25 mm circular stapler anvil was passed through the mouth and into the esophagus and subsequently placed within the pouch. The inferior edge of the pouch was then opened with cautery and the anvil stem was brought through the anterior aspect of the pouch close to the staple line.     We proceeded to divide the omentum all the way to the transverse colon and  subsequently elevated, this allowed for the ligament of Treitz to be visualized. The small bowel was run about 60 cm to a point distal from the ligament of Treitz and was divided with a stapler and a 60 mm cartridge. The Kingsley limb was then measured at 100 cm, and the 100 cm kendell was brought in side-to-side opposition to the biliopancreatic limb. A side-to-side jejunojejunostomy was then created. This was accomplished by first making an antimesenteric enterotomy with the cautery energy device. We then positioned the laparoscopic stapler with a 60-mm cartridge within the lumen of the bowel to create a stapled side-to-side anastomosis.   The enterotomy was then approximated with a 2-0 silk suture, subsequently elevated and the stapler was then reloaded and positioned perpendicularly to the first staple lines, just below the margin of the enterotomy on the antimesenteric border of the bowel and closed transversely utilizing an additional 60-mm cartridge. The resulting mesenteric defect was then closed with a running nonabsorbable suture.   A Brolin stitch was placed to prevent kinking.    The end of the Kingsley limb was opened with the cautery and the circular stapling device was brought through the dilated left upper quadrant 12-mm port site, and passed through the open end of the Kingsley limb. The Kingsley limb was then passed in a antecolic and antegastric position to the pouch. This was accomplished without tension and without twist. The stem of the stapling device was then brought through the antimesenteric border of the Kingsley limb adjacent to the pouch. The stem and the anvil were united and the stapler was fired. This created an end-to-side gastrojejunostomy. The excess Kingsley limb proximal to the anastomosis was then resected with the cautery energy device and a laparoscopic stapler with a 60-mm cartridge.      The anastomosis was reinforced with interrupted absorbable sutures at the intersection of the staple lines. The  distal Kingsley limb was occluded and an EGD as well as an air insufflation test were performed. No intraoperative bleeding nor leaks were detected.      The whole G-J anastomotic area was covered with a tongue of omentum in a Darion patch fashion.    The sponge, needle and instrument count was reported complete after confirmation with an Xray.    The 12-mm port site on the left flank that was dilated for the circular stapler as well as the Visiport trocar were then closed with the use of a suture closure device and a 0 absorbable suture. The liver retractor was removed under direct laparoscopic visualization, and no bleeding was noted.    The remaining ports were then also removed under laparoscopic visualization. The skin incisions were all closed with 4-0 absorbable subcuticular suture.    The patient tolerated the procedure well, was extubated uneventfully and was transferred to the recovery room in stable condition.    I was present for the entire length of the procedure as the attending of record.  No qualified resident was available to assist.  The presence of an assistant was necessary for camera holding, traction and counter traction and for help with suturing and stapling in addition to performing the intraop-EGD.    Patient Disposition:    PACU     Signature: Neel Clark MD  Date: January 14, 2025  Time: 3:03 PM

## 2025-01-14 NOTE — ANESTHESIA POSTPROCEDURE EVALUATION
Post-Op Assessment Note    CV Status:  Stable  Pain Score: 0    Pain management: adequate       Mental Status:  Sleepy and arousable   Hydration Status:  Stable   PONV Controlled:  None   Airway Patency:  Patent  Airway: intubated     Post Op Vitals Reviewed: Yes    No anethesia notable event occurred.    Staff: Anesthesiologist, CRNA           Last Filed PACU Vitals:  Vitals Value Taken Time   Temp 97.3 °F (36.3 °C) 01/14/25 1525   Pulse 90 01/14/25 1530   /71 01/14/25 1526   Resp 15 01/14/25 1525   SpO2 100 % 01/14/25 1530   Vitals shown include unfiled device data.    Modified Konstantin:     Vitals Value Taken Time   Activity 2 01/14/25 1525   Respiration 2 01/14/25 1525   Circulation 2 01/14/25 1525   Consciousness 1 01/14/25 1525   Oxygen Saturation 1 01/14/25 1525     Modified Konstantin Score: 8

## 2025-01-14 NOTE — INTERVAL H&P NOTE
H&P reviewed. After examining the patient I find no changes in the patients condition since the H&P had been written.    Vitals:    01/14/25 0955   BP: 131/78   Pulse: 75   Resp: 18   Temp: (!) 97.2 °F (36.2 °C)   SpO2: 95%

## 2025-01-15 VITALS
SYSTOLIC BLOOD PRESSURE: 136 MMHG | HEART RATE: 70 BPM | TEMPERATURE: 98.2 F | DIASTOLIC BLOOD PRESSURE: 85 MMHG | HEIGHT: 65 IN | WEIGHT: 216.49 LBS | BODY MASS INDEX: 36.07 KG/M2 | OXYGEN SATURATION: 99 % | RESPIRATION RATE: 19 BRPM

## 2025-01-15 LAB
ANION GAP SERPL CALCULATED.3IONS-SCNC: 12 MMOL/L (ref 4–13)
BUN SERPL-MCNC: 6 MG/DL (ref 5–25)
CALCIUM SERPL-MCNC: 7.7 MG/DL (ref 8.4–10.2)
CHLORIDE SERPL-SCNC: 108 MMOL/L (ref 96–108)
CO2 SERPL-SCNC: 18 MMOL/L (ref 21–32)
CREAT SERPL-MCNC: 0.56 MG/DL (ref 0.6–1.3)
ERYTHROCYTE [DISTWIDTH] IN BLOOD BY AUTOMATED COUNT: 13.4 % (ref 11.6–15.1)
GFR SERPL CREATININE-BSD FRML MDRD: 119 ML/MIN/1.73SQ M
GLUCOSE SERPL-MCNC: 154 MG/DL (ref 65–140)
HCT VFR BLD AUTO: 37 % (ref 34.8–46.1)
HGB BLD-MCNC: 12 G/DL (ref 11.5–15.4)
MCH RBC QN AUTO: 29.2 PG (ref 26.8–34.3)
MCHC RBC AUTO-ENTMCNC: 32.4 G/DL (ref 31.4–37.4)
MCV RBC AUTO: 90 FL (ref 82–98)
PLATELET # BLD AUTO: 201 THOUSANDS/UL (ref 149–390)
PMV BLD AUTO: 12 FL (ref 8.9–12.7)
POTASSIUM SERPL-SCNC: 4.1 MMOL/L (ref 3.5–5.3)
RBC # BLD AUTO: 4.11 MILLION/UL (ref 3.81–5.12)
SODIUM SERPL-SCNC: 138 MMOL/L (ref 135–147)
WBC # BLD AUTO: 11.44 THOUSAND/UL (ref 4.31–10.16)

## 2025-01-15 PROCEDURE — 85027 COMPLETE CBC AUTOMATED: CPT | Performed by: STUDENT IN AN ORGANIZED HEALTH CARE EDUCATION/TRAINING PROGRAM

## 2025-01-15 PROCEDURE — 80048 BASIC METABOLIC PNL TOTAL CA: CPT | Performed by: STUDENT IN AN ORGANIZED HEALTH CARE EDUCATION/TRAINING PROGRAM

## 2025-01-15 PROCEDURE — NC001 PR NO CHARGE: Performed by: STUDENT IN AN ORGANIZED HEALTH CARE EDUCATION/TRAINING PROGRAM

## 2025-01-15 PROCEDURE — 99024 POSTOP FOLLOW-UP VISIT: CPT | Performed by: STUDENT IN AN ORGANIZED HEALTH CARE EDUCATION/TRAINING PROGRAM

## 2025-01-15 RX ORDER — OXYCODONE HYDROCHLORIDE 5 MG/1
5 TABLET ORAL EVERY 4 HOURS PRN
Qty: 5 TABLET | Refills: 0 | Status: SHIPPED | OUTPATIENT
Start: 2025-01-15

## 2025-01-15 RX ADMIN — METRONIDAZOLE 500 MG: 500 INJECTION, SOLUTION INTRAVENOUS at 05:24

## 2025-01-15 RX ADMIN — FAMOTIDINE 20 MG: 10 INJECTION, SOLUTION INTRAVENOUS at 09:09

## 2025-01-15 RX ADMIN — ACETAMINOPHEN 1000 MG: 10 INJECTION INTRAVENOUS at 00:11

## 2025-01-15 RX ADMIN — SIMETHICONE 80 MG: 80 TABLET, CHEWABLE ORAL at 05:48

## 2025-01-15 RX ADMIN — OXYCODONE HYDROCHLORIDE 10 MG: 5 SOLUTION ORAL at 09:16

## 2025-01-15 RX ADMIN — CEFAZOLIN SODIUM 2000 MG: 2 SOLUTION INTRAVENOUS at 05:22

## 2025-01-15 RX ADMIN — ACETAMINOPHEN 1000 MG: 10 INJECTION INTRAVENOUS at 06:39

## 2025-01-15 NOTE — DISCHARGE SUMMARY
Discharge Summary - Steph Ceron 39 y.o. female MRN: [id number]    Unit/Bed#: E5 -01 Encounter: [id number]      Pre-Operative Diagnosis: Pre-Op Diagnosis Codes:      * Weight gain [R63.5]     * Esophagitis [K20.90]    Post-Operative Diagnosis: Post-Op Diagnosis Codes:     * Weight gain [R63.5]     * Esophagitis [K20.90]    Procedures Performed:  Procedure(s):  REVISION BYPASS LAPAROSCOPIC DONOVAN-EN-Y WITH INTRA-OPERATIVE EGD    Surgeon: Neel Clark MD    See H & P for full details of admission and Operative Note for full details of operations performed.     Patient tolerated surgery well without complications. In the morning postoperative Day 1, the patient had mild nausea and abdominal pain. Tolerated a clear liquid diet without vomiting. Able to ambulate and voiding independently. Patient was deemed ready for discharge home.     Patient was seen and examined prior to discharge.      Provisions for Follow-Up Care:  See After Visit Summary/Discharge Instructions for information related to follow-up care and home orders.      Disposition: Home, in stable condition.     Planned Readmission: No    Discharge Medications:  See After Visit Summary/Discharge Instructions for reconciled discharge medications provided to patient and family.      Post Operative instructions: Reviewed with patient and/or family.    Signature:   Дмитрий Wolfe MD  Date: 1/15/2025 Time: 9:12 AM

## 2025-01-15 NOTE — UTILIZATION REVIEW
Initial Clinical Review    Elective   IP   surgical procedure    Age/Sex: 39 y.o. female    Surgery Date:   1/14/25    Procedure: Diagnostic Laparoscopy  Extensive Lysis of Adhesions  Gastric Pouch reduction  Laparoscopic Revision of Sleeve Gastrectomy to a Kingsley-en-Y Gastric Bypass  Intraoperative Endoscopy    Anesthesia:   general    Operative Findings: Multiple adhesions involving the sleeve pouch to the undersurface of the liver and retroperitoneal structures     POD#1 Progress Note:   1/15  D/C  home    Admission Orders: Date/Time/Statement:   Admission Orders (From admission, onward)       Ordered        01/14/25 1119  Inpatient Admission  Once                          Orders Placed This Encounter   Procedures    Inpatient Admission     Standing Status:   Standing     Number of Occurrences:   1     Level of Care:   Med Surg [16]     Bed Type:   Bariatric [1]     Estimated length of stay:   Inpatient Only Surgery     Diet:  bariatric  cl liq    Mobility:   OOB  as  tolerated    DVT Prophylaxis:   SCD's    Medications/Pain Control:   Scheduled Medications:  acetaminophen, 1,000 mg, Intravenous, Q6H KARLA  famotidine, 20 mg, Intravenous, BID      Continuous IV Infusions:  lactated ringers, 100 mL/hr, Intravenous, Continuous  sodium chloride, 125 mL/hr, Intravenous, Continuous      PRN Meds:  diphenhydrAMINE, 25 mg, Oral, Q8H PRN  morphine injection, 4 mg, Intravenous, Q4H PRN  ondansetron, 4 mg, Intravenous, Q6H PRN  oxyCODONE, 10 mg, Oral, Q4H PRN  oxyCODONE, 5 mg, Oral, Q4H PRN  phenol, 2 spray, Mouth/Throat, Q2H PRN  promethazine, 25 mg, Intravenous, Q6H PRN  simethicone, 80 mg, Oral, 4x Daily PRN      Vital Signs (last 3 days)       Date/Time Temp Pulse Resp BP MAP (mmHg) SpO2 O2 Flow Rate (L/min) O2 Device Pain    01/15/25 0916 -- -- -- -- -- -- -- -- 8    01/15/25 09:05:54 -- 70 -- 136/85 102 99 % -- -- --    01/15/25 09:04:57 -- -- -- 136/85 102 -- -- -- --    01/15/25 07:33:08 98.2 °F (36.8 °C) 69 19 120/77  91 98 % -- -- --    01/15/25 03:13:15 98.1 °F (36.7 °C) 73 -- 112/71 85 98 % -- -- --    01/15/25 0100 -- -- -- -- -- -- -- None (Room air) --    01/14/25 22:55:41 97.8 °F (36.6 °C) 79 -- 123/78 93 98 % -- -- --    01/14/25 22:55:31 97.8 °F (36.6 °C) -- -- 123/78 93 -- -- -- --    01/14/25 1945 -- -- -- -- -- -- -- -- 8    01/14/25 19:16:55 97.2 °F (36.2 °C) -- 17 117/78 91 -- -- -- --    01/14/25 1700 -- 77 -- 112/73 86 97 % -- -- --    01/14/25 1645 -- 89 -- 111/72 85 95 % -- -- --    01/14/25 1630 -- 82 -- 113/69 84 95 % -- -- --    01/14/25 16:10:10 97.4 °F (36.3 °C) 86 -- 114/70 85 96 % -- -- --    01/14/25 1555 97.5 °F (36.4 °C) 96 15 115/67 85 94 % -- None (Room air) No Pain    01/14/25 1540 -- 96 17 120/68 89 94 % -- None (Room air) No Pain    01/14/25 1525 97.3 °F (36.3 °C) 101 15 123/71 91 100 % 6 L/min Simple mask --    01/14/25 1051 -- -- -- -- -- -- -- -- No Pain    01/14/25 0955 97.2 °F (36.2 °C) 75 18 131/78 -- 95 % -- None (Room air) --    01/14/25 0951 -- -- -- -- -- -- -- -- 3          Weight (last 2 days)       Date/Time Weight    01/14/25 0951 98.2 (216.49)            Pertinent Labs/Diagnostic Test Results:   Radiology:  XR abdomen 1 view kub   Final Interpretation by Jimenez Barrera MD (01/14 1513)      No radiopaque foreign body identified.               Workstation performed: [id number]                 Results from last 7 days   Lab Units 01/15/25  0500   WBC Thousand/uL 11.44*   HEMOGLOBIN g/dL 12.0   HEMATOCRIT % 37.0   PLATELETS Thousands/uL 201         Results from last 7 days   Lab Units 01/15/25  0500   SODIUM mmol/L 138   POTASSIUM mmol/L 4.1   CHLORIDE mmol/L 108   CO2 mmol/L 18*   ANION GAP mmol/L 12   BUN mg/dL 6   CREATININE mg/dL 0.56*   EGFR ml/min/1.73sq m 119   CALCIUM mg/dL 7.7*             Results from last 7 days   Lab Units 01/15/25  0500   GLUCOSE RANDOM mg/dL 154*                 Network Utilization Review Department  ATTENTION: Please call with any questions or  concerns to [phone] and carefully listen to the prompts so that you are directed to the right person. All voicemails are confidential.   For Discharge needs, contact Care Management DC Support Team at [phone] opt. [phone]  Send all requests for admission clinical reviews, approved or denied determinations and any other requests to dedicated fax number below belonging to the [facility] where the patient is receiving treatment. List of dedicated fax numbers for the Facilities:  FACILITY NAME UR FAX NUMBER   ADMISSION DENIALS (Administrative/Medical Necessity) [phone]   DISCHARGE SUPPORT TEAM (NETWORK) [phone]   PARENT CHILD HEALTH (Maternity/NICU/Pediatrics) [phone]   [facility] [phone]   [facility] [phone]   [facility] [phone]   [facility] [phone]   [facility] [phone]   [facility] [phone]   [facility] [phone]   [facility] [phone]   [facility] [phone]   [facility] [phone]   [facility] [phone]   [facility] [phone]

## 2025-01-15 NOTE — ANESTHESIA POSTPROCEDURE EVALUATION
Post-Op Assessment Note    CV Status:  Stable  Pain Score: 1    Pain management: adequate       Mental Status:  Alert and awake   Hydration Status:  Euvolemic   PONV Controlled:  Controlled   Airway Patency:  Patent     Post Op Vitals Reviewed: Yes    No anethesia notable event occurred.    Staff: Anesthesiologist           Last Filed PACU Vitals:  Vitals Value Taken Time   Temp 97.4 °F (36.3 °C) 01/14/25 1610   Pulse 85 01/14/25 1614   /70 01/14/25 1610   Resp 15 01/14/25 1555   SpO2 96 % 01/14/25 1614   Vitals shown include unfiled device data.    Modified Konstantin:     Vitals Value Taken Time   Activity 2 01/14/25 1555   Respiration 2 01/14/25 1555   Circulation 2 01/14/25 1555   Consciousness 1 01/14/25 1555   Oxygen Saturation 2 01/14/25 1555     Modified Konstantin Score: 9

## 2025-01-15 NOTE — DISCHARGE INSTR - AVS FIRST PAGE
Bariatric/Weight Loss Surgery  Hospital Discharge Instructions  ACTIVITY:  Progress as feels comfortable - a good rule is:  if you are doing something and it begins to hurt, stop doing the activity. Walk every hour while at home.  You may walk stairs if you do so slowly  You may shower 48 hours after surgery.  Do not scrub incision sites.  Blot gently with clean towel to dry incisions. (see #4 below)   Use your incentive spirometer 10 times per hour while awake for 1 week after surgery.  Do NOT drive for 48 hours after surgery. No driving 24 hours after taking certain prescription pain medications. Examples of such medication are Percocet, Darvocet, Oxycodone, Tylenol #3, and Tylenol with Codeine.     DIET  Stay on a liquid diet for 7 days after your surgery date, sipping slowly. Refer to your manual for examples of choices. Remember to keep your liquids sugar free or low calorie. You may have protein drinks. Make sure to drink 48 to 64 ounces per day of fluids.   You may advance to a pureed diet one week after surgery as instructed by your diet progression pamphlet. Once you get approval from your surgeon at your first post operative visit, you may advance to the soft diet and remain on soft diet for 8 weeks unless otherwise instructed.    MEDICATIONS:  The abdominal nerve block will wear off during the first 1-2 days that you are home, and you may become sore (especially over incision site/sites where abdominal wall is sutured). This may create a pulling sensation, especially while moving around, and will fade over time.  Continue to take your Tylenol and your pain medication as instructed.   Start vitamins and minerals one week after surgery or when you start stage 3/puree diet.   Anti-acid Medication as per prescription.  Other medications as indicated on the Physician Patient Discharge Instructions form given to you at the time of discharge.  You will need to consult with your Family Doctor in regards to all  your prescribed medication, particularly those for blood pressure and diabetes.  As you lose weight, medical conditions may change, requiring an alteration or elimination of the drug dose. Monitor blood pressure closely and call PCP with any concerns.   Sleeve Gastrectomy patients ONLY:  Complete full course of lovenox injections!  Females: DO NOT TAKE BIRTH CONTROL(BC) MEDICATIONS, INSERT BC VAGINAL RINGS, OR PLACE IUD OR ANY OTHER BC METHODS UNTIL 31 DAYS FROM DAY OF DISCHARGE FROM HOSPITAL. THIS PLACES YOU AT HIGH RISK FOR A POTENTIALLY LIFE THREATENING BLOOD CLOT. Remember to always use barrier methods for birth control and speak to your GYN about using two forms of birth control to start 31 days after surgery. It is very important to avoid pregnancy until at least 18-24 months after surgery.     INCISION CARE  You may shower and get incisions wet 2 days after surgery. No soaking tub baths or swimming for 30 days after surgery. Keep abdominal area and incisions clean. Use soap and water to create a good lather and rinse off.  Do not scrub incisions.   If you have a drain, empty the drain as the nurses instructed.    FOLLOW-UP APPOINTMENT should be made for one week after discharge. Call surgeon’s office at [phone] to schedule an appointment.    CALL YOUR DOCTOR FOR:  pain not controlled by pain medications, a temperature greater than 101.5° F, any increase or change in drainage or redness from any incision, any vomiting or inability to keep liquids down, shortness of breath, shoulder pain, or bleeding

## 2025-01-15 NOTE — PROGRESS NOTES
"Progress Note - Bariatric Surgery   Steph Ceron 39 y.o. female MRN: [id number]  Unit/Bed#: E5 -01 Encounter: [id number]      Subjective/Objective     Subjective:  Patient with class 2 obesity POD1 s/p laparoscopic conversion from sleeve gastrectomy to RNYGB.  Patient denies fevers, chills, sweats, SOB, CP, calf pain.  Pain adequately controlled on oral pain medication.  Ambulating without assistance, voiding well, and using incentive spirometer.  Patient tolerating liquid diet without nausea or vomiting today.  Vital signs stable.  CBC today shows appropriate Hgb and WBC counts.  BMP obtained today appropriate.        Objective:    /77   Pulse 69   Temp 98.2 °F (36.8 °C)   Resp 19   Ht 5' 5\" (1.651 m)   Wt 98.2 kg (216 lb 7.9 oz)   LMP 01/13/2025 (Exact Date)   SpO2 98%   BMI 36.03 kg/m²       Intake/Output Summary (Last 24 hours) at 1/15/2025 0830  Last data filed at 1/15/2025 0639  Gross per 24 hour   Intake 2300 ml   Output 1000 ml   Net 1300 ml       Invasive Devices       Peripheral Intravenous Line  Duration             Peripheral IV 01/14/25 Dorsal (posterior);Left Hand <1 day                    ROS: 10-point system completed. All negative except see HPI.    Physical Exam    General Appearance:    Alert, cooperative, no distress, appears stated age   Head:    Normocephalic, without obvious abnormality, atraumatic   Lungs:     Respirations unlabored   Heart:    Regular rate and rhythm   Abdomen:     Soft, appropriate tenderness, no masses, no organomegaly, non-distended   Extremities:   Extremities normal, atraumatic, no cyanosis or edema   Neurologic:  Incision:  Psych:   Normal strength and sensation    Clean, dry, and intact, no evidence of bleeding    Normal mood and affect       Lab, Imaging and other studies:I have personally reviewed pertinent lab results.  , CBC:   Lab Results   Component Value Date    WBC 11.44 (H) 01/15/2025    HGB 12.0 01/15/2025    HCT 37.0 01/15/2025    " MCV 90 01/15/2025     01/15/2025    RBC 4.11 01/15/2025    MCH 29.2 01/15/2025    MCHC 32.4 01/15/2025    RDW 13.4 01/15/2025    MPV 12.0 01/15/2025   , CMP:   Lab Results   Component Value Date    SODIUM 138 01/15/2025    K 4.1 01/15/2025     01/15/2025    CO2 18 (L) 01/15/2025    BUN 6 01/15/2025    CREATININE 0.56 (L) 01/15/2025    CALCIUM 7.7 (L) 01/15/2025    EGFR 119 01/15/2025        VTE Mechanical Prophylaxis: sequential compression device    Assessment/Plan  1)  Patient with class 2 Obesity s/p laparoscopic conversion from sleeve gastrectomy to RNYGB with stable post op course.  Patient afebrile and hemodynamically stable.     - Encourage PO fluids   - Recommend ambulation, use of SCDs when not ambulating, and incentive spirometry.   - Complete antibiotic course  - Plan to D/C patient home today pending anticipated progression    Plan of care was discussed with patient.  Care plan discussed with Dr. Amber Wolfe MD  Bariatric Surgery  1/15/2025  8:30 AM

## 2025-01-15 NOTE — PLAN OF CARE
Problem: PAIN - ADULT  Goal: Verbalizes/displays adequate comfort level or baseline comfort level  Description: Interventions:  - Encourage patient to monitor pain and request assistance  - Assess pain using appropriate pain scale  - Administer analgesics based on type and severity of pain and evaluate response  - Implement non-pharmacological measures as appropriate and evaluate response  - Consider cultural and social influences on pain and pain management  - Notify physician/advanced practitioner if interventions unsuccessful or patient reports new pain  Outcome: Progressing     Problem: INFECTION - ADULT  Goal: Absence or prevention of progression during hospitalization  Description: INTERVENTIONS:  - Assess and monitor for signs and symptoms of infection  - Monitor lab/diagnostic results  - Monitor all insertion sites, i.e. indwelling lines, tubes, and drains  - Monitor endotracheal if appropriate and nasal secretions for changes in amount and color  - [place] appropriate cooling/warming therapies per order  - Administer medications as ordered  - Instruct and encourage patient and family to use good hand hygiene technique  - Identify and instruct in appropriate isolation precautions for identified infection/condition  Outcome: Progressing     Problem: DISCHARGE PLANNING  Goal: Discharge to home or other facility with appropriate resources  Description: INTERVENTIONS:  - Identify barriers to discharge w/patient and caregiver  - Arrange for needed discharge resources and transportation as appropriate  - Identify discharge learning needs (meds, wound care, etc.)  - Arrange for interpretive services to assist at discharge as needed  - Refer to Case Management Department for coordinating discharge planning if the patient needs post-hospital services based on physician/advanced practitioner order or complex needs related to functional status, cognitive ability, or social support system  Outcome: Progressing      Problem: Knowledge Deficit  Goal: Patient/family/caregiver demonstrates understanding of disease process, treatment plan, medications, and discharge instructions  Description: Complete learning assessment and assess knowledge base.  Interventions:  - Provide teaching at level of understanding  - Provide teaching via preferred learning methods  Outcome: Progressing

## 2025-01-15 NOTE — PLAN OF CARE
Problem: PAIN - ADULT  Goal: Verbalizes/displays adequate comfort level or baseline comfort level  Description: Interventions:  - Encourage patient to monitor pain and request assistance  - Assess pain using appropriate pain scale  - Administer analgesics based on type and severity of pain and evaluate response  - Implement non-pharmacological measures as appropriate and evaluate response  - Consider cultural and social influences on pain and pain management  - Notify physician/advanced practitioner if interventions unsuccessful or patient reports new pain  Outcome: Progressing     Problem: INFECTION - ADULT  Goal: Absence or prevention of progression during hospitalization  Description: INTERVENTIONS:  - Assess and monitor for signs and symptoms of infection  - Monitor lab/diagnostic results  - Monitor all insertion sites, i.e. indwelling lines, tubes, and drains  - Monitor endotracheal if appropriate and nasal secretions for changes in amount and color  - [place] appropriate cooling/warming therapies per order  - Administer medications as ordered  - Instruct and encourage patient and family to use good hand hygiene technique  - Identify and instruct in appropriate isolation precautions for identified infection/condition  Outcome: Progressing     Problem: DISCHARGE PLANNING  Goal: Discharge to home or other facility with appropriate resources  Description: INTERVENTIONS:  - Identify barriers to discharge w/patient and caregiver  - Arrange for needed discharge resources and transportation as appropriate  - Identify discharge learning needs (meds, wound care, etc.)  - Arrange for interpretive services to assist at discharge as needed  - Refer to Case Management Department for coordinating discharge planning if the patient needs post-hospital services based on physician/advanced practitioner order or complex needs related to functional status, cognitive ability, or social support system  Outcome: Progressing      Problem: Knowledge Deficit  Goal: Patient/family/caregiver demonstrates understanding of disease process, treatment plan, medications, and discharge instructions  Description: Complete learning assessment and assess knowledge base.  Interventions:  - Provide teaching at level of understanding  - Provide teaching via preferred learning methods  Outcome: Progressing     Problem: Prexisting or High Potential for Compromised Skin Integrity  Goal: Skin integrity is maintained or improved  Description: INTERVENTIONS:  - Identify patients at risk for skin breakdown  - Assess and monitor skin integrity  - Assess and monitor nutrition and hydration status  - Monitor labs   - Assess for incontinence   - Turn and reposition patient  - Assist with mobility/ambulation  - Relieve pressure over bony prominences  - Avoid friction and shearing  - Provide appropriate hygiene as needed including keeping skin clean and dry  - Evaluate need for skin moisturizer/barrier cream  - Collaborate with interdisciplinary team   - Patient/family teaching  - Consider wound care consult   Outcome: Progressing

## 2025-01-15 NOTE — DISCHARGE INSTRUCTIONS
your medications from [facility] in Hospital Lobby   Take Tylenol every 8 hours around the clock, unless instructed otherwise  Take your omeprazole daily  It is important to stay hydrated and follow your discharge diet progression   Mild nausea is ok as long as you can drink fluids, sip very slowly and get up and walk during any periods of nausea  You may shower normally after 48 hours, but do not scrub incision sites, blot gently with clean towel to dry incisions  Take home medications as usual unless instructed otherwise while in hospital  Follow up with Dr. Clark and your PCP within the next week

## 2025-01-16 ENCOUNTER — TELEPHONE (OUTPATIENT)
Dept: MEDSURG UNIT | Facility: HOSPITAL | Age: 40
End: 2025-01-16

## 2025-01-16 ENCOUNTER — TRANSITIONAL CARE MANAGEMENT (OUTPATIENT)
Dept: FAMILY MEDICINE CLINIC | Facility: CLINIC | Age: 40
End: 2025-01-16

## 2025-01-17 ENCOUNTER — TELEPHONE (OUTPATIENT)
Dept: MEDSURG UNIT | Facility: HOSPITAL | Age: 40
End: 2025-01-17

## 2025-01-17 PROCEDURE — 88307 TISSUE EXAM BY PATHOLOGIST: CPT | Performed by: STUDENT IN AN ORGANIZED HEALTH CARE EDUCATION/TRAINING PROGRAM

## 2025-01-17 NOTE — TELEPHONE ENCOUNTER
Post op follow up phone call completed.  Pt is sipping liquids and has consumed 20 oz so far today.  Using IS as instructed, reinforced importance of using IS to help prevent pneumonia. Ambulating about home without difficulty.  Pain controlled with analgesia, used 1 oxycodone.  Reaffirmed examples of liquid diet over the next week.  Started miralax, passing gas, no BM yet.  Pt stated understanding about discharge instructions and medication adjustments.  Follow up appt with surgeon scheduled for next week.   Instructed to call with any additional questions or concerns.

## 2025-01-23 ENCOUNTER — CLINICAL SUPPORT (OUTPATIENT)
Dept: BARIATRICS | Facility: CLINIC | Age: 40
End: 2025-01-23

## 2025-01-23 ENCOUNTER — TELEPHONE (OUTPATIENT)
Age: 40
End: 2025-01-23

## 2025-01-23 ENCOUNTER — OFFICE VISIT (OUTPATIENT)
Dept: BARIATRICS | Facility: CLINIC | Age: 40
End: 2025-01-23

## 2025-01-23 VITALS
WEIGHT: 208.5 LBS | HEIGHT: 65 IN | DIASTOLIC BLOOD PRESSURE: 80 MMHG | SYSTOLIC BLOOD PRESSURE: 108 MMHG | HEART RATE: 99 BPM | TEMPERATURE: 97.6 F | BODY MASS INDEX: 34.74 KG/M2

## 2025-01-23 DIAGNOSIS — Z98.84 BARIATRIC SURGERY STATUS: Primary | ICD-10-CM

## 2025-01-23 DIAGNOSIS — E66.812 OBESITY, CLASS II, BMI 35-39.9: ICD-10-CM

## 2025-01-23 DIAGNOSIS — E66.811 OBESITY, CLASS I, BMI 30-34.9: ICD-10-CM

## 2025-01-23 DIAGNOSIS — Z48.89 POSTOPERATIVE VISIT: Primary | ICD-10-CM

## 2025-01-23 PROCEDURE — 99024 POSTOP FOLLOW-UP VISIT: CPT | Performed by: SURGERY

## 2025-01-23 PROCEDURE — RECHECK: Performed by: DIETITIAN, REGISTERED

## 2025-01-23 NOTE — PROGRESS NOTES
Weight Management Nutrition Class Virtual visit    Patient's name and  were verified during visit. Pt present in a state that I hold active licensure.   Provider explained that [employer] is a HIPPA compliant platform and to further protect their confidentiality the provider was alone and the office door was closed. Patient consented to the virtual video visit Patient consented to the [employer] visit.  This visit is free.     Diagnosis: Morbid Obesity    Bariatric Surgeon: Dr. Neel Clark    Surgery: Gastric Bypass Laparoscopic    Class: first post op note    Topics discussed today include:     fluid goals post op, protein goals post op, constipation, chew food well, PPI use, diet progression, protein supplems, vitamin/mineral supplements, calcium supplements, additional vitamin B12, and iron supplements    Patient was able to verbalize basic diet (protein, fluid, vitamin and mineral) recommendations and possible nutrition-related complications. Yes

## 2025-01-23 NOTE — TELEPHONE ENCOUNTER
Patient has a postop appointment today with Dr. Clark and requested an earlier appointment.     I attempted to transfer the call to the office as instructed, but after a long wait, someone hung up. Please call the patient at [phone]. Thank you.

## 2025-01-23 NOTE — PROGRESS NOTES
Date of surgery:1/13/2025  Procedure: RNY   Performing surgeon: Dr. Clark     Initial Weight - 216  Current Weight -208.5 lb  [place] Weight - now  Total Body Weight Loss (EWL)- 22.9%  EWL% - 28%  TWB % -10%

## 2025-01-23 NOTE — PROGRESS NOTES
POST OP UP VISIT - BARIATRIC SURGERY  Steph Ceron 39 y.o. female MRN: [id number]  Unit/Bed#:  Encounter: [id number]      HPI:  Stpeh Ceron is a 39 y.o. female status post laparoscopic conversion from sleeve gastrectomy to RNYGB performed by Dr. Roper on 1/14/2025 returning to office for first post op visit since surgery.    Tolerating diet.  Denies nausea and vomiting.  Taking multivitamins and PPI daily.      Review of Systems   Constitutional:  Negative for chills and fever.   HENT:  Negative for ear pain and sore throat.    Eyes:  Negative for pain and visual disturbance.   Respiratory:  Negative for cough and shortness of breath.    Cardiovascular:  Negative for chest pain and palpitations.   Gastrointestinal:  Negative for abdominal pain and vomiting.   Genitourinary:  Negative for dysuria and hematuria.   Musculoskeletal:  Negative for arthralgias and back pain.   Skin:  Negative for color change and rash.   Neurological:  Negative for seizures and syncope.   All other systems reviewed and are negative.      Historical Information   Past Medical History:   Diagnosis Date    Abnormal Pap smear of cervix     Anemia     GERD (gastroesophageal reflux disease)     History of sleep apnea     no CPAP since 2022     Past Surgical History:   Procedure Laterality Date    APPENDECTOMY      BACK SURGERY  12/09/2022    Lumbar    BARIATRIC SURGERY  2017    gastric sleeve    HYSTERECTOMY      LASIK      MS COLPOSCOPY CERVIX VAG LOOP ELTRD BX CERVIX N/A 09/15/2020    Procedure: BIOPSY LEEP CERVIX;  Surgeon: Abhishek Roy MD;  Location: [facility];  Service: Gynecology    MS LAPAROSCOPY W/RMVL ADNEXAL STRUCTURES Bilateral 09/15/2020    Procedure: SALPINGECTOMY, LAPAROSCOPIC;  Surgeon: Abhishek Roy MD;  Location: [facility];  Service: Gynecology    MS LAPS GSTRC RSTRICTIV PX LONGITUDINAL GASTRECTOMY N/A 1/14/2025    Procedure: REVISION BYPASS LAPAROSCOPIC DONOVAN-EN-Y WITH INTRA-OPERATIVE EGD;  Surgeon: Neel  MD Amber;  Location: [facility];  Service: Bariatrics    OH REMOVAL INTRAUTERINE DEVICE IUD N/A 09/15/2020    Procedure: REMOVAL OF INTRAUTERINE DEVICE (IUD);  Surgeon: Abhishek Roy MD;  Location:  MAIN OR;  Service: Gynecology    SPINE SURGERY  12/20/2022    UPPER GASTROINTESTINAL ENDOSCOPY  10/2023     Social History   Social History     Substance and Sexual Activity   Alcohol Use Not Currently     Social History     Substance and Sexual Activity   Drug Use Never     Social History     Tobacco Use   Smoking Status Never    Passive exposure: Never   Smokeless Tobacco Never     Family History:   Family History   Problem Relation Age of Onset    Hypertension Mother     Hypertension Father        Meds/Allergies   PTA meds:   Cannot display prior to admission medications because the patient has not been admitted in this contact.     No Known Allergies    Objective       Current Vitals:          Invasive Devices       None                   Physical Exam  Vitals reviewed.   Constitutional:       General: She is not in acute distress.     Appearance: Normal appearance. She is not ill-appearing.   HENT:      Head: Normocephalic and atraumatic.      Nose: Nose normal.      Mouth/Throat:      Mouth: Mucous membranes are moist.      Pharynx: Oropharynx is clear.   Eyes:      Extraocular Movements: Extraocular movements intact.      Conjunctiva/sclera: Conjunctivae normal.   Cardiovascular:      Rate and Rhythm: Normal rate.   Pulmonary:      Effort: Pulmonary effort is normal. No respiratory distress.   Abdominal:      General: There is no distension.      Palpations: Abdomen is soft.      Tenderness: There is no abdominal tenderness. There is no guarding or rebound.      Comments: Incisions appear clean/dry/intact without evidence of local infection, bleeding, or drainage     Musculoskeletal:         General: No deformity. Normal range of motion.      Cervical back: Normal range of motion and neck supple.   Skin:      General: Skin is warm and dry.      Coloration: Skin is not jaundiced.   Neurological:      General: No focal deficit present.      Mental Status: She is alert and oriented to person, place, and time.   Psychiatric:         Mood and Affect: Mood normal.         Thought Content: Thought content normal.             Assessment/PLAN:    39 y.o. female status post laparoscopic conversion from sleeve gastrectomy to RNYGB done on 1/14/2025 by Dr. Clark, doing well post op. No major issues and healing well.       Increase physical activity slowly as tolerated and instructed.  Advance diet as instructed by our dietitians today and as indicated in the binder.  Continue PPI    Follow up in one month as scheduled.          Дмитрий Wolfe MD  Bariatric Surgery   1/23/2025  9:30 AM      .

## 2025-02-06 DIAGNOSIS — E53.8 VITAMIN B12 DEFICIENCY: ICD-10-CM

## 2025-02-06 DIAGNOSIS — R79.0 LOW FERRITIN: ICD-10-CM

## 2025-02-06 DIAGNOSIS — E55.9 VITAMIN D DEFICIENCY: ICD-10-CM

## 2025-02-06 RX ORDER — FERROUS SULFATE 325(65) MG
TABLET ORAL
Qty: 30 TABLET | Refills: 0 | OUTPATIENT
Start: 2025-02-06

## 2025-02-24 DIAGNOSIS — E55.9 VITAMIN D DEFICIENCY: ICD-10-CM

## 2025-02-24 DIAGNOSIS — R79.0 LOW FERRITIN: ICD-10-CM

## 2025-02-24 DIAGNOSIS — E53.8 VITAMIN B12 DEFICIENCY: ICD-10-CM

## 2025-02-24 NOTE — PROGRESS NOTES
Weight Management Nutrition Class     Diagnosis: Morbid Obesity    Bariatric Surgeon: Dr. Neel Clark    Surgery: Gastric Bypass Laparoscopic    Class: 5 week post op     Topics discussed today include:     fluid goals post op, protein goals post op, constipation, chew food well, exercise, avoidance of alcohol, PPI use, diet progression, hypoglycemia, dumping syndrome, protein supplems, vitamin/mineral supplements, calcium supplements, and iron supplements    Patient was able to verbalize basic diet (protein, fluid, vitamin and mineral) recommendations and possible nutrition-related complications. Yes

## 2025-02-25 ENCOUNTER — CLINICAL SUPPORT (OUTPATIENT)
Dept: BARIATRICS | Facility: CLINIC | Age: 40
End: 2025-02-25

## 2025-02-25 DIAGNOSIS — R63.5 ABNORMAL WEIGHT GAIN: ICD-10-CM

## 2025-02-25 DIAGNOSIS — E66.812 OBESITY, CLASS II, BMI 35-39.9: Primary | ICD-10-CM

## 2025-02-25 DIAGNOSIS — Z98.84 STATUS POST LAPAROSCOPIC SLEEVE GASTRECTOMY: ICD-10-CM

## 2025-02-25 PROCEDURE — RECHECK: Performed by: DIETITIAN, REGISTERED

## 2025-02-25 RX ORDER — FERROUS SULFATE 325(65) MG
TABLET ORAL
Qty: 30 TABLET | Refills: 3 | Status: SHIPPED | OUTPATIENT
Start: 2025-02-25

## 2025-04-03 ENCOUNTER — TELEPHONE (OUTPATIENT)
Age: 40
End: 2025-04-03

## 2025-04-03 NOTE — TELEPHONE ENCOUNTER
Pt called regarding the return to work paperwork. Pt was told it was faxed this morning.   Pt. Transported to floor, report to [facility].      Kely Farrell RN  12/13/21 [id number]

## 2025-04-22 ENCOUNTER — OFFICE VISIT (OUTPATIENT)
Dept: BARIATRICS | Facility: CLINIC | Age: 40
End: 2025-04-22
Payer: COMMERCIAL

## 2025-04-22 VITALS
SYSTOLIC BLOOD PRESSURE: 100 MMHG | HEIGHT: 65 IN | TEMPERATURE: 97.5 F | BODY MASS INDEX: 32.99 KG/M2 | DIASTOLIC BLOOD PRESSURE: 70 MMHG | HEART RATE: 71 BPM | WEIGHT: 198 LBS

## 2025-04-22 DIAGNOSIS — E66.811 OBESITY, CLASS I, BMI 30-34.9: ICD-10-CM

## 2025-04-22 DIAGNOSIS — Z98.84 BARIATRIC SURGERY STATUS: ICD-10-CM

## 2025-04-22 DIAGNOSIS — K91.2 POSTSURGICAL MALABSORPTION: ICD-10-CM

## 2025-04-22 DIAGNOSIS — Z48.815 ENCOUNTER FOR SURGICAL AFTERCARE FOLLOWING SURGERY OF DIGESTIVE SYSTEM: Primary | ICD-10-CM

## 2025-04-22 PROCEDURE — 99214 OFFICE O/P EST MOD 30 MIN: CPT | Performed by: PHYSICIAN ASSISTANT

## 2025-04-22 NOTE — PROGRESS NOTES
Date of surgery:1/14/2025  Procedure: RNU   Performing surgeon: Dr. Clark    Initial Weight - 231.5lb  Current Weight -198 lb  Austyn Weight - now  Total Body Weight Loss (EWL)- 33.5%  EWL% - 41%  TWB % -14%

## 2025-04-22 NOTE — PROGRESS NOTES
Assessment/Plan:     Patient ID: Steph Ceron is a 40 y.o. female.     Bariatric Surgery Status     status post laparoscopic conversion from sleeve gastrectomy to RNYGB performed by Dr. Roper on 1/14/2025 returning to office for 2nd post op visit since surgery.   continue PPI daily     Tolerating diet.  Denies nausea and vomiting.  Taking multivitamins and PPI daily.      Continued/Maintain healthy weight loss with good nutrition intakes.  Adequate hydration with at least 64oz. fluid intake.  Follow diet as discussed.  Follow vitamin and mineral recommendations as reviewed with you.  Exercise as tolerated.    Colonoscopy referral made: n/a    Follow-up in 3 months. We kindly ask that your arrive 15 minutes before your scheduled appointment time with your provider to allow our staff to room you, get your vital signs and update your chart.    Get lab work done. Please call the office if you need a script.  It is recommended to check with your insurance BEFORE getting labs done to make sure they are covered by your policy.      Call our office if you have any problems with abdominal pain especially associated with fever, chills, nausea, vomiting or any other concerns.    All  Post-bariatric surgery patients should be aware that very small quantities of any alcohol can cause impairment and it is very possible not to feel the effect. The effect can be in the system for several hours.  It is also a stomach irritant.     It is advised to AVOID alcohol, Nonsteroidal antiinflammatory drugs (NSAIDS) and nicotine of all forms . Any of these can cause stomach irritation/pain.    Discussed the effects of alcohol on a bariatric patient and the increased impairment risk.     Keep up the good work!     Postsurgical Malabsorption   -At risk for malabsorption of vitamins/minerals secondary to malabsorption and restriction of intake from bariatric surgery  -Currently taking adequate postop bariatric surgery vitamin  supplementation  -Next set of bariatric labs ordered for approximately 2 weeks  -Patient received education about the importance of adhering to a lifelong supplementation regimen to avoid vitamin/mineral deficiencies      Diagnoses and all orders for this visit:    Encounter for surgical aftercare following surgery of digestive system  -     Zinc; Future  -     Vitamin D 25 hydroxy; Future  -     Vitamin B12; Future  -     Vitamin B1, whole blood; Future  -     Vitamin A; Future  -     PTH, intact; Future  -     CBC and Platelet; Future  -     Comprehensive metabolic panel; Future  -     Ferritin; Future  -     Folate; Future  -     TIBC Panel (incl. Iron, TIBC, % Iron Saturation); Future  -     Zinc  -     Vitamin D 25 hydroxy  -     Vitamin B12  -     Vitamin B1, whole blood  -     Vitamin A  -     PTH, intact  -     CBC and Platelet  -     Comprehensive metabolic panel  -     Ferritin  -     Folate  -     TIBC Panel (incl. Iron, TIBC, % Iron Saturation)    Bariatric surgery status  -     Zinc; Future  -     Vitamin D 25 hydroxy; Future  -     Vitamin B12; Future  -     Vitamin B1, whole blood; Future  -     Vitamin A; Future  -     PTH, intact; Future  -     CBC and Platelet; Future  -     Comprehensive metabolic panel; Future  -     Ferritin; Future  -     Folate; Future  -     TIBC Panel (incl. Iron, TIBC, % Iron Saturation); Future  -     Zinc  -     Vitamin D 25 hydroxy  -     Vitamin B12  -     Vitamin B1, whole blood  -     Vitamin A  -     PTH, intact  -     CBC and Platelet  -     Comprehensive metabolic panel  -     Ferritin  -     Folate  -     TIBC Panel (incl. Iron, TIBC, % Iron Saturation)    Postsurgical malabsorption  -     Zinc; Future  -     Vitamin D 25 hydroxy; Future  -     Vitamin B12; Future  -     Vitamin B1, whole blood; Future  -     Vitamin A; Future  -     PTH, intact; Future  -     CBC and Platelet; Future  -     Comprehensive metabolic panel; Future  -     Ferritin; Future  -      Folate; Future  -     TIBC Panel (incl. Iron, TIBC, % Iron Saturation); Future  -     Zinc  -     Vitamin D 25 hydroxy  -     Vitamin B12  -     Vitamin B1, whole blood  -     Vitamin A  -     PTH, intact  -     CBC and Platelet  -     Comprehensive metabolic panel  -     Ferritin  -     Folate  -     TIBC Panel (incl. Iron, TIBC, % Iron Saturation)    Obesity, Class I, BMI 30-34.9  -     Zinc; Future  -     Vitamin D 25 hydroxy; Future  -     Vitamin B12; Future  -     Vitamin B1, whole blood; Future  -     Vitamin A; Future  -     PTH, intact; Future  -     CBC and Platelet; Future  -     Comprehensive metabolic panel; Future  -     Ferritin; Future  -     Folate; Future  -     TIBC Panel (incl. Iron, TIBC, % Iron Saturation); Future  -     Zinc  -     Vitamin D 25 hydroxy  -     Vitamin B12  -     Vitamin B1, whole blood  -     Vitamin A  -     PTH, intact  -     CBC and Platelet  -     Comprehensive metabolic panel  -     Ferritin  -     Folate  -     TIBC Panel (incl. Iron, TIBC, % Iron Saturation)         Subjective:      Patient ID: Steph Ceron is a 40 y.o. female.   status post laparoscopic conversion from sleeve gastrectomy to RNYGB performed by Dr. Roper on 1/14/2025 returning to office for 2nd post op visit since surgery.   Tolerating diet without issues; denies N/V, dysphagia, reflux.     Initial: 231.5  Current: 198  EWL: (Weight loss is ahead of schedule at this post surgical period.)  Austyn: current   Current BMI is Body mass index is 32.95 kg/m².    Tolerating a regular diet-yes  Eating at least 60 grams of protein per day-yes  Drinking at least 64 ounces of fluid per day-yes  Sufficient exercise-working on improving   Using nicotine-no  Using alcohol-no  Supplements:  jesse MVI     EWL is 41%, which places the patient ahead of schedule for expected post surgical weight loss at this time.     The following portions of the patient's history were reviewed and updated as appropriate: allergies,  "current medications, past family history, past medical history, past social history, past surgical history and problem list.    Review of Systems   Constitutional: Negative.    Respiratory: Negative.     Cardiovascular: Negative.    Gastrointestinal: Negative.    Neurological: Negative.    Psychiatric/Behavioral: Negative.           Objective:    /70 (Patient Position: Sitting, Cuff Size: Standard)   Pulse 71   Temp 97.5 °F (36.4 °C) (Tympanic)   Ht 5' 5\" (1.651 m)   Wt 89.8 kg (198 lb)   BMI 32.95 kg/m²      Physical Exam  Vitals and nursing note reviewed.   Constitutional:       Appearance: Normal appearance. She is obese.   HENT:      Head: Normocephalic and atraumatic.   Eyes:      Extraocular Movements: Extraocular movements intact.   Cardiovascular:      Rate and Rhythm: Normal rate.   Pulmonary:      Effort: Pulmonary effort is normal.   Musculoskeletal:         General: Normal range of motion.      Cervical back: Normal range of motion.   Skin:     General: Skin is warm and dry.   Neurological:      General: No focal deficit present.      Mental Status: She is alert and oriented to person, place, and time.   Psychiatric:         Mood and Affect: Mood normal.             "

## 2025-05-22 ENCOUNTER — VBI (OUTPATIENT)
Dept: ADMINISTRATIVE | Facility: OTHER | Age: 40
End: 2025-05-22

## 2025-05-22 NOTE — TELEPHONE ENCOUNTER
05/22/25 12:45 PM     Chart reviewed for Pap Smear (HPV) aka Cervical Cancer Screening was/were submitted to the patient's insurance.     Vanna Miller MA   [facility]

## 2025-06-17 ENCOUNTER — ANNUAL EXAM (OUTPATIENT)
Dept: OBGYN CLINIC | Facility: CLINIC | Age: 40
End: 2025-06-17

## 2025-06-17 VITALS
WEIGHT: 186.8 LBS | DIASTOLIC BLOOD PRESSURE: 66 MMHG | BODY MASS INDEX: 31.12 KG/M2 | SYSTOLIC BLOOD PRESSURE: 90 MMHG | HEIGHT: 65 IN

## 2025-06-17 DIAGNOSIS — N92.0 MENORRHAGIA WITH REGULAR CYCLE: ICD-10-CM

## 2025-06-17 DIAGNOSIS — Z11.3 SCREENING FOR STDS (SEXUALLY TRANSMITTED DISEASES): Primary | ICD-10-CM

## 2025-06-17 DIAGNOSIS — Z01.419 ENCOUNTER FOR ANNUAL ROUTINE GYNECOLOGICAL EXAMINATION: ICD-10-CM

## 2025-06-17 PROCEDURE — S0612 ANNUAL GYNECOLOGICAL EXAMINA: HCPCS | Performed by: NURSE PRACTITIONER

## 2025-06-17 PROCEDURE — 87491 CHLMYD TRACH DNA AMP PROBE: CPT | Performed by: NURSE PRACTITIONER

## 2025-06-17 PROCEDURE — 87591 N.GONORRHOEAE DNA AMP PROB: CPT | Performed by: NURSE PRACTITIONER

## 2025-06-17 RX ORDER — NORETHINDRONE ACETATE AND ETHINYL ESTRADIOL 1MG-20(21)
1 KIT ORAL DAILY
Qty: 28 TABLET | Refills: 3 | Status: SHIPPED | OUTPATIENT
Start: 2025-06-17

## 2025-06-17 NOTE — PATIENT INSTRUCTIONS
STD results can take a few days  Remember safe sex and condom use  Start birth control pills today  Call with needs or concerns  Return in 3 months to follow up birth control pills for heavy painful periods  Annual GYN exam in 1 year

## 2025-06-17 NOTE — PROGRESS NOTES
Annual Exam    Assessment   1. Screening for STDs (sexually transmitted diseases)  Chlamydia/GC amplified DNA by PCR      2. Encounter for annual routine gynecological examination        3. Menorrhagia with regular cycle  norethindrone-ethinyl estradiol (Loestrin Fe ) 1-20 MG-MCG per tablet        well woman       Plan       All questions answered.  Breast self exam technique reviewed and patient encouraged to perform self-exam monthly.  Contraception: tubal ligation.  Discussed healthy lifestyle modifications.  Follow up in 1 year.     Patient Instructions   STD results can take a few days  Remember safe sex and condom use  Start birth control pills today  Call with needs or concerns  Return in 3 months to follow up birth control pills for heavy painful periods  Annual GYN exam in 1 year  Pt verbalized understanding of all discussed.      Subjective      Steph Ceron is a 40 y.o.  female who presents for annual well woman exam. Periods are regular every 28-30 days, lasting 4 days. Pt states she has menstrual cramps  No intermenstrual bleeding, spotting, or discharge.   2023 WNL Pap and negative HPV  1 partner x 17 years, denies domestic violence  HPV vaccines in     Current contraception: tubal ligation  History of abnormal Pap smear: yes -  HGSIL and colpo  Family history of uterine or ovarian cancer: no  Regular self breast exam: yes  History of abnormal mammogram: N/A  Family history of breast cancer: no  History of abnormal lipids: no  Menstrual History:  OB History          3    Para   3    Term   3            AB        Living   3         SAB        IAB        Ectopic        Multiple        Live Births   3                Menarche age: 13  Patient's last menstrual period was 2025 (approximate).       The following portions of the patient's history were reviewed and updated as appropriate: allergies, current medications, past family history, past medical history,  "past social history, past surgical history and problem list.    Review of Systems  Pertinent items are noted in HPI.      Objective      BP 90/66 (BP Location: Left arm, Patient Position: Sitting)   Ht 5' 5\" (1.651 m)   Wt 84.7 kg (186 lb 12.8 oz)   LMP 06/04/2025 (Approximate)   BMI 31.09 kg/m²     General: alert, appears stated age, and cooperative   Heart: regular rate and rhythm, S1, S2 normal, no murmur, click, rub or gallop   Lungs: clear to auscultation bilaterally   Thyroid: Negative masses palpable   Abdomen: soft, non-tender, without masses or organomegaly   Vulva: normal   Vagina: normal mucosa   Cervix: no cervical motion tenderness and no lesions   Uterus: normal size, non-tender, normal shape and consistency   Adnexa: normal adnexa   Urethra: normal   Breasts: NT,negative masses palpable, discharge, or dimpling             "

## 2025-06-18 LAB
C TRACH DNA SPEC QL NAA+PROBE: NEGATIVE
N GONORRHOEA DNA SPEC QL NAA+PROBE: NEGATIVE

## 2025-07-14 ENCOUNTER — TELEPHONE (OUTPATIENT)
Dept: BARIATRICS | Facility: CLINIC | Age: 40
End: 2025-07-14

## 2025-07-14 NOTE — TELEPHONE ENCOUNTER
LVM for pt in regards to confirming appt on 7/24/25 at 11:30 am with Deepthi. Patient still unconfirmed.

## 2025-07-25 ENCOUNTER — OFFICE VISIT (OUTPATIENT)
Dept: BARIATRICS | Facility: CLINIC | Age: 40
End: 2025-07-25
Payer: COMMERCIAL

## 2025-07-25 VITALS
HEIGHT: 65 IN | DIASTOLIC BLOOD PRESSURE: 74 MMHG | BODY MASS INDEX: 29.91 KG/M2 | WEIGHT: 179.5 LBS | SYSTOLIC BLOOD PRESSURE: 106 MMHG | TEMPERATURE: 97.8 F | HEART RATE: 76 BPM

## 2025-07-25 DIAGNOSIS — R79.0 LOW FERRITIN: ICD-10-CM

## 2025-07-25 DIAGNOSIS — E55.9 VITAMIN D DEFICIENCY: ICD-10-CM

## 2025-07-25 DIAGNOSIS — K91.2 POSTSURGICAL MALABSORPTION: ICD-10-CM

## 2025-07-25 DIAGNOSIS — E53.8 VITAMIN B12 DEFICIENCY: ICD-10-CM

## 2025-07-25 DIAGNOSIS — Z48.815 ENCOUNTER FOR SURGICAL AFTERCARE FOLLOWING SURGERY OF DIGESTIVE SYSTEM: Primary | ICD-10-CM

## 2025-07-25 DIAGNOSIS — Z98.84 BARIATRIC SURGERY STATUS: ICD-10-CM

## 2025-07-25 DIAGNOSIS — E66.3 OVERWEIGHT: ICD-10-CM

## 2025-07-25 PROCEDURE — 99214 OFFICE O/P EST MOD 30 MIN: CPT | Performed by: PHYSICIAN ASSISTANT

## 2025-07-25 RX ORDER — OMEPRAZOLE 20 MG/1
20 CAPSULE, DELAYED RELEASE ORAL DAILY
Qty: 90 CAPSULE | Refills: 1 | Status: SHIPPED | OUTPATIENT
Start: 2025-07-25

## 2025-07-25 NOTE — PROGRESS NOTES
Assessment/Plan:     Patient ID: Steph Ceron is a 40 y.o. female.     Bariatric Surgery Status    status post laparoscopic conversion from sleeve gastrectomy to RNYGB performed by Dr. Roper on 1/14/2025 returning to office for 3rd post op visit since surgery.     Tolerating diet. Denies nausea and vomiting. Taking multivitamins and PPI daily.     Continued/Maintain healthy weight loss with good nutrition intakes.  Adequate hydration with at least 64oz. fluid intake.  Follow diet as discussed.  Follow vitamin and mineral recommendations as reviewed with you.  Exercise as tolerated.    Colonoscopy referral made: n/a    Follow-up in 6 months. We kindly ask that your arrive 15 minutes before your scheduled appointment time with your provider to allow our staff to room you, get your vital signs and update your chart.    Get lab work done. Please call the office if you need a script.  It is recommended to check with your insurance BEFORE getting labs done to make sure they are covered by your policy.      Call our office if you have any problems with abdominal pain especially associated with fever, chills, nausea, vomiting or any other concerns.    All  Post-bariatric surgery patients should be aware that very small quantities of any alcohol can cause impairment and it is very possible not to feel the effect. The effect can be in the system for several hours.  It is also a stomach irritant.     It is advised to AVOID alcohol, Nonsteroidal antiinflammatory drugs (NSAIDS) and nicotine of all forms . Any of these can cause stomach irritation/pain.    Discussed the effects of alcohol on a bariatric patient and the increased impairment risk.     Keep up the good work!     Postsurgical Malabsorption   -At risk for malabsorption of vitamins/minerals secondary to malabsorption and restriction of intake from bariatric surgery  -Currently taking adequate postop bariatric surgery vitamin supplementation  -Last set of  "bariatric labs ordered at last visit but needs to complete  -Patient received education about the importance of adhering to a lifelong supplementation regimen to avoid vitamin/mineral deficiencies      Diagnoses and all orders for this visit:    Encounter for surgical aftercare following surgery of digestive system    Bariatric surgery status  -     omeprazole (PriLOSEC) 20 mg delayed release capsule; Take 1 capsule (20 mg total) by mouth daily    Postsurgical malabsorption    Overweight         Subjective:      Patient ID: Steph Ceron is a 40 y.o. female.    status post laparoscopic conversion from sleeve gastrectomy to RNYGB performed by Dr. Roper on 1/14/2025 returning to office for 3rd post op visit since surgery. . Tolerating diet without issues; denies N/V, dysphagia, reflux. Overall doing     Initial: 231.5  Current: 198  EWL: (Weight loss is ahead of schedule at this post surgical period.)  Austyn: current   Current BMI is Body mass index is 29.87 kg/m².    Tolerating a regular diet-yes  Eating at least 60 grams of protein per day-ye  Drinking at least 64 ounces of fluid per day-yes  Sufficient exercise-light   Using nicotine-no  Using alcohol-no  Supplements: Multivitamins + iron + vit d + calcium        The following portions of the patient's history were reviewed and updated as appropriate: allergies, current medications, past family history, past medical history, past social history, past surgical history and problem list.    Review of Systems   Constitutional: Negative.    Respiratory: Negative.     Cardiovascular: Negative.    Gastrointestinal: Negative.    Neurological: Negative.    Psychiatric/Behavioral: Negative.           Objective:    /74 (Patient Position: Sitting, Cuff Size: Standard)   Pulse 76   Temp 97.8 °F (36.6 °C) (Tympanic)   Ht 5' 5\" (1.651 m)   Wt 81.4 kg (179 lb 8 oz)   BMI 29.87 kg/m²      Physical Exam  Vitals and nursing note reviewed.   Constitutional:       " Appearance: Normal appearance.   HENT:      Head: Normocephalic and atraumatic.     Eyes:      Extraocular Movements: Extraocular movements intact.       Cardiovascular:      Rate and Rhythm: Normal rate.   Pulmonary:      Effort: Pulmonary effort is normal.   Abdominal:      General: Bowel sounds are normal.     Musculoskeletal:         General: Normal range of motion.      Cervical back: Normal range of motion.     Skin:     General: Skin is warm and dry.     Neurological:      General: No focal deficit present.      Mental Status: She is alert and oriented to person, place, and time.     Psychiatric:         Mood and Affect: Mood normal.

## 2025-07-25 NOTE — PROGRESS NOTES
Date of surgery:1/14/2025  Procedure:RNY  Performing surgeon: Dr. Clark    Initial Weight - 231 lb  Current Weight -179.5lb  Austyn Weight - now  Total Body Weight Loss (EWL)- 52%  EWL% - 64%  TWB % -22%    For *NEW/TRANSFER* patients only: Who referred the patient? Please provide name and specialty (PCP, GI, etc.).

## 2025-07-28 RX ORDER — LANOLIN ALCOHOL/MO/W.PET/CERES
1000 CREAM (GRAM) TOPICAL DAILY
Qty: 90 TABLET | Refills: 0 | Status: SHIPPED | OUTPATIENT
Start: 2025-07-28 | End: 2025-08-06 | Stop reason: SDUPTHER

## 2025-07-28 RX ORDER — FERROUS SULFATE 325(65) MG
TABLET ORAL
Qty: 30 TABLET | Refills: 0 | OUTPATIENT
Start: 2025-07-28

## 2025-08-06 ENCOUNTER — OFFICE VISIT (OUTPATIENT)
Dept: FAMILY MEDICINE CLINIC | Facility: CLINIC | Age: 40
End: 2025-08-06
Payer: COMMERCIAL

## 2025-08-06 VITALS
BODY MASS INDEX: 29.89 KG/M2 | HEART RATE: 87 BPM | TEMPERATURE: 98.6 F | HEIGHT: 65 IN | DIASTOLIC BLOOD PRESSURE: 74 MMHG | OXYGEN SATURATION: 98 % | WEIGHT: 179.4 LBS | SYSTOLIC BLOOD PRESSURE: 118 MMHG

## 2025-08-06 DIAGNOSIS — Z11.59 NEED FOR HEPATITIS C SCREENING TEST: ICD-10-CM

## 2025-08-06 DIAGNOSIS — E55.9 VITAMIN D DEFICIENCY: ICD-10-CM

## 2025-08-06 DIAGNOSIS — Z90.49 S/P APPENDECTOMY: ICD-10-CM

## 2025-08-06 DIAGNOSIS — E53.8 VITAMIN B12 DEFICIENCY: ICD-10-CM

## 2025-08-06 DIAGNOSIS — Z98.890 HISTORY OF LUMBAR LAMINECTOMY: ICD-10-CM

## 2025-08-06 DIAGNOSIS — M54.16 LUMBAR RADICULOPATHY: Primary | ICD-10-CM

## 2025-08-06 DIAGNOSIS — Z11.4 SCREENING FOR HIV (HUMAN IMMUNODEFICIENCY VIRUS): ICD-10-CM

## 2025-08-06 DIAGNOSIS — R79.0 LOW FERRITIN: ICD-10-CM

## 2025-08-06 DIAGNOSIS — Z98.84 BARIATRIC SURGERY STATUS: ICD-10-CM

## 2025-08-06 LAB — SL AMB POCT HEMOGLOBIN AIC: 5.2 (ref ?–6.5)

## 2025-08-06 PROCEDURE — 96372 THER/PROPH/DIAG INJ SC/IM: CPT | Performed by: INTERNAL MEDICINE

## 2025-08-06 PROCEDURE — 83036 HEMOGLOBIN GLYCOSYLATED A1C: CPT | Performed by: INTERNAL MEDICINE

## 2025-08-06 PROCEDURE — 99214 OFFICE O/P EST MOD 30 MIN: CPT | Performed by: INTERNAL MEDICINE

## 2025-08-06 RX ORDER — CYANOCOBALAMIN 1000 UG/ML
1000 INJECTION, SOLUTION INTRAMUSCULAR; SUBCUTANEOUS ONCE
Status: COMPLETED | OUTPATIENT
Start: 2025-08-06 | End: 2025-08-06

## 2025-08-06 RX ORDER — LANOLIN ALCOHOL/MO/W.PET/CERES
1000 CREAM (GRAM) TOPICAL DAILY
Qty: 90 TABLET | Refills: 0 | Status: SHIPPED | OUTPATIENT
Start: 2025-08-06

## 2025-08-06 RX ADMIN — CYANOCOBALAMIN 1000 MCG: 1000 INJECTION, SOLUTION INTRAMUSCULAR; SUBCUTANEOUS at 14:33

## (undated) DEVICE — PLUMEPEN PRO 10FT

## (undated) DEVICE — MAYO STAND COVER: Brand: CONVERTORS

## (undated) DEVICE — TUBING SMOKE EVAC W/FILTRATION DEVICE PLUMEPORT ACTIV

## (undated) DEVICE — CLAMP: Brand: ENDO BABCOCK

## (undated) DEVICE — LAPAROSCOPIC TROCAR SLEEVE/SINGLE USE: Brand: KII® SLEEVE

## (undated) DEVICE — VIOLET BRAIDED (POLYGLACTIN 910), SYNTHETIC ABSORBABLE SUTURE: Brand: COATED VICRYL

## (undated) DEVICE — TROCAR: Brand: KII FIOS FIRST ENTRY

## (undated) DEVICE — SUTURING DEVICE: Brand: ENDO STITCH

## (undated) DEVICE — TROCARS: Brand: KII® BALLOON BLUNT TIP SYSTEM

## (undated) DEVICE — CHLORAPREP HI-LITE 26ML ORANGE

## (undated) DEVICE — STERILE 8 INCH PROCTO SWAB: Brand: CARDINAL HEALTH

## (undated) DEVICE — DRAPE EQUIPMENT RF WAND

## (undated) DEVICE — GLOVE PI ULTRA TOUCH SZ.7.5

## (undated) DEVICE — LEEP LOOP ELECTRODE SMALL 10 X 10

## (undated) DEVICE — NEEDLE SPINAL18G X 3.5 IN QUINCKE

## (undated) DEVICE — PREMIUM DRY TRAY LF: Brand: MEDLINE INDUSTRIES, INC.

## (undated) DEVICE — VISUALIZATION SYSTEM: Brand: CLEARIFY

## (undated) DEVICE — ARTICULATING RELOAD WITH TRI-STAPLE TECHNOLOGY: Brand: ENDO GIA

## (undated) DEVICE — ENDO STITCH 2-0 SURGIDAC 48 IN

## (undated) DEVICE — IRRIG ENDO FLO TUBING

## (undated) DEVICE — SYRINGE 20ML LL

## (undated) DEVICE — PMI DISPOSABLE PUNCTURE CLOSURE DEVICE / SUTURE GRASPER: Brand: PMI

## (undated) DEVICE — GLOVE SRG BIOGEL 8

## (undated) DEVICE — SUT MONOCRYL 4-0 PS-2 27 IN Y426H

## (undated) DEVICE — CURVED JAW CORDLESS ULTRASONIC DISSECTOR: Brand: SONICISION 7

## (undated) DEVICE — LEEP LOOP ELECTRODE WIDE 20 X 15

## (undated) DEVICE — INTENDED FOR TISSUE SEPARATION, AND OTHER PROCEDURES THAT REQUIRE A SHARP SURGICAL BLADE TO PUNCTURE OR CUT.: Brand: BARD-PARKER SAFETY BLADES SIZE 11, STERILE

## (undated) DEVICE — INTENDED FOR TISSUE SEPARATION, AND OTHER PROCEDURES THAT REQUIRE A SHARP SURGICAL BLADE TO PUNCTURE OR CUT.: Brand: BARD-PARKER SAFETY BLADES SIZE 15, STERILE

## (undated) DEVICE — ENSEAL LAPAROSCOPIC TISSUE SEALER G2 CURVED JAW FOR USE WITH G2 GENERATOR 5MM DIAMETER 35CM SHAFT LENGTH: Brand: ENSEAL

## (undated) DEVICE — URETERAL CATHETER ADAPTOR TIP

## (undated) DEVICE — PACK PBDS MINOR GYN LAP RF

## (undated) DEVICE — METZENBAUM ADTEC SINGLE USE DISSECTING SCISSORS, SHAFT ONLY, MONOPOLAR, CURVED TO LEFT, WORKING LENGTH: 12 1/4", (310 MM), DIAM. 5 MM, INSULATED, DOUBLE ACTION, STERILE, DISPOSABLE, PACKAGE OF 10 PIECES: Brand: AESCULAP

## (undated) DEVICE — CIRCULAR STAPLER WITH DST SERIES TECHNOLOGY: Brand: EEA

## (undated) DEVICE — SYRINGE 30ML LL

## (undated) DEVICE — TROCAR VISIPORT

## (undated) DEVICE — ALLENTOWN LAP CHOLE APP PACK: Brand: CARDINAL HEALTH

## (undated) DEVICE — TELFA NON-ADHERENT ABSORBENT DRESSING: Brand: TELFA

## (undated) DEVICE — 2000CC GUARDIAN II: Brand: GUARDIAN

## (undated) DEVICE — HARMONIC 1100 SHEARS, 36CM SHAFT LENGTH: Brand: HARMONIC

## (undated) DEVICE — Device

## (undated) DEVICE — TRAY FOLEY 16FR URIMETER SILICONE SURESTEP

## (undated) DEVICE — POWER SHELL: Brand: SIGNIA

## (undated) DEVICE — 3000CC GUARDIAN II: Brand: GUARDIAN

## (undated) DEVICE — [HIGH FLOW INSUFFLATOR,  DO NOT USE IF PACKAGE IS DAMAGED,  KEEP DRY,  KEEP AWAY FROM SUNLIGHT,  PROTECT FROM HEAT AND RADIOACTIVE SOURCES.]: Brand: PNEUMOSURE

## (undated) DEVICE — ENDO STITCH 2-0 VICRYL

## (undated) DEVICE — SCD SEQUENTIAL COMPRESSION COMFORT SLEEVE MEDIUM KNEE LENGTH: Brand: KENDALL SCD

## (undated) DEVICE — ADHESIVE SKIN HIGH VISCOSITY EXOFIN 1ML

## (undated) DEVICE — ELECTRODE LAP SPATULA STR E-Z CLEAN 33CM -0018

## (undated) DEVICE — TROCAR: Brand: KII® SLEEVE

## (undated) DEVICE — ARTICULATION RELOAD WITH TRI-STAPLE TECHNOLOGY: Brand: ENDO GIA

## (undated) DEVICE — PVC URETHRAL CATHETER: Brand: DOVER

## (undated) DEVICE — TRAY FOLEY 16FR URIMETER SURESTEP

## (undated) DEVICE — INSUFLATION TUBING INSUFLOW (LEXION)

## (undated) DEVICE — WEBRIL 6 IN UNSTERILE

## (undated) DEVICE — EXOFIN PRECISION PEN HIGH VISCOSITY TOPICAL SKIN ADHESIVE: Brand: EXOFIN PRECISION PEN, 1G

## (undated) DEVICE — TUBING SUCTION 5MM X 12 FT

## (undated) DEVICE — TIBURON LAPAROSCOPIC ABDOMINAL DRAPE: Brand: CONVERTORS

## (undated) DEVICE — MEDI-VAC TUBING CONNECTOR 6-IN-1 STRAIGHT: Brand: CARDINAL HEALTH